# Patient Record
Sex: MALE | Race: WHITE | URBAN - METROPOLITAN AREA
[De-identification: names, ages, dates, MRNs, and addresses within clinical notes are randomized per-mention and may not be internally consistent; named-entity substitution may affect disease eponyms.]

---

## 2021-07-25 ENCOUNTER — HOSPITAL ENCOUNTER (INPATIENT)
Age: 51
LOS: 7 days | Discharge: HOME HEALTH | DRG: 720 | End: 2021-08-02
Attending: EMERGENCY MEDICINE | Admitting: INTERNAL MEDICINE
Payer: COMMERCIAL

## 2021-07-25 ENCOUNTER — APPOINTMENT (OUTPATIENT)
Dept: CT IMAGING | Age: 51
DRG: 720 | End: 2021-07-25
Payer: COMMERCIAL

## 2021-07-25 ENCOUNTER — APPOINTMENT (OUTPATIENT)
Dept: GENERAL RADIOLOGY | Age: 51
DRG: 720 | End: 2021-07-25
Payer: COMMERCIAL

## 2021-07-25 DIAGNOSIS — I87.8 CHRONIC VENOUS STASIS: ICD-10-CM

## 2021-07-25 DIAGNOSIS — B19.20 HEPATITIS C VIRUS INFECTION WITHOUT HEPATIC COMA, UNSPECIFIED CHRONICITY: ICD-10-CM

## 2021-07-25 DIAGNOSIS — J90 PLEURAL EFFUSION: Primary | ICD-10-CM

## 2021-07-25 DIAGNOSIS — K74.60 CIRRHOSIS OF LIVER WITH ASCITES, UNSPECIFIED HEPATIC CIRRHOSIS TYPE (HCC): ICD-10-CM

## 2021-07-25 DIAGNOSIS — R09.02 HYPOXIA: ICD-10-CM

## 2021-07-25 DIAGNOSIS — R18.8 CIRRHOSIS OF LIVER WITH ASCITES, UNSPECIFIED HEPATIC CIRRHOSIS TYPE (HCC): ICD-10-CM

## 2021-07-25 LAB
ALBUMIN SERPL-MCNC: 2.2 G/DL (ref 3.4–5)
ALP BLD-CCNC: 121 U/L (ref 40–129)
ALT SERPL-CCNC: 29 U/L (ref 10–40)
AMMONIA: 69 UMOL/L (ref 16–60)
ANION GAP SERPL CALCULATED.3IONS-SCNC: 12 MMOL/L (ref 3–16)
ANION GAP SERPL CALCULATED.3IONS-SCNC: 14 MMOL/L (ref 3–16)
ANISOCYTOSIS: ABNORMAL
AST SERPL-CCNC: 63 U/L (ref 15–37)
BANDED NEUTROPHILS RELATIVE PERCENT: 9 % (ref 0–7)
BASE EXCESS VENOUS: 3.1 MMOL/L (ref -2–3)
BASOPHILS ABSOLUTE: 0 K/UL (ref 0–0.2)
BASOPHILS RELATIVE PERCENT: 0 %
BILIRUB SERPL-MCNC: 5.1 MG/DL (ref 0–1)
BILIRUBIN DIRECT: 1.8 MG/DL (ref 0–0.3)
BILIRUBIN, INDIRECT: 3.3 MG/DL (ref 0–1)
BUN BLDV-MCNC: 23 MG/DL (ref 7–20)
BUN BLDV-MCNC: 24 MG/DL (ref 7–20)
CALCIUM SERPL-MCNC: 7.5 MG/DL (ref 8.3–10.6)
CALCIUM SERPL-MCNC: 7.8 MG/DL (ref 8.3–10.6)
CARBOXYHEMOGLOBIN: 2.5 % (ref 0–1.5)
CHLORIDE BLD-SCNC: 98 MMOL/L (ref 99–110)
CHLORIDE BLD-SCNC: 98 MMOL/L (ref 99–110)
CO2: 22 MMOL/L (ref 21–32)
CO2: 22 MMOL/L (ref 21–32)
CREAT SERPL-MCNC: 0.7 MG/DL (ref 0.9–1.3)
CREAT SERPL-MCNC: 0.9 MG/DL (ref 0.9–1.3)
EOSINOPHILS ABSOLUTE: 0 K/UL (ref 0–0.6)
EOSINOPHILS RELATIVE PERCENT: 0 %
GFR AFRICAN AMERICAN: >60
GFR AFRICAN AMERICAN: >60
GFR NON-AFRICAN AMERICAN: >60
GFR NON-AFRICAN AMERICAN: >60
GLUCOSE BLD-MCNC: 131 MG/DL (ref 70–99)
GLUCOSE BLD-MCNC: 50 MG/DL (ref 70–99)
HCO3 VENOUS: 27 MMOL/L (ref 24–28)
HCT VFR BLD CALC: 40.5 % (ref 40.5–52.5)
HEMOGLOBIN, VEN, REDUCED: 11.6 %
HEMOGLOBIN: 13.4 G/DL (ref 13.5–17.5)
LACTIC ACID, SEPSIS: 5.4 MMOL/L (ref 0.4–1.9)
LIPASE: 33 U/L (ref 13–60)
LYMPHOCYTES ABSOLUTE: 0 K/UL (ref 1–5.1)
LYMPHOCYTES RELATIVE PERCENT: 0 %
MCH RBC QN AUTO: 31.1 PG (ref 26–34)
MCHC RBC AUTO-ENTMCNC: 33.2 G/DL (ref 31–36)
MCV RBC AUTO: 93.6 FL (ref 80–100)
METHEMOGLOBIN VENOUS: 0 % (ref 0–1.5)
MONOCYTES ABSOLUTE: 1.4 K/UL (ref 0–1.3)
MONOCYTES RELATIVE PERCENT: 4 %
NEUTROPHILS ABSOLUTE: 34.5 K/UL (ref 1.7–7.7)
NEUTROPHILS RELATIVE PERCENT: 87 %
O2 SAT, VEN: 88 %
PCO2, VEN: 38.1 MMHG (ref 41–51)
PDW BLD-RTO: 18.5 % (ref 12.4–15.4)
PH VENOUS: 7.46 (ref 7.35–7.45)
PLATELET # BLD: 116 K/UL (ref 135–450)
PMV BLD AUTO: 8.6 FL (ref 5–10.5)
PO2, VEN: 53 MMHG (ref 25–40)
POLYCHROMASIA: ABNORMAL
POTASSIUM REFLEX MAGNESIUM: 5.3 MMOL/L (ref 3.5–5.1)
POTASSIUM REFLEX MAGNESIUM: 7 MMOL/L (ref 3.5–5.1)
PRO-BNP: 643 PG/ML (ref 0–124)
RBC # BLD: 4.33 M/UL (ref 4.2–5.9)
SODIUM BLD-SCNC: 132 MMOL/L (ref 136–145)
SODIUM BLD-SCNC: 134 MMOL/L (ref 136–145)
TCO2 CALC VENOUS: 28 MMOL/L
TOTAL PROTEIN: 7.1 G/DL (ref 6.4–8.2)
TROPONIN: 0.02 NG/ML
WBC # BLD: 35.9 K/UL (ref 4–11)

## 2021-07-25 PROCEDURE — 84484 ASSAY OF TROPONIN QUANT: CPT

## 2021-07-25 PROCEDURE — 82140 ASSAY OF AMMONIA: CPT

## 2021-07-25 PROCEDURE — 74177 CT ABD & PELVIS W/CONTRAST: CPT

## 2021-07-25 PROCEDURE — 83605 ASSAY OF LACTIC ACID: CPT

## 2021-07-25 PROCEDURE — 81001 URINALYSIS AUTO W/SCOPE: CPT

## 2021-07-25 PROCEDURE — 85025 COMPLETE CBC W/AUTO DIFF WBC: CPT

## 2021-07-25 PROCEDURE — 87186 SC STD MICRODIL/AGAR DIL: CPT

## 2021-07-25 PROCEDURE — 6360000004 HC RX CONTRAST MEDICATION: Performed by: EMERGENCY MEDICINE

## 2021-07-25 PROCEDURE — 87040 BLOOD CULTURE FOR BACTERIA: CPT

## 2021-07-25 PROCEDURE — 71260 CT THORAX DX C+: CPT

## 2021-07-25 PROCEDURE — 2580000003 HC RX 258: Performed by: EMERGENCY MEDICINE

## 2021-07-25 PROCEDURE — 80048 BASIC METABOLIC PNL TOTAL CA: CPT

## 2021-07-25 PROCEDURE — 96361 HYDRATE IV INFUSION ADD-ON: CPT

## 2021-07-25 PROCEDURE — 83690 ASSAY OF LIPASE: CPT

## 2021-07-25 PROCEDURE — 6360000002 HC RX W HCPCS: Performed by: EMERGENCY MEDICINE

## 2021-07-25 PROCEDURE — 83880 ASSAY OF NATRIURETIC PEPTIDE: CPT

## 2021-07-25 PROCEDURE — 70450 CT HEAD/BRAIN W/O DYE: CPT

## 2021-07-25 PROCEDURE — 87150 DNA/RNA AMPLIFIED PROBE: CPT

## 2021-07-25 PROCEDURE — 82803 BLOOD GASES ANY COMBINATION: CPT

## 2021-07-25 PROCEDURE — 36415 COLL VENOUS BLD VENIPUNCTURE: CPT

## 2021-07-25 PROCEDURE — 80076 HEPATIC FUNCTION PANEL: CPT

## 2021-07-25 PROCEDURE — 96375 TX/PRO/DX INJ NEW DRUG ADDON: CPT

## 2021-07-25 PROCEDURE — 71046 X-RAY EXAM CHEST 2 VIEWS: CPT

## 2021-07-25 PROCEDURE — 93005 ELECTROCARDIOGRAM TRACING: CPT | Performed by: EMERGENCY MEDICINE

## 2021-07-25 PROCEDURE — 2500000003 HC RX 250 WO HCPCS

## 2021-07-25 RX ORDER — DEXTROSE MONOHYDRATE 25 G/50ML
INJECTION, SOLUTION INTRAVENOUS
Status: COMPLETED
Start: 2021-07-25 | End: 2021-07-25

## 2021-07-25 RX ORDER — SODIUM CHLORIDE, SODIUM LACTATE, POTASSIUM CHLORIDE, AND CALCIUM CHLORIDE .6; .31; .03; .02 G/100ML; G/100ML; G/100ML; G/100ML
30 INJECTION, SOLUTION INTRAVENOUS ONCE
Status: COMPLETED | OUTPATIENT
Start: 2021-07-25 | End: 2021-07-26

## 2021-07-25 RX ORDER — ONDANSETRON 2 MG/ML
4 INJECTION INTRAMUSCULAR; INTRAVENOUS ONCE
Status: COMPLETED | OUTPATIENT
Start: 2021-07-25 | End: 2021-07-25

## 2021-07-25 RX ADMIN — SODIUM CHLORIDE, SODIUM LACTATE, POTASSIUM CHLORIDE, AND CALCIUM CHLORIDE 3060 ML: .6; .31; .03; .02 INJECTION, SOLUTION INTRAVENOUS at 22:19

## 2021-07-25 RX ADMIN — IOPAMIDOL 80 ML: 755 INJECTION, SOLUTION INTRAVENOUS at 22:31

## 2021-07-25 RX ADMIN — ONDANSETRON 4 MG: 2 INJECTION INTRAMUSCULAR; INTRAVENOUS at 23:32

## 2021-07-25 RX ADMIN — DEXTROSE MONOHYDRATE 25 G: 25 INJECTION, SOLUTION INTRAVENOUS at 22:18

## 2021-07-25 ASSESSMENT — ENCOUNTER SYMPTOMS
VOMITING: 0
COUGH: 1
SHORTNESS OF BREATH: 1
NAUSEA: 0
ABDOMINAL PAIN: 1
DIARRHEA: 0

## 2021-07-25 ASSESSMENT — PAIN DESCRIPTION - LOCATION: LOCATION: SCROTUM

## 2021-07-25 ASSESSMENT — PAIN SCALES - GENERAL: PAINLEVEL_OUTOF10: 10

## 2021-07-26 PROBLEM — K74.60 CIRRHOSIS (HCC): Status: ACTIVE | Noted: 2021-07-26

## 2021-07-26 LAB
A/G RATIO: 0.5 (ref 1.1–2.2)
ALBUMIN SERPL-MCNC: 2.1 G/DL (ref 3.4–5)
ALP BLD-CCNC: 97 U/L (ref 40–129)
ALT SERPL-CCNC: 25 U/L (ref 10–40)
AMORPHOUS: ABNORMAL /HPF
ANION GAP SERPL CALCULATED.3IONS-SCNC: 9 MMOL/L (ref 3–16)
APTT: 41.3 SEC (ref 26.2–38.6)
AST SERPL-CCNC: 51 U/L (ref 15–37)
BACTERIA: ABNORMAL /HPF
BASOPHILS ABSOLUTE: 0 K/UL (ref 0–0.2)
BASOPHILS RELATIVE PERCENT: 0 %
BILIRUB SERPL-MCNC: 4.3 MG/DL (ref 0–1)
BILIRUBIN URINE: ABNORMAL
BLOOD, URINE: ABNORMAL
BUN BLDV-MCNC: 23 MG/DL (ref 7–20)
CALCIUM SERPL-MCNC: 7.8 MG/DL (ref 8.3–10.6)
CHLORIDE BLD-SCNC: 101 MMOL/L (ref 99–110)
CLARITY: CLEAR
CO2: 26 MMOL/L (ref 21–32)
COLOR: YELLOW
CREAT SERPL-MCNC: 0.8 MG/DL (ref 0.9–1.3)
EKG ATRIAL RATE: 105 BPM
EKG DIAGNOSIS: NORMAL
EKG P AXIS: 50 DEGREES
EKG P-R INTERVAL: 138 MS
EKG Q-T INTERVAL: 328 MS
EKG QRS DURATION: 78 MS
EKG QTC CALCULATION (BAZETT): 433 MS
EKG R AXIS: 49 DEGREES
EKG T AXIS: 51 DEGREES
EKG VENTRICULAR RATE: 105 BPM
EOSINOPHILS ABSOLUTE: 0 K/UL (ref 0–0.6)
EOSINOPHILS RELATIVE PERCENT: 0.1 %
EPITHELIAL CELLS, UA: ABNORMAL /HPF (ref 0–5)
GFR AFRICAN AMERICAN: >60
GFR NON-AFRICAN AMERICAN: >60
GLOBULIN: 4 G/DL
GLUCOSE BLD-MCNC: 100 MG/DL (ref 70–99)
GLUCOSE BLD-MCNC: 88 MG/DL (ref 70–99)
GLUCOSE URINE: NEGATIVE MG/DL
HCT VFR BLD CALC: 36.1 % (ref 40.5–52.5)
HEMOGLOBIN: 12 G/DL (ref 13.5–17.5)
HIV AG/AB: NORMAL
HIV ANTIGEN: NORMAL
HIV-1 ANTIBODY: NORMAL
HIV-2 AB: NORMAL
INR BLD: 1.85 (ref 0.88–1.12)
KETONES, URINE: 15 MG/DL
LACTATE DEHYDROGENASE: 268 U/L (ref 100–190)
LACTIC ACID, SEPSIS: 6 MMOL/L (ref 0.4–1.9)
LACTIC ACID: 2.5 MMOL/L (ref 0.4–2)
LACTIC ACID: 2.8 MMOL/L (ref 0.4–2)
LEUKOCYTE ESTERASE, URINE: ABNORMAL
LYMPHOCYTES ABSOLUTE: 1.8 K/UL (ref 1–5.1)
LYMPHOCYTES RELATIVE PERCENT: 6.9 %
MCH RBC QN AUTO: 31 PG (ref 26–34)
MCHC RBC AUTO-ENTMCNC: 33.3 G/DL (ref 31–36)
MCV RBC AUTO: 93.1 FL (ref 80–100)
MICROSCOPIC EXAMINATION: YES
MONOCYTES ABSOLUTE: 0.9 K/UL (ref 0–1.3)
MONOCYTES RELATIVE PERCENT: 3.4 %
NEUTROPHILS ABSOLUTE: 23.6 K/UL (ref 1.7–7.7)
NEUTROPHILS RELATIVE PERCENT: 89.6 %
NITRITE, URINE: POSITIVE
PDW BLD-RTO: 18.3 % (ref 12.4–15.4)
PERFORMED ON: NORMAL
PH UA: 5.5 (ref 5–8)
PLATELET # BLD: 85 K/UL (ref 135–450)
PLATELET SLIDE REVIEW: ABNORMAL
PMV BLD AUTO: 8.4 FL (ref 5–10.5)
POTASSIUM REFLEX MAGNESIUM: 5.2 MMOL/L (ref 3.5–5.1)
POTASSIUM SERPL-SCNC: 5.5 MMOL/L (ref 3.5–5.1)
PROTEIN PROTEIN: 11.8 MG/DL
PROTEIN UA: 100 MG/DL
PROTHROMBIN TIME: 21.5 SEC (ref 9.9–12.7)
RBC # BLD: 3.88 M/UL (ref 4.2–5.9)
RBC UA: ABNORMAL /HPF (ref 0–4)
REPORT: NORMAL
SODIUM BLD-SCNC: 136 MMOL/L (ref 136–145)
SPECIFIC GRAVITY UA: >=1.03 (ref 1–1.03)
TOTAL PROTEIN: 6.1 G/DL (ref 6.4–8.2)
URINE REFLEX TO CULTURE: ABNORMAL
URINE TYPE: ABNORMAL
UROBILINOGEN, URINE: 2 E.U./DL
WBC # BLD: 26.4 K/UL (ref 4–11)
WBC UA: ABNORMAL /HPF (ref 0–5)

## 2021-07-26 PROCEDURE — P9047 ALBUMIN (HUMAN), 25%, 50ML: HCPCS | Performed by: INTERNAL MEDICINE

## 2021-07-26 PROCEDURE — 85025 COMPLETE CBC W/AUTO DIFF WBC: CPT

## 2021-07-26 PROCEDURE — 36415 COLL VENOUS BLD VENIPUNCTURE: CPT

## 2021-07-26 PROCEDURE — 87340 HEPATITIS B SURFACE AG IA: CPT

## 2021-07-26 PROCEDURE — 83615 LACTATE (LD) (LDH) ENZYME: CPT

## 2021-07-26 PROCEDURE — 6360000002 HC RX W HCPCS: Performed by: STUDENT IN AN ORGANIZED HEALTH CARE EDUCATION/TRAINING PROGRAM

## 2021-07-26 PROCEDURE — 6360000002 HC RX W HCPCS: Performed by: INTERNAL MEDICINE

## 2021-07-26 PROCEDURE — 86702 HIV-2 ANTIBODY: CPT

## 2021-07-26 PROCEDURE — 84156 ASSAY OF PROTEIN URINE: CPT

## 2021-07-26 PROCEDURE — 2580000003 HC RX 258: Performed by: STUDENT IN AN ORGANIZED HEALTH CARE EDUCATION/TRAINING PROGRAM

## 2021-07-26 PROCEDURE — 99284 EMERGENCY DEPT VISIT MOD MDM: CPT

## 2021-07-26 PROCEDURE — 6370000000 HC RX 637 (ALT 250 FOR IP): Performed by: STUDENT IN AN ORGANIZED HEALTH CARE EDUCATION/TRAINING PROGRAM

## 2021-07-26 PROCEDURE — 86704 HEP B CORE ANTIBODY TOTAL: CPT

## 2021-07-26 PROCEDURE — 87390 HIV-1 AG IA: CPT

## 2021-07-26 PROCEDURE — 86803 HEPATITIS C AB TEST: CPT

## 2021-07-26 PROCEDURE — 86701 HIV-1ANTIBODY: CPT

## 2021-07-26 PROCEDURE — 1200000000 HC SEMI PRIVATE

## 2021-07-26 PROCEDURE — 82570 ASSAY OF URINE CREATININE: CPT

## 2021-07-26 PROCEDURE — 6360000002 HC RX W HCPCS: Performed by: EMERGENCY MEDICINE

## 2021-07-26 PROCEDURE — 87522 HEPATITIS C REVRS TRNSCRPJ: CPT

## 2021-07-26 PROCEDURE — 94761 N-INVAS EAR/PLS OXIMETRY MLT: CPT

## 2021-07-26 PROCEDURE — 85610 PROTHROMBIN TIME: CPT

## 2021-07-26 PROCEDURE — 96365 THER/PROPH/DIAG IV INF INIT: CPT

## 2021-07-26 PROCEDURE — 85730 THROMBOPLASTIN TIME PARTIAL: CPT

## 2021-07-26 PROCEDURE — 84132 ASSAY OF SERUM POTASSIUM: CPT

## 2021-07-26 PROCEDURE — 2700000000 HC OXYGEN THERAPY PER DAY

## 2021-07-26 PROCEDURE — 87341 HEP B SURFACE AG NEUTRLZJ IA: CPT

## 2021-07-26 PROCEDURE — 86706 HEP B SURFACE ANTIBODY: CPT

## 2021-07-26 PROCEDURE — 83605 ASSAY OF LACTIC ACID: CPT

## 2021-07-26 PROCEDURE — 2580000003 HC RX 258: Performed by: EMERGENCY MEDICINE

## 2021-07-26 PROCEDURE — 80053 COMPREHEN METABOLIC PANEL: CPT

## 2021-07-26 PROCEDURE — 99223 1ST HOSP IP/OBS HIGH 75: CPT | Performed by: INTERNAL MEDICINE

## 2021-07-26 RX ORDER — ONDANSETRON 4 MG/1
4 TABLET, ORALLY DISINTEGRATING ORAL EVERY 8 HOURS PRN
Status: DISCONTINUED | OUTPATIENT
Start: 2021-07-26 | End: 2021-08-02 | Stop reason: HOSPADM

## 2021-07-26 RX ORDER — ALBUMIN (HUMAN) 12.5 G/50ML
25 SOLUTION INTRAVENOUS EVERY 8 HOURS
Status: COMPLETED | OUTPATIENT
Start: 2021-07-26 | End: 2021-07-27

## 2021-07-26 RX ORDER — TRAMADOL HYDROCHLORIDE 50 MG/1
50 TABLET ORAL PRN
Status: DISPENSED | OUTPATIENT
Start: 2021-07-26 | End: 2021-07-29

## 2021-07-26 RX ORDER — FUROSEMIDE 40 MG/1
40 TABLET ORAL DAILY
Status: DISCONTINUED | OUTPATIENT
Start: 2021-07-26 | End: 2021-07-26

## 2021-07-26 RX ORDER — FUROSEMIDE 10 MG/ML
40 INJECTION INTRAMUSCULAR; INTRAVENOUS 2 TIMES DAILY
Status: DISCONTINUED | OUTPATIENT
Start: 2021-07-26 | End: 2021-07-27

## 2021-07-26 RX ORDER — SODIUM CHLORIDE 0.9 % (FLUSH) 0.9 %
5-40 SYRINGE (ML) INJECTION EVERY 12 HOURS SCHEDULED
Status: DISCONTINUED | OUTPATIENT
Start: 2021-07-26 | End: 2021-08-02 | Stop reason: HOSPADM

## 2021-07-26 RX ORDER — SODIUM CHLORIDE 9 MG/ML
25 INJECTION, SOLUTION INTRAVENOUS PRN
Status: DISCONTINUED | OUTPATIENT
Start: 2021-07-26 | End: 2021-08-02 | Stop reason: HOSPADM

## 2021-07-26 RX ORDER — SODIUM CHLORIDE 0.9 % (FLUSH) 0.9 %
5-40 SYRINGE (ML) INJECTION PRN
Status: DISCONTINUED | OUTPATIENT
Start: 2021-07-26 | End: 2021-08-02 | Stop reason: HOSPADM

## 2021-07-26 RX ORDER — SPIRONOLACTONE 100 MG/1
100 TABLET, FILM COATED ORAL DAILY
Status: DISCONTINUED | OUTPATIENT
Start: 2021-07-26 | End: 2021-08-02 | Stop reason: HOSPADM

## 2021-07-26 RX ORDER — SODIUM CHLORIDE, SODIUM LACTATE, POTASSIUM CHLORIDE, CALCIUM CHLORIDE 600; 310; 30; 20 MG/100ML; MG/100ML; MG/100ML; MG/100ML
INJECTION, SOLUTION INTRAVENOUS CONTINUOUS
Status: DISCONTINUED | OUTPATIENT
Start: 2021-07-26 | End: 2021-07-26

## 2021-07-26 RX ORDER — ONDANSETRON 2 MG/ML
4 INJECTION INTRAMUSCULAR; INTRAVENOUS EVERY 6 HOURS PRN
Status: DISCONTINUED | OUTPATIENT
Start: 2021-07-26 | End: 2021-08-02 | Stop reason: HOSPADM

## 2021-07-26 RX ORDER — LACTULOSE 10 G/15ML
20 SOLUTION ORAL 3 TIMES DAILY
Status: DISCONTINUED | OUTPATIENT
Start: 2021-07-26 | End: 2021-07-27

## 2021-07-26 RX ORDER — CLONIDINE HYDROCHLORIDE 0.1 MG/1
0.1 TABLET ORAL PRN
Status: DISCONTINUED | OUTPATIENT
Start: 2021-07-26 | End: 2021-08-02 | Stop reason: HOSPADM

## 2021-07-26 RX ADMIN — TRAMADOL HYDROCHLORIDE 50 MG: 50 TABLET, FILM COATED ORAL at 12:23

## 2021-07-26 RX ADMIN — RIFAXIMIN 200 MG: 200 TABLET ORAL at 21:27

## 2021-07-26 RX ADMIN — ALBUMIN (HUMAN) 25 G: 0.25 INJECTION, SOLUTION INTRAVENOUS at 20:55

## 2021-07-26 RX ADMIN — ALBUMIN (HUMAN) 25 G: 0.25 INJECTION, SOLUTION INTRAVENOUS at 13:49

## 2021-07-26 RX ADMIN — LACTULOSE: 10 SOLUTION ORAL at 11:21

## 2021-07-26 RX ADMIN — CLONIDINE HYDROCHLORIDE 0.1 MG: 0.1 TABLET ORAL at 12:23

## 2021-07-26 RX ADMIN — FUROSEMIDE 40 MG: 10 INJECTION, SOLUTION INTRAMUSCULAR; INTRAVENOUS at 17:56

## 2021-07-26 RX ADMIN — PHYTONADIONE 10 MG: 10 INJECTION, EMULSION INTRAMUSCULAR; INTRAVENOUS; SUBCUTANEOUS at 11:21

## 2021-07-26 RX ADMIN — CEFTRIAXONE 1000 MG: 1 INJECTION, POWDER, FOR SOLUTION INTRAMUSCULAR; INTRAVENOUS at 02:04

## 2021-07-26 RX ADMIN — SODIUM CHLORIDE, POTASSIUM CHLORIDE, SODIUM LACTATE AND CALCIUM CHLORIDE: 600; 310; 30; 20 INJECTION, SOLUTION INTRAVENOUS at 07:04

## 2021-07-26 RX ADMIN — LACTULOSE 20 G: 20 SOLUTION ORAL at 15:06

## 2021-07-26 RX ADMIN — ONDANSETRON 4 MG: 2 INJECTION INTRAMUSCULAR; INTRAVENOUS at 07:57

## 2021-07-26 RX ADMIN — Medication 10 ML: at 21:27

## 2021-07-26 RX ADMIN — LACTULOSE 20 G: 20 SOLUTION ORAL at 20:55

## 2021-07-26 RX ADMIN — Medication 10 ML: at 20:55

## 2021-07-26 RX ADMIN — FUROSEMIDE 40 MG: 10 INJECTION, SOLUTION INTRAMUSCULAR; INTRAVENOUS at 11:24

## 2021-07-26 RX ADMIN — AZITHROMYCIN MONOHYDRATE 500 MG: 500 INJECTION, POWDER, LYOPHILIZED, FOR SOLUTION INTRAVENOUS at 07:02

## 2021-07-26 RX ADMIN — RIFAXIMIN 200 MG: 200 TABLET ORAL at 15:17

## 2021-07-26 ASSESSMENT — ENCOUNTER SYMPTOMS
BLOOD IN STOOL: 0
EYES NEGATIVE: 1
RHINORRHEA: 0
CONSTIPATION: 0
SHORTNESS OF BREATH: 1
ABDOMINAL DISTENTION: 1
COUGH: 1
SORE THROAT: 0
NAUSEA: 1
EYE REDNESS: 1
ALLERGIC/IMMUNOLOGIC NEGATIVE: 1
ABDOMINAL PAIN: 1
APNEA: 0
DIARRHEA: 0
VOMITING: 1
BACK PAIN: 1
CHEST TIGHTNESS: 1
VOMITING: 0
CHEST TIGHTNESS: 0

## 2021-07-26 ASSESSMENT — PAIN DESCRIPTION - LOCATION
LOCATION: ABDOMEN
LOCATION: FLANK

## 2021-07-26 ASSESSMENT — PAIN SCALES - GENERAL
PAINLEVEL_OUTOF10: 10
PAINLEVEL_OUTOF10: 7
PAINLEVEL_OUTOF10: 0
PAINLEVEL_OUTOF10: 7

## 2021-07-26 ASSESSMENT — PAIN DESCRIPTION - PAIN TYPE: TYPE: ACUTE PAIN

## 2021-07-26 NOTE — PROGRESS NOTES
Department of Pharmacy    Notification received from laboratory of positive blood culture results. Organism(s) detected: Streptococcus pneumoniae  Applicable Antimicrobial Resistance Genes: none detected at the moment    Adequately covered by : ceftriaxone    Recommendations reviewed with Dr. Stephanie Hernandez.

## 2021-07-26 NOTE — H&P
Internal Medicine  PGY 1  History & Physical      CC abdominal pain    History Obtained From:  patient    HISTORY OF PRESENT ILLNESS:  Debra Clark is a 46 y/o w/ a PMH of hepatitis C, IV drug use, and no previous medical care who presents with adominal pain, emesis, and shortness of breath. Patient reports that about a year ago he started to notice bloating in his abdomen. He has had a couple month history of lower right quadrant abdominal pain. He says that the pain is a 5/10 dull, achy pain that does not radiate. He says that the pain tends to come and go and nothing makes it better or worse. He reports a couple month hx of SOB. The patient has a 35 pack year smoking history. 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 He has noticed increased shortness of breath while working and walking around. Usually he is able to walk long distances without being SOB. He denies chest pain associated w/ his SOB. He reports a worsening cough over the past weak w/ green sputum production. He denies fevers, or chills associated with his abdominal pain, cough, and SOB. Today patient reports having 10 episodes of non-bloody, watery emesis which is what caused him to come in. Additionally, the patient also reports intermittent \"scrotal swelling\" for three months. He says that his right testicle will swell and increase in size. It is non-painful and nothing makes it better or worse. He reports that the swelling will go away after a couple hours or days. He has also had intermittent hematuria that has been going on for approximately three months. He reports that the hematuria is transient, dark red urine. Nothing makes it better or worse. Lastly, over the past three months patient has had a progressive, bilateral lower extremity rash. The rash is non-painful, plaque like, erythematous, raised, and completely covers his lower extremities.     Past Medical History:    No previous past medical hx    Past Surgical History:    No previous past surgical hx    Medications Priorto Admission:    · No medications prior to admission. Allergies:  Patient has no known allergies. Social History:   · TOBACCO:   reports that he has been smoking cigarettes. He has been smoking about 1.00 pack per day. He has never used smokeless tobacco.  · ETOH:   reports no history of alcohol use. · DRUGS : Patient reports 10-15 years of IV drug abuse, mainly opiates  · Patient currently lives in [place] with roommates. Works odd jobs such as carpentry. ·   Family History:   · History reviewed. No pertinent family history. Review of Systems   Constitutional: Negative for activity change, appetite change, chills, fatigue and fever. HENT: Negative for congestion, rhinorrhea and sore throat. Eyes: Positive for redness. Respiratory: Positive for cough and shortness of breath. Negative for apnea and chest tightness. Cardiovascular: Negative for chest pain, palpitations and leg swelling. Gastrointestinal: Positive for abdominal distention, nausea and vomiting. Negative for blood in stool, constipation and diarrhea. Genitourinary: Positive for hematuria and scrotal swelling. Negative for dysuria, flank pain and testicular pain. Skin: Positive for rash and wound. Neurological: Positive for tremors and weakness. Negative for syncope and headaches. Psychiatric/Behavioral: Negative for agitation and confusion. ROS: A 10 point review of systems was conducted, significant findings as noted in HPI. Physical Exam  Constitutional:       General: He is in acute distress. Appearance: He is ill-appearing. HENT:      Head: Normocephalic and atraumatic. Nose: No congestion or rhinorrhea. Mouth/Throat:      Mouth: Mucous membranes are moist.   Eyes:      General: Scleral icterus present. Cardiovascular:      Rate and Rhythm: Regular rhythm. Tachycardia present. Pulses: Normal pulses. Heart sounds: Normal heart sounds. Pulmonary:      Breath sounds: Rales present. Abdominal:      General: There is distension. Palpations: There is no mass. Tenderness: There is abdominal tenderness. There is no rebound. Comments: Focal tenderness to RLQ   Musculoskeletal:         General: No swelling or deformity. Skin:     Findings: Lesion and rash present. Comments: Track marks on arms bilaterally. Bilateral lower extremities show erythema, raised nodules, lichenification that encircle entire lower leg   Neurological:      General: No focal deficit present. Mental Status: He is alert and oriented to person, place, and time. Psychiatric:         Mood and Affect: Mood normal.         Behavior: Behavior normal.         Thought Content: Thought content normal.       Physical exam:       Vitals:    07/26/21 0332   BP: 111/75   Pulse: 91   Resp: 18   Temp: 98.8 °F (37.1 °C)   SpO2: 92%       DATA:    Labs:  CBC:   Recent Labs     07/25/21 2055   WBC 35.9*   HGB 13.4*   HCT 40.5   *       BMP:   Recent Labs     07/25/21 2055 07/25/21 2204 07/26/21  0030   * 132*  --    K 5.3* 7.0* 5.5*   CL 98* 98*  --    CO2 22 22  --    BUN 23* 24*  --    CREATININE 0.9 0.7*  --    GLUCOSE 50* 131*  --      LFT's:   Recent Labs     07/25/21 2055   AST 63*   ALT 29   BILITOT 5.1*   ALKPHOS 121     Troponin:   Recent Labs     07/25/21 2055   TROPONINI 0.02*     BNP:No results for input(s): BNP in the last 72 hours. ABGs: No results for input(s): PHART, CCR1GAR, PO2ART in the last 72 hours. INR: No results for input(s): INR in the last 72 hours. U/A:  Recent Labs     07/25/21 2055   COLORU Yellow   PHUR 5.5   WBCUA 3-5   RBCUA 21-50*   BACTERIA 1+*   CLARITYU Clear   SPECGRAV >=1.030   LEUKOCYTESUR TRACE*   UROBILINOGEN 2.0*   BILIRUBINUR MODERATE*   BLOODU LARGE*   GLUCOSEU Negative   AMORPHOUS 3+       CT ABDOMEN PELVIS W IV CONTRAST Additional Contrast? None   Final Result      CHEST      1.   Large multiloculated right pleural effusion. Recommend diagnostic and therapeutic thoracentesis. 2.  Complete atelectasis of the right upper lobe. Right upper lobe airway secretions. 3.  Compressive atelectasis in the right lower lobe and middle lobe. 4.  Ill-defined nodular opacities in left upper lobe likely infectious or inflammatory but nonspecific.   5.  Mild mediastinal and bilateral supraclavicular lymphadenopathy. ABDOMEN AND PELVIS      1. Cirrhotic liver with stigmata of portal hypertension including splenomegaly, mild ascites and esophageal and gastric varices. 2.  Jessica hepatis, celiac axis and retroperitoneal lymphadenopathy. 3.  Diffusely thickened bowel and stomach may represent sequela of portal hypertension versus infectious/inflammatory. 4.  Anasarca. 5.  Right inguinal hernia containing small bowel without evidence of complication. CT CHEST W CONTRAST   Final Result      CHEST      1. Large multiloculated right pleural effusion. Recommend diagnostic and therapeutic thoracentesis. 2.  Complete atelectasis of the right upper lobe. Right upper lobe airway secretions. 3.  Compressive atelectasis in the right lower lobe and middle lobe. 4.  Ill-defined nodular opacities in left upper lobe likely infectious or inflammatory but nonspecific.   5.  Mild mediastinal and bilateral supraclavicular lymphadenopathy. ABDOMEN AND PELVIS      1. Cirrhotic liver with stigmata of portal hypertension including splenomegaly, mild ascites and esophageal and gastric varices. 2.  Jessica hepatis, celiac axis and retroperitoneal lymphadenopathy. 3.  Diffusely thickened bowel and stomach may represent sequela of portal hypertension versus infectious/inflammatory. 4.  Anasarca. 5.  Right inguinal hernia containing small bowel without evidence of complication. CT HEAD WO CONTRAST   Final Result      1. No acute intracranial hemorrhage or mass effect. 2.  Sinus disease.       XR CHEST (2 VW)   Final Result      Extensive right pleural-parenchymal opacities, which can be seen with pneumonia or malignancy. CT chest with contrast.              ASSESSMENT AND PLAN:    #Decompensate cirrhosis w/ ascites 2/2 suspected chronic hepatitis C  -On physical exam patient exhibits scleral icterus, ascites, asterixis, and visible jaundiced  -CT abdomen and pelvis shows: \"Cirrhotic liver w/ stigmata of portal hypertension including splenomegaly, mild ascites, and esophageal and gastric varices. \"  -patient reports 10-15 year hx of IV drug use. Patient reports getting diagnosed w/ Hep C, but does not remember when or by who. I could find no record of positive tests in the EMR. -Consult IR for diagnostic paracentesis and thoracentesis   -Consider GI consult for new onset cirrhosis, esophageal varices on imaging, no acute signs of hemorrhage currently  -INR/PT, Hepatitis panel, HIV antibodies if negative can consider testing for non-infectious causes (hemachronmatosis, Iam's disease, alpha-antitrypsin deficiency, PBC, PSC)  -Tx: diuresis; 100 mg spironolactone and 40 mg IV lasix. Currently holding spironolactone due to hyperkalemia. Restart once potassium normalizes.   -Tx w/ empiric ceftriaxone for SBP ppx   -Tx w/ lactulose/rifaxim for ammonia of 67, asterixis on exam. 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     #Substance use disorder  -Patient on COWS protocol for reported daily fentanyl use  -Consulted social work for assistance w/cessation  -Results pending for bloodborne pathogens; HIV, HepB, HepC      Will discuss with attending physician Dr. Spangler Finely Status:Full code  FEN:   PPX: Lovenox  DISPO: Kael Jameson MD  7/26/2021,  3:50 AM

## 2021-07-26 NOTE — PROGRESS NOTES
Progress Note    Admit Date: 7/25/2021  Day: 0  Diet: Diet NPO    CC: abdominal pain, SOB and cough    Interval history: NAONE. Patient was seen and examined this am. He appeared tired. He was reporting dull, constant, abdominal pain, cough productive of green sputum and LE \"rash\". He denies any SOB, N/V, CP, palpitations or c/d. Medications:     Scheduled Meds:   sodium chloride flush  5-40 mL Intravenous 2 times per day    [START ON 7/27/2021] cefTRIAXone (ROCEPHIN) IV  1,000 mg Intravenous Q24H    [Held by provider] spironolactone  100 mg Oral Daily    furosemide  40 mg Oral Daily    rifaximin  200 mg Oral TID    lactulose  20 g Oral TID    [START ON 7/27/2021] azithromycin  250 mg Intravenous Q24H     Continuous Infusions:   sodium chloride      lactated ringers 50 mL/hr at 07/26/21 0704     PRN Meds:sodium chloride flush, sodium chloride, ondansetron **OR** ondansetron    Objective:   Vitals:   T-max:  Patient Vitals for the past 8 hrs:   BP Temp Temp src Pulse Resp SpO2 Weight   07/26/21 0739 122/81 98.2 °F (36.8 °C) Oral 96 16 92 %    07/26/21 0444       222 lb 7.1 oz (100.9 kg)   07/26/21 0332 111/75 98.8 °F (37.1 °C) Oral 91 18 92 %    07/26/21 0230 101/76   90 16 96 %    07/26/21 0200 100/76   92 9 96 %    07/26/21 0100 121/78   90 13 93 %        Intake/Output Summary (Last 24 hours) at 7/26/2021 0831  Last data filed at 7/26/2021 7900  Gross per 24 hour   Intake 3110 ml   Output 200 ml   Net 2910 ml       Review of Systems - as above    Physical Exam  Constitutional:       Appearance: He is ill-appearing. HENT:      Head: Normocephalic and atraumatic. Eyes:      General: Scleral icterus present. Cardiovascular:      Rate and Rhythm: Normal rate and regular rhythm. Pulses: Normal pulses. Heart sounds: No murmur heard. No gallop. Pulmonary:      Breath sounds: No wheezing or rales.       Comments: Adventitious sounds present on inspiration  Abdominal: General: Bowel sounds are normal. There is distension. Tenderness: There is abdominal tenderness (RUQ and lower quadrants). There is no guarding. Comments: Mild fluid wave present   Musculoskeletal:         General: Deformity (leg skin changes) present. Cervical back: Neck supple. No tenderness. Right lower leg: No edema. Left lower leg: No edema. Comments: asterixis   Skin:     Capillary Refill: Capillary refill takes less than 2 seconds. Coloration: Skin is jaundiced. Comments: Hypertrophic, non-tender, hyperpigmented lesions present throughout both LE up to knees   Neurological:      General: No focal deficit present. Mental Status: He is oriented to person, place, and time. Cranial Nerves: No cranial nerve deficit. Sensory: No sensory deficit. Motor: No weakness. LABS:    CBC:   Recent Labs     07/25/21 2055   WBC 35.9*   HGB 13.4*   HCT 40.5   *   MCV 93.6     Renal:    Recent Labs     07/25/21 2055 07/25/21 2055 07/25/21  2204 07/26/21  0030 07/26/21  0539   *  --  132*  --  136   K 5.3*   < > 7.0* 5.5* 5.2*   CL 98*  --  98*  --  101   CO2 22  --  22  --  26   BUN 23*  --  24*  --  23*   CREATININE 0.9  --  0.7*  --  0.8*   GLUCOSE 50*  --  131*  --  100*   CALCIUM 7.8*  --  7.5*  --  7.8*   ANIONGAP 14  --  12  --  9    < > = values in this interval not displayed. Hepatic:   Recent Labs     07/25/21 2055 07/26/21  0539   AST 63* 51*   ALT 29 25   BILITOT 5.1* 4.3*   BILIDIR 1.8*  --    PROT 7.1 6.1*   LABALBU 2.2* 2.1*   ALKPHOS 121 97     Troponin:   Recent Labs     07/25/21 2055   TROPONINI 0.02*     BNP: No results for input(s): BNP in the last 72 hours. Lipids: No results for input(s): CHOL, HDL in the last 72 hours. Invalid input(s): LDLCALCU, TRIGLYCERIDE  ABGs:  No results for input(s): PHART, RQH5HWP, PO2ART, XJT8ANX, BEART, THGBART, S3OOJQEX, QZF9LBK in the last 72 hours.     INR:   Recent Labs 07/26/21  0539   INR 1.85*     Lactate: No results for input(s): LACTATE in the last 72 hours. Cultures:  -----------------------------------------------------------------  RAD:   CT ABDOMEN PELVIS W IV CONTRAST Additional Contrast? None   Final Result      CHEST      1. Large multiloculated right pleural effusion. Recommend diagnostic and therapeutic thoracentesis. 2.  Complete atelectasis of the right upper lobe. Right upper lobe airway secretions. 3.  Compressive atelectasis in the right lower lobe and middle lobe. 4.  Ill-defined nodular opacities in left upper lobe likely infectious or inflammatory but nonspecific.   5.  Mild mediastinal and bilateral supraclavicular lymphadenopathy. ABDOMEN AND PELVIS      1. Cirrhotic liver with stigmata of portal hypertension including splenomegaly, mild ascites and esophageal and gastric varices. 2.  Jessica hepatis, celiac axis and retroperitoneal lymphadenopathy. 3.  Diffusely thickened bowel and stomach may represent sequela of portal hypertension versus infectious/inflammatory. 4.  Anasarca. 5.  Right inguinal hernia containing small bowel without evidence of complication. CT CHEST W CONTRAST   Final Result      CHEST      1. Large multiloculated right pleural effusion. Recommend diagnostic and therapeutic thoracentesis. 2.  Complete atelectasis of the right upper lobe. Right upper lobe airway secretions. 3.  Compressive atelectasis in the right lower lobe and middle lobe. 4.  Ill-defined nodular opacities in left upper lobe likely infectious or inflammatory but nonspecific.   5.  Mild mediastinal and bilateral supraclavicular lymphadenopathy. ABDOMEN AND PELVIS      1. Cirrhotic liver with stigmata of portal hypertension including splenomegaly, mild ascites and esophageal and gastric varices. 2.  Jessica hepatis, celiac axis and retroperitoneal lymphadenopathy.    3.  Diffusely thickened bowel and stomach may represent sequela of portal hypertension versus infectious/inflammatory. 4.  Anasarca. 5.  Right inguinal hernia containing small bowel without evidence of complication. CT HEAD WO CONTRAST   Final Result      1. No acute intracranial hemorrhage or mass effect. 2.  Sinus disease. XR CHEST (2 VW)   Final Result      Extensive right pleural-parenchymal opacities, which can be seen with pneumonia or malignancy. CT chest with contrast.                Assessment/Plan:     John Argueta is a 44 y/o w/ a PMH of hepatitis C, IVDU, and no previous medical care who p/w adominal pain, emesis, and shortness of breath. He was diagnosed as having decompensated liver cirrhosis and sepsis. Hepatic encephalopathy 2/2 decompensated cirrhosis   Patient with 10-15 yr hx of IVDU as well as remote, self reported hx of Hep C who presents with stigmata of liver disease: scleral icterus, jaundice, ascites and asterixis. Elevated INR and bili, low albumin and platelets. CT abdomen and pelvis showed cirrhotic liver w/ stigmata of portal hypertension including splenomegaly, mild ascites, and esophageal and gastric varices.   -Consult IR for diagnostic paracentesis and thoracentesis   - GI consulted for new onset cirrhosis  - Hepatitis panel, HIV antibodies pending  - Holding spironolactone d/t hyperkalemia  - Continue lasix  -Continue ceftriaxone for SBP ppx   -Continue lactulose enema  - Continue rifaximin  - paracentesis     Sepsis secondary to suspected pulmonary source  Patient reporting increased SOB over a couple months. Over the last week patient has had new cough w/ green sputum production. CT chest showed  large multiloculated right pleural effusion. Complete atelectasis of the right upper lobe. Compressive atelectasis in the right lower lobe and middle lobe. Ill-defined nodular opacities in left upper lobe likely infectious or inflammatory but nonspecific. Mild mediastinal and bilateral supraclavicular lymphadenopathy.  WBC elevated at 35.9.  - Continue azithromycin  - thoracentesis     Hematuria   Patient with 35 pack year smoking hx reporting intermittently bloody urine over the past few months. UA shows 21-50 RBC per hpf. - Possibly consult urology      Bilateral lower extremity rash unclear etiology  Patient reporting three months of bilateral lower extremity rash.  Suspicious for sequelae of hepatitis C.  - Hepatitis panel pending  - HIV pending     IV drug use disorder  -Patient on COWS protocol for reported daily fentanyl use  -Consulted social work for assistance w/cessation  -Results pending for bloodborne pathogens; HIV, HepB, HepC    Code Status: Full Code  FEN: Diet NPO  PPX: SCDs  DISPO: IP    Vandana Smith MD, PGY-1  07/26/21  8:31 AM    This patient has been staffed and discussed with Jasbir Hartmann MD.

## 2021-07-26 NOTE — PROGRESS NOTES
Pt admitted to room 6324. Drowsy, but arousable. 5 liters NC, resp easy. Lungs diminished, scattered wheezes, rales. Vital signs stable, afebrile. Call light in reach, bed alarm in place.

## 2021-07-26 NOTE — PROGRESS NOTES
Progress Note    Admit Date: 7/25/2021  Day: 1  Diet: Diet NPO    Interval history: No acute overnight events. Patient seen and examined in AM. AOx3, in pain, coughing up yellow/green sputum, laying in bed. Patient complains the he \"feels awful\", and says his abdominal pain is 7/10. He says he had intense nausea this AM and was given zofran. He has paracentesis and thoracentesis scheduled for this afternoon with IR. Afebrile and HDS. BL IPC for DVT ppx. Medications:     Scheduled Meds:   sodium chloride flush  5-40 mL Intravenous 2 times per day    [START ON 7/27/2021] cefTRIAXone (ROCEPHIN) IV  1,000 mg Intravenous Q24H    [Held by provider] spironolactone  100 mg Oral Daily    furosemide  40 mg Oral Daily    rifaximin  200 mg Oral TID    lactulose  20 g Oral TID    [START ON 7/27/2021] azithromycin  250 mg Intravenous Q24H     Continuous Infusions:   sodium chloride      lactated ringers 50 mL/hr at 07/26/21 0704     PRN Meds:sodium chloride flush, sodium chloride, ondansetron **OR** ondansetron    Objective:   Vitals:   T-max:  Patient Vitals for the past 8 hrs:   BP Temp Temp src Pulse Resp SpO2 Weight   07/26/21 0739 122/81 98.2 °F (36.8 °C) Oral 96 16 92 %    07/26/21 0444       222 lb 7.1 oz (100.9 kg)   07/26/21 0332 111/75 98.8 °F (37.1 °C) Oral 91 18 92 %    07/26/21 0230 101/76   90 16 96 %    07/26/21 0200 100/76   92 9 96 %    07/26/21 0100 121/78   90 13 93 %        Intake/Output Summary (Last 24 hours) at 7/26/2021 0846  Last data filed at 7/26/2021 0612  Gross per 24 hour   Intake 3110 ml   Output 200 ml   Net 2910 ml     Review of Systems   Constitutional: Negative. HENT: Negative. Eyes: Negative. Respiratory: Positive for cough, chest tightness and shortness of breath. Cardiovascular: Negative. Gastrointestinal: Positive for abdominal distention, abdominal pain and nausea. Negative for constipation, diarrhea and vomiting.    Endocrine: Negative for polyuria. Genitourinary: Positive for difficulty urinating. Musculoskeletal: Positive for back pain. Skin: Negative. Allergic/Immunologic: Negative. Neurological: Positive for tremors. Negative for dizziness, syncope, light-headedness, numbness and headaches. Hematological: Negative. Psychiatric/Behavioral: Negative. Physical Exam  Constitutional:       General: He is in acute distress. Appearance: He is ill-appearing. Comments: Abdominal pain 7/10   HENT:      Head: Normocephalic and atraumatic. Mouth/Throat:      Mouth: Mucous membranes are dry. Eyes:      General: Scleral icterus present. Extraocular Movements: Extraocular movements intact. Cardiovascular:      Rate and Rhythm: Normal rate. Rhythm irregular. Heart sounds: Murmur (Holosystolic in 2nd left and right intercostal space) heard. No friction rub. No gallop. Comments: JVD seen up to the mandible  Lower extremities cool to the touch, could be 2/2 BL IPC  Holosystolic heart murmur heard  Pulmonary:      Effort: Respiratory distress present. Breath sounds: Wheezing present. Comments: Wheezes and crackles heard with inhalation  Wheezes heard on exhalation  Increased work of breathing  SpO2 94% on 5L NC  Chest:      Chest wall: No tenderness. Abdominal:      General: There is distension. Palpations: Abdomen is soft. There is no mass. Tenderness: There is abdominal tenderness. Hernia: No hernia is present. Comments: Patient expressed severe diffuse pain on light palpation  Fluid shift noted when patient moved from his back to his side. Fluid wave/fluid shift examination hindered by pain   Musculoskeletal:         General: Deformity (Right shoulder hump noted) present. Cervical back: Normal range of motion. Skin:     General: Skin is warm and dry. Coloration: Skin is jaundiced (mild). Findings: Rash (BL lower extremities to the knee.  Pink, raised, nodular, scaly rash.) present. Neurological:      Mental Status: He is oriented to person, place, and time. Comments: Patient AOx3, in pain, reluctant to answer questions   Psychiatric:         Mood and Affect: Mood normal.           LABS:    CBC:   Recent Labs     07/25/21 2055   WBC 35.9*   HGB 13.4*   HCT 40.5   *   MCV 93.6     Renal:    Recent Labs     07/25/21 2055 07/25/21 2055 07/25/21 2204 07/26/21 0030 07/26/21  0539   *  --  132*  --  136   K 5.3*   < > 7.0* 5.5* 5.2*   CL 98*  --  98*  --  101   CO2 22  --  22  --  26   BUN 23*  --  24*  --  23*   CREATININE 0.9  --  0.7*  --  0.8*   GLUCOSE 50*  --  131*  --  100*   CALCIUM 7.8*  --  7.5*  --  7.8*   ANIONGAP 14  --  12  --  9    < > = values in this interval not displayed. Hepatic:   Recent Labs     07/25/21 2055 07/26/21 0539   AST 63* 51*   ALT 29 25   BILITOT 5.1* 4.3*   BILIDIR 1.8*  --    PROT 7.1 6.1*   LABALBU 2.2* 2.1*   ALKPHOS 121 97     Troponin:   Recent Labs     07/25/21 2055   TROPONINI 0.02*     BNP: No results for input(s): BNP in the last 72 hours. Lipids: No results for input(s): CHOL, HDL in the last 72 hours. Invalid input(s): LDLCALCU, TRIGLYCERIDE  ABGs:  No results for input(s): PHART, BKF0COV, PO2ART, CDG1ADI, BEART, THGBART, Z5YABZVA, RGG3WRI in the last 72 hours. INR:   Recent Labs     07/26/21 0539   INR 1.85*     Lactate: No results for input(s): LACTATE in the last 72 hours. Cultures:  -----------------------------------------------------------------  RAD:   CT ABDOMEN PELVIS W IV CONTRAST Additional Contrast? None   Final Result      CHEST      1. Large multiloculated right pleural effusion. Recommend diagnostic and therapeutic thoracentesis. 2.  Complete atelectasis of the right upper lobe. Right upper lobe airway secretions. 3.  Compressive atelectasis in the right lower lobe and middle lobe.    4.  Ill-defined nodular opacities in left upper lobe likely infectious or inflammatory but nonspecific.   5.  Mild mediastinal and bilateral supraclavicular lymphadenopathy. ABDOMEN AND PELVIS      1. Cirrhotic liver with stigmata of portal hypertension including splenomegaly, mild ascites and esophageal and gastric varices. 2.  Jessica hepatis, celiac axis and retroperitoneal lymphadenopathy. 3.  Diffusely thickened bowel and stomach may represent sequela of portal hypertension versus infectious/inflammatory. 4.  Anasarca. 5.  Right inguinal hernia containing small bowel without evidence of complication. CT CHEST W CONTRAST   Final Result      CHEST      1. Large multiloculated right pleural effusion. Recommend diagnostic and therapeutic thoracentesis. 2.  Complete atelectasis of the right upper lobe. Right upper lobe airway secretions. 3.  Compressive atelectasis in the right lower lobe and middle lobe. 4.  Ill-defined nodular opacities in left upper lobe likely infectious or inflammatory but nonspecific.   5.  Mild mediastinal and bilateral supraclavicular lymphadenopathy. ABDOMEN AND PELVIS      1. Cirrhotic liver with stigmata of portal hypertension including splenomegaly, mild ascites and esophageal and gastric varices. 2.  Jessica hepatis, celiac axis and retroperitoneal lymphadenopathy. 3.  Diffusely thickened bowel and stomach may represent sequela of portal hypertension versus infectious/inflammatory. 4.  Anasarca. 5.  Right inguinal hernia containing small bowel without evidence of complication. CT HEAD WO CONTRAST   Final Result      1. No acute intracranial hemorrhage or mass effect. 2.  Sinus disease. XR CHEST (2 VW)   Final Result      Extensive right pleural-parenchymal opacities, which can be seen with pneumonia or malignancy.  CT chest with contrast.                  Assessment/Plan:   Tomas Nieto is a 54M with a PMHx of unconfirmed HCV, IVDU x10 years, and 35 pack-year smoking history who p/w abdominal pain x3 months, SOB x3 months, and new-onset vomiting (x10 episodes/day). He was admitted for decompensated cirrhosis with ascites, sepsis, hematuria, and substance use disorder. Decompensated cirrhosis with ascites likely 2/2 hepatitis vs. Malignancy  Hxo IVDU x10 years, increasing abdominal girth, says he has diagnosis of HCV, no record. PE shows ascites, asterixis, scleral icterus  CT abd/pelvis w/con 7/25/21 shows cirrhotic liver with portal HTN, splenomegaly, mild ascites, esophageal/gastric varices, portal hepatitis. Retroperitoneal LAD. CT chest w/cont 7/25/21 shows mediastinal and bilateral supraclavicular LAD. LFTs OA and now are consistent with cirrhosis  - Paracentesis and thoracentesis scheduled for this afternoon with IR.  - Elevated INR (1.85) and PT (21.5)  - Albumin 25g IV q8h  - Pending Hepatitis panels  - HIV Ag/Ab non-reactive 7/26/2021  - Lasix 40mg IV,   - Hold spironolactone 100mg IV d/t current hyperkalemia  - Continue rifaxamin and lactulose  - Empiric ceftriaxone for SBP ppx  - Consult GI for cirrhosis, potential for esophageal variceal bleed. Sepsis 2/2 suspected pulmonary etiology  Hxo 1 week of cough productive of green sputum, chronic worsening shortness of breath (x3 months), 35 pack-year smoking history. OA WBC 35.9, tachycardia (106), hypertensive (176/86). CT Chest 7/25/21 shows large multiloculated right pleural effusion, atelectasis of RUL (complete), RML and RLL (compresison), GEORGINA nodular opacities, mediastinal and BL supraclavicular LAD. - Empiric azithromycin for suspected community acquired pneumonia  - HIV ag/ab non-reactive  - Hepatitis panels pending   - O2 if necessary to maintain O2 sat of >90%. - Consult infectious disease    Hematuria   Hxo hematuria x 4-5 months, transient, painless, dark red throughout. 35 py smoking history.   UA 7/25/21 shows 21-50 RBC hpf  CT abd/pelvis w/contrast shows no abnormalities in the bladder  - Bladder scan with possible cystoscopy  - Repeat U/A  - Consult urology    Bilateral lower extremity rash 2/2 infection vs. HCV vs. Immunodeficiency   Hxo rash x3 months.  Undocumented hxo HCV (untreated), 10 year IVDU  OA WBC 35.9  PE ankle to knee, erythematous, non-tender, scaly.  - HIV ag/ab non-reactive  - Hepatitis panel pending  - Monitor for any growth/progression of rash    Substance use disorder, IVDU  10 year hxo IVDU fentanyl tid  PE shows tract marks on BL upper extremities  - UDS pending  - HIV, hepatitis panels pending  - COWS protocol  - Social work consulted    Code Status: FULL  FEN:   PPX:   DISPO:     Kevanlilly Simeon, MS-4  07/26/21  8:46 AM    This patient has been staffed and discussed with Francis Merchant MD.

## 2021-07-26 NOTE — PROGRESS NOTES
4 Eyes Admission Assessment     I agree as the admission nurse that 2 RN's have performed a thorough Head to Toe Skin Assessment on the patient. ALL assessment sites listed below have been assessed on admission. Areas assessed by both nurses:   [x]   Head, Face, and Ears   [x]   Shoulders, Back, and Chest  [x]   Arms, Elbows, and Hands   [x]   Coccyx, Sacrum, and Ischium  [x]   Legs, Feet, and Heels        Does the Patient have Skin Breakdown?   Yes a wound was noted on the Admission Assessment and an LDA was Initiated documentation include the Wendy-wound, Wound Assessment, Measurements, Dressing Treatment, Drainage, and Color\",         Noah Prevention initiated:  Yes   Wound Care Orders initiated:  No      WOC nurse consulted for Pressure Injury (Stage 3,4, Unstageable, DTI, NWPT, and Complex wounds) or Noah score 18 or lower:  Yes      Nurse 1 eSignature: Electronically signed by Navi Bowden RN on 7/26/21 at 3:51 AM EDT    **SHARE this note so that the co-signing nurse is able to place an eSignature**    Nurse 2 eSignature: Electronically signed by Angelina Schmidt RN on 7/26/21 at 5:37 AM EDT

## 2021-07-26 NOTE — ED PROVIDER NOTES
[address] ENCOUNTER          ATTENDING PHYSICIAN NOTE       Date of evaluation: 7/25/2021    Chief Complaint     Shortness of Breath and Abdominal Pain      History of Present Illness     Allen Martinez is a 45 y.o. male with h/o hep C who presents with SOB and abdominal pain for months. It is not clear why he came specifically today. The patient overall is a very poor historian and has not been to a doctor in quite some time. He complains of a cough and right-sided abdominal pain but cannot be much more specific than that. Review of Systems     Review of Systems   Constitutional: Negative for chills and fever. Respiratory: Positive for cough and shortness of breath. Cardiovascular: Negative for chest pain. Gastrointestinal: Positive for abdominal pain. Negative for diarrhea, nausea and vomiting. All other systems reviewed and are negative. Past Medical, Surgical, Family, and Social History     He has no past medical history on file. He has no past surgical history on file. His family history is not on file. He reports that he has been smoking cigarettes. He has been smoking about 1.00 pack per day. He has never used smokeless tobacco. He reports current drug use. Drug: IV. He reports that he does not drink alcohol. Medications     Previous Medications    No medications on file       Allergies     He has No Known Allergies. Physical Exam     INITIAL VITALS: BP: (!) 172/86, Temp: 99.5 °F (37.5 °C), Pulse: 106, Resp: 13, SpO2: 93 %   Physical Exam  Vitals and nursing note reviewed. Constitutional:       General: He is sleeping. He is not in acute distress. Appearance: He is well-developed. He is not diaphoretic. Eyes:      Extraocular Movements: Extraocular movements intact. Pupils: Pupils are equal, round, and reactive to light. Cardiovascular:      Rate and Rhythm: Regular rhythm. Tachycardia present.    Pulmonary:      Effort: Accessory muscle usage present. Breath sounds: Rales present. Abdominal:      General: Bowel sounds are decreased. There is no distension. Palpations: Abdomen is soft. Tenderness: There is abdominal tenderness in the right upper quadrant, right lower quadrant, periumbilical area and suprapubic area. Hernia: A hernia is present. Hernia is present in the right inguinal area (reducible). Musculoskeletal:         General: Normal range of motion. Skin:     General: Skin is warm and dry. Neurological:      Mental Status: He is easily aroused. Diagnostic Results     EKG   Interpreted by Mavis Malhotra MD     Rhythm: sinus tach  Rate: normal  Axis: normal  Ectopy: none  Conduction: normal  ST Segments: no acute change  T Waves: no acute change  Q Waves: v1-3    Clinical Impression: sinus tach    RADIOLOGY:  CT ABDOMEN PELVIS W IV CONTRAST Additional Contrast? None   Final Result      CHEST      1. Large multiloculated right pleural effusion. Recommend diagnostic and therapeutic thoracentesis. 2.  Complete atelectasis of the right upper lobe. Right upper lobe airway secretions. 3.  Compressive atelectasis in the right lower lobe and middle lobe. 4.  Ill-defined nodular opacities in left upper lobe likely infectious or inflammatory but nonspecific.   5.  Mild mediastinal and bilateral supraclavicular lymphadenopathy. ABDOMEN AND PELVIS      1. Cirrhotic liver with stigmata of portal hypertension including splenomegaly, mild ascites and esophageal and gastric varices. 2.  Jessica hepatis, celiac axis and retroperitoneal lymphadenopathy. 3.  Diffusely thickened bowel and stomach may represent sequela of portal hypertension versus infectious/inflammatory. 4.  Anasarca. 5.  Right inguinal hernia containing small bowel without evidence of complication. CT CHEST W CONTRAST   Final Result      CHEST      1. Large multiloculated right pleural effusion.  Recommend diagnostic and therapeutic thoracentesis. 2.  Complete atelectasis of the right upper lobe. Right upper lobe airway secretions. 3.  Compressive atelectasis in the right lower lobe and middle lobe. 4.  Ill-defined nodular opacities in left upper lobe likely infectious or inflammatory but nonspecific.   5.  Mild mediastinal and bilateral supraclavicular lymphadenopathy. ABDOMEN AND PELVIS      1. Cirrhotic liver with stigmata of portal hypertension including splenomegaly, mild ascites and esophageal and gastric varices. 2.  Jessica hepatis, celiac axis and retroperitoneal lymphadenopathy. 3.  Diffusely thickened bowel and stomach may represent sequela of portal hypertension versus infectious/inflammatory. 4.  Anasarca. 5.  Right inguinal hernia containing small bowel without evidence of complication. CT HEAD WO CONTRAST   Final Result      1. No acute intracranial hemorrhage or mass effect. 2.  Sinus disease. XR CHEST (2 VW)   Final Result      Extensive right pleural-parenchymal opacities, which can be seen with pneumonia or malignancy.  CT chest with contrast.                LABS:   Results for orders placed or performed during the hospital encounter of 07/25/21   Hepatic Function Panel   Result Value Ref Range    Total Protein 7.1 6.4 - 8.2 g/dL    Albumin 2.2 (L) 3.4 - 5.0 g/dL    Alkaline Phosphatase 121 40 - 129 U/L    ALT 29 10 - 40 U/L    AST 63 (H) 15 - 37 U/L    Total Bilirubin 5.1 (H) 0.0 - 1.0 mg/dL    Bilirubin, Direct 1.8 (H) 0.0 - 0.3 mg/dL    Bilirubin, Indirect 3.3 (H) 0.0 - 1.0 mg/dL   Lipase   Result Value Ref Range    Lipase 33.0 13.0 - 60.0 U/L   Basic Metabolic Panel w/ Reflex to MG   Result Value Ref Range    Sodium 134 (L) 136 - 145 mmol/L    Potassium reflex Magnesium 5.3 (H) 3.5 - 5.1 mmol/L    Chloride 98 (L) 99 - 110 mmol/L    CO2 22 21 - 32 mmol/L    Anion Gap 14 3 - 16    Glucose 50 (L) 70 - 99 mg/dL    BUN 23 (H) 7 - 20 mg/dL    CREATININE 0.9 0.9 - 1.3 mg/dL    GFR Blood, Urine LARGE (A) Negative    pH, UA 5.5 5.0 - 8.0    Protein,  (A) Negative mg/dL    Urobilinogen, Urine 2.0 (A) <2.0 E.U./dL    Nitrite, Urine POSITIVE (A) Negative    Leukocyte Esterase, Urine TRACE (A) Negative    Microscopic Examination YES     Urine Type Voided     Urine Reflex to Culture Not Indicated    Ammonia   Result Value Ref Range    Ammonia 69 (H) 16 - 60 umol/L   Basic Metabolic Panel w/ Reflex to MG   Result Value Ref Range    Sodium 132 (L) 136 - 145 mmol/L    Potassium reflex Magnesium 7.0 (HH) 3.5 - 5.1 mmol/L    Chloride 98 (L) 99 - 110 mmol/L    CO2 22 21 - 32 mmol/L    Anion Gap 12 3 - 16    Glucose 131 (H) 70 - 99 mg/dL    BUN 24 (H) 7 - 20 mg/dL    CREATININE 0.7 (L) 0.9 - 1.3 mg/dL    GFR Non-African American >60 >60    GFR African American >60 >60    Calcium 7.5 (L) 8.3 - 10.6 mg/dL   Potassium (Lab)   Result Value Ref Range    Potassium 5.5 (H) 3.5 - 5.1 mmol/L   Microscopic Urinalysis   Result Value Ref Range    WBC, UA 3-5 0 - 5 /HPF    RBC, UA 21-50 (A) 0 - 4 /HPF    Epithelial Cells, UA 2-5 0 - 5 /HPF    Bacteria, UA 1+ (A) None Seen /HPF    Amorphous, UA 3+ /HPF       RECENT VITALS:  BP: 121/78,Temp: 99.5 °F (37.5 °C), Pulse: 90, Resp: 13, SpO2: 93 %       ED Course     Nursing Notes, Past Medical Hx, Past Surgical Hx, Social Hx,Allergies, and Family Hx were reviewed.     patient was given the following medications:  Orders Placed This Encounter   Medications    lactated ringers bolus    dextrose 50 % solution     Annamaria Cai: cabinet override    ondansetron (ZOFRAN) injection 4 mg    iopamidol (ISOVUE-370) 76 % injection 80 mL    cefTRIAXone (ROCEPHIN) 1000 mg IVPB in 50 mL D5W minibag     Order Specific Question:   Antimicrobial Indications     Answer:   Bloodstream Infection       CONSULTS:  IP CONSULT TO HOSPITALIST    MEDICAL DECISIONMAKING / ASSESSMENT / Romaine Sunil is a 45 y.o. male with a history of hepatitis C and no medical care presenting with abdominal pain as well as shortness of breath. The patient's evaluation revealed significant leukocytosis, lactic acidosis, hypocalcemia, and bumps in BNP and troponin of unclear etiology at this time. EKG was not demonstrating any acute ischemia. Ammonia level was 69. Chest x-ray revealed extensive right pleural parenchymal opacities and therefore CT scan was performed of the chest abdomen and pelvis. CT of the chest revealed large right loculated pleural effusion with atelectasis but no discrete mass identified. There was opacification of the right upper lobe likely secondary to secretions. A pericardial effusion was seen. On abdominal CT he was noted to have gastric varices and esophageal varices with mild ascites and a cirrhotic liver. He was also noted to have a right inguinal hernia without complication. Head CT was performed and is unremarkable. The patient was started on empiric antibiotics although no definitive source of infection was found on his initial work-up. Blood cultures were obtained on this patient prior to antibiotics and IV fluids were given to address the lactic acidosis. The patient will be admitted for further management as he will need a diagnostic and therapeutic thoracentesis and management of Hep C cirrhosis. Clinical Impression     1. Pleural effusion    2. Hypoxia    3.  Hepatitis C virus infection without hepatic coma, unspecified chronicity        Disposition     DISPOSITION Decision To Admit 07/26/2021 12:57:47 AM       Viridiana Villatoro MD  07/26/21 5784

## 2021-07-26 NOTE — CONSULTS
Nephrology Consult Note                                                                                                                                                                                                                                                                                                                                                               Office : [phone]     Fax :[phone]              Patient's Name: Laura Ramírez  12:23 PM  7/26/2021    Reason for Consult:  Adrian   Requesting Physician:  Dr Stan Espinoza     Chief Complaint: abd pain     History of Present Ilness:    Laura Ramírez is a 46 y/o w/ a PMH of hepatitis C, IV drug use, and no previous medical care who presents with adominal pain, emesis, and shortness of breath. Patient reports that about a year ago he started to notice bloating in his abdomen. He has had a couple month history of lower right quadrant abdominal pain. He says that the pain is a 5/10 dull, achy pain that does not radiate. He says that the pain tends to come and go and nothing makes it better or worse. He reports a couple month hx of SOB. The patient has a 35 pack year smoking history. 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 He has noticed increased shortness of breath while working and walking around. Diarrhea +  K elevated - on aldactone at home   History reviewed. No pertinent surgical history. History reviewed. No pertinent family history. reports that he has been smoking cigarettes. He has been smoking about 1.00 pack per day. He has never used smokeless tobacco. He reports current drug use. Drug: IV. He reports that he does not drink alcohol. Allergies:  Patient has no known allergies.     Current Medications:    sodium chloride flush 0.9 % injection 5-40 mL, 2 times per day  sodium chloride flush 0.9 % injection 5-40 mL, PRN  0.9 % sodium chloride infusion, PRN  ondansetron (ZOFRAN-ODT) disintegrating tablet 4 mg, Q8H PRN Or  ondansetron (ZOFRAN) injection 4 mg, Q6H PRN  [START ON 7/27/2021] cefTRIAXone (ROCEPHIN) 1000 mg IVPB in 50 mL D5W minibag, Q24H  [Held by provider] spironolactone (ALDACTONE) tablet 100 mg, Daily  rifaximin (XIFAXAN) tablet 200 mg, TID  lactulose (CHRONULAC) 10 GM/15ML solution 20 g, TID  [START ON 7/27/2021] azithromycin (ZITHROMAX) 250 mg in dextrose 5 % 250 mL IVPB, Q24H  sodium chloride flush 0.9 % injection 5-40 mL, 2 times per day  sodium chloride flush 0.9 % injection 5-40 mL, PRN  0.9 % sodium chloride infusion, PRN  furosemide (LASIX) injection 40 mg, BID  traMADol (ULTRAM) tablet 50 mg, PRN   And  cloNIDine (CATAPRES) tablet 0.1 mg, PRN  albumin human 25 % IV solution 25 g, Q8H        Review of Systems:   14 point ROS obtained but were negative except mentioned in HPI      Physical exam:     Vitals:  /78   Pulse 94   Temp 98.6 °F (37 °C) (Oral)   Resp 18   Ht 5' 10\" (1.778 m)   Wt 222 lb 7.1 oz (100.9 kg)   SpO2 95%   BMI 31.92 kg/m²   Constitutional:  AA   Skin: no rash, turgor wnl  Heent:  eomi, mmm  Neck: no bruits or jvd noted  Cardiovascular:  S1, S2 without m/r/g  Respiratory: CTA B without w/r/r  Abdomen:  +bs, soft, nt, nd  Ext: + lower extremity edema  Psychiatric: mood and affect appropriate  Musculoskeletal:  Rom, muscular strength intact    Data:   Labs:  CBC:   Recent Labs     07/25/21 2055 07/26/21  0539   WBC 35.9* 26.4*   HGB 13.4* 12.0*   * 85*     BMP:    Recent Labs     07/25/21 2055 07/25/21 2055 07/25/21 2204 07/26/21  0030 07/26/21  0539   *  --  132*  --  136   K 5.3*   < > 7.0* 5.5* 5.2*   CL 98*  --  98*  --  101   CO2 22  --  22  --  26   BUN 23*  --  24*  --  23*   CREATININE 0.9  --  0.7*  --  0.8*   GLUCOSE 50*  --  131*  --  100*    < > = values in this interval not displayed.      Ca/Mg/Phos:   Recent Labs     07/25/21 2055 07/25/21 2204 07/26/21  0539   CALCIUM 7.8* 7.5* 7.8*     Hepatic:   Recent Labs     07/25/21 2055 07/26/21  0539 AST 63* 51*   ALT 29 25   BILITOT 5.1* 4.3*   ALKPHOS 121 97     Troponin:   Recent Labs     07/25/21 2055   TROPONINI 0.02*     BNP: No results for input(s): BNP in the last 72 hours. Lipids: No results for input(s): CHOL, TRIG, HDL, LDLCALC, LABVLDL in the last 72 hours. ABGs: No results for input(s): PHART, PO2ART, IAL3LXO in the last 72 hours. INR:   Recent Labs     07/26/21  0539   INR 1.85*     UA:  Recent Labs     07/25/21 2055   COLORU Yellow   CLARITYU Clear   GLUCOSEU Negative   BILIRUBINUR MODERATE*   KETUA 15*   SPECGRAV >=1.030   BLOODU LARGE*   PHUR 5.5   PROTEINU 100*   UROBILINOGEN 2.0*   NITRU POSITIVE*   LEUKOCYTESUR TRACE*   LABMICR YES   URINETYPE Voided      Urine Microscopic:   Recent Labs     07/25/21 2055   BACTERIA 1+*   WBCUA 3-5   RBCUA 21-50*   EPIU 2-5     Urine Culture: No results for input(s): LABURIN in the last 72 hours. Urine Chemistry: No results for input(s): Lucillie Jumper, PROTEINUR, NAUR in the last 72 hours. IMAGING:  CT ABDOMEN PELVIS W IV CONTRAST Additional Contrast? None   Final Result      CHEST      1. Large multiloculated right pleural effusion. Recommend diagnostic and therapeutic thoracentesis. 2.  Complete atelectasis of the right upper lobe. Right upper lobe airway secretions. 3.  Compressive atelectasis in the right lower lobe and middle lobe. 4.  Ill-defined nodular opacities in left upper lobe likely infectious or inflammatory but nonspecific.   5.  Mild mediastinal and bilateral supraclavicular lymphadenopathy. ABDOMEN AND PELVIS      1. Cirrhotic liver with stigmata of portal hypertension including splenomegaly, mild ascites and esophageal and gastric varices. 2.  Jessica hepatis, celiac axis and retroperitoneal lymphadenopathy. 3.  Diffusely thickened bowel and stomach may represent sequela of portal hypertension versus infectious/inflammatory. 4.  Anasarca.    5.  Right inguinal hernia containing small bowel without evidence of complication. CT CHEST W CONTRAST   Final Result      CHEST      1. Large multiloculated right pleural effusion. Recommend diagnostic and therapeutic thoracentesis. 2.  Complete atelectasis of the right upper lobe. Right upper lobe airway secretions. 3.  Compressive atelectasis in the right lower lobe and middle lobe. 4.  Ill-defined nodular opacities in left upper lobe likely infectious or inflammatory but nonspecific.   5.  Mild mediastinal and bilateral supraclavicular lymphadenopathy. ABDOMEN AND PELVIS      1. Cirrhotic liver with stigmata of portal hypertension including splenomegaly, mild ascites and esophageal and gastric varices. 2.  Jessica hepatis, celiac axis and retroperitoneal lymphadenopathy. 3.  Diffusely thickened bowel and stomach may represent sequela of portal hypertension versus infectious/inflammatory. 4.  Anasarca. 5.  Right inguinal hernia containing small bowel without evidence of complication. CT HEAD WO CONTRAST   Final Result      1. No acute intracranial hemorrhage or mass effect. 2.  Sinus disease. XR CHEST (2 VW)   Final Result      Extensive right pleural-parenchymal opacities, which can be seen with pneumonia or malignancy. CT chest with contrast.            US THORACENTESIS Which side should the procedure be performed? Right    (Results Pending)   US GUIDED PARACENTESIS    (Results Pending)       Assessment/Plan   1. Cirrhosis     2. Anasarca     3. Anemia    4. Acid- base/ Electrolyte imbalance     5.  Hep C     Plan   - Low K diet whenever started   - Albumin + lasix  - Ur studies   - monitor UO and renal function   - labs in am   - Daily wts   - No NSAIDS                 Thank you for allowing us to participate in care of Desean Dos Santos MD  Feel free to contact me   [facility] [address]  Office : [phone]  Fax :[phone]

## 2021-07-27 ENCOUNTER — APPOINTMENT (OUTPATIENT)
Dept: GENERAL RADIOLOGY | Age: 51
DRG: 720 | End: 2021-07-27
Payer: COMMERCIAL

## 2021-07-27 PROBLEM — B95.5 STREPTOCOCCAL BACTEREMIA: Status: ACTIVE | Noted: 2021-07-27

## 2021-07-27 PROBLEM — R78.81 STREPTOCOCCAL BACTEREMIA: Status: ACTIVE | Noted: 2021-07-27

## 2021-07-27 PROBLEM — B95.3 PNEUMOCOCCAL BACTEREMIA: Status: ACTIVE | Noted: 2021-07-27

## 2021-07-27 LAB
A/G RATIO: 0.9 (ref 1.1–2.2)
ABO/RH: NORMAL
ALBUMIN SERPL-MCNC: 2.9 G/DL (ref 3.4–5)
ALP BLD-CCNC: 90 U/L (ref 40–129)
ALT SERPL-CCNC: 21 U/L (ref 10–40)
ANION GAP SERPL CALCULATED.3IONS-SCNC: 8 MMOL/L (ref 3–16)
ANISOCYTOSIS: ABNORMAL
ANTIBODY SCREEN: NORMAL
AST SERPL-CCNC: 44 U/L (ref 15–37)
BASOPHILS ABSOLUTE: 0.1 K/UL (ref 0–0.2)
BASOPHILS RELATIVE PERCENT: 0.5 %
BILIRUB SERPL-MCNC: 3.3 MG/DL (ref 0–1)
BUN BLDV-MCNC: 25 MG/DL (ref 7–20)
CALCIUM SERPL-MCNC: 8.2 MG/DL (ref 8.3–10.6)
CHLORIDE BLD-SCNC: 101 MMOL/L (ref 99–110)
CO2: 29 MMOL/L (ref 21–32)
CREAT SERPL-MCNC: 0.5 MG/DL (ref 0.9–1.3)
CREATININE URINE: 39.4 MG/DL (ref 39–259)
EOSINOPHILS ABSOLUTE: 0.1 K/UL (ref 0–0.6)
EOSINOPHILS RELATIVE PERCENT: 0.8 %
GFR AFRICAN AMERICAN: >60
GFR NON-AFRICAN AMERICAN: >60
GLOBULIN: 3.3 G/DL
GLUCOSE BLD-MCNC: 88 MG/DL (ref 70–99)
HBV SURFACE AB TITR SER: 8.95 MIU/ML
HCT VFR BLD CALC: 36.2 % (ref 40.5–52.5)
HEMOGLOBIN: 12.2 G/DL (ref 13.5–17.5)
HEPATITIS B CORE TOTAL ANTIBODY: POSITIVE
HEPATITIS B SURFACE ANTIGEN INTERPRETATION: REACTIVE
HEPATITIS C ANTIBODY INTERPRETATION: REACTIVE
INR BLD: 1.87 (ref 0.88–1.12)
INR BLD: 2.12 (ref 0.88–1.12)
LV EF: 63 %
LVEF MODALITY: NORMAL
LYMPHOCYTES ABSOLUTE: 2.2 K/UL (ref 1–5.1)
LYMPHOCYTES RELATIVE PERCENT: 14.4 %
MCH RBC QN AUTO: 31.2 PG (ref 26–34)
MCHC RBC AUTO-ENTMCNC: 33.7 G/DL (ref 31–36)
MCV RBC AUTO: 92.7 FL (ref 80–100)
MONOCYTES ABSOLUTE: 0.9 K/UL (ref 0–1.3)
MONOCYTES RELATIVE PERCENT: 5.8 %
NEUTROPHILS ABSOLUTE: 12.1 K/UL (ref 1.7–7.7)
NEUTROPHILS RELATIVE PERCENT: 78.5 %
PDW BLD-RTO: 18.4 % (ref 12.4–15.4)
PLATELET # BLD: 58 K/UL (ref 135–450)
PLATELET SLIDE REVIEW: ABNORMAL
PMV BLD AUTO: 8.7 FL (ref 5–10.5)
POTASSIUM REFLEX MAGNESIUM: 4.7 MMOL/L (ref 3.5–5.1)
PROTHROMBIN TIME: 21.7 SEC (ref 9.9–12.7)
PROTHROMBIN TIME: 24.7 SEC (ref 9.9–12.7)
RBC # BLD: 3.91 M/UL (ref 4.2–5.9)
SODIUM BLD-SCNC: 138 MMOL/L (ref 136–145)
TOTAL PROTEIN: 6.2 G/DL (ref 6.4–8.2)
WBC # BLD: 15.4 K/UL (ref 4–11)

## 2021-07-27 PROCEDURE — 6360000002 HC RX W HCPCS: Performed by: STUDENT IN AN ORGANIZED HEALTH CARE EDUCATION/TRAINING PROGRAM

## 2021-07-27 PROCEDURE — 86850 RBC ANTIBODY SCREEN: CPT

## 2021-07-27 PROCEDURE — 80053 COMPREHEN METABOLIC PANEL: CPT

## 2021-07-27 PROCEDURE — 94664 DEMO&/EVAL PT USE INHALER: CPT

## 2021-07-27 PROCEDURE — 2580000003 HC RX 258: Performed by: STUDENT IN AN ORGANIZED HEALTH CARE EDUCATION/TRAINING PROGRAM

## 2021-07-27 PROCEDURE — 2060000000 HC ICU INTERMEDIATE R&B

## 2021-07-27 PROCEDURE — 6360000002 HC RX W HCPCS: Performed by: INTERNAL MEDICINE

## 2021-07-27 PROCEDURE — 94640 AIRWAY INHALATION TREATMENT: CPT

## 2021-07-27 PROCEDURE — 0W993ZZ DRAINAGE OF RIGHT PLEURAL CAVITY, PERCUTANEOUS APPROACH: ICD-10-PCS | Performed by: RADIOLOGY

## 2021-07-27 PROCEDURE — 6370000000 HC RX 637 (ALT 250 FOR IP): Performed by: STUDENT IN AN ORGANIZED HEALTH CARE EDUCATION/TRAINING PROGRAM

## 2021-07-27 PROCEDURE — P9047 ALBUMIN (HUMAN), 25%, 50ML: HCPCS | Performed by: INTERNAL MEDICINE

## 2021-07-27 PROCEDURE — 99233 SBSQ HOSP IP/OBS HIGH 50: CPT | Performed by: INTERNAL MEDICINE

## 2021-07-27 PROCEDURE — 71045 X-RAY EXAM CHEST 1 VIEW: CPT

## 2021-07-27 PROCEDURE — P9017 PLASMA 1 DONOR FRZ W/IN 8 HR: HCPCS

## 2021-07-27 PROCEDURE — 93306 TTE W/DOPPLER COMPLETE: CPT

## 2021-07-27 PROCEDURE — 85610 PROTHROMBIN TIME: CPT

## 2021-07-27 PROCEDURE — 02HV33Z INSERTION OF INFUSION DEVICE INTO SUPERIOR VENA CAVA, PERCUTANEOUS APPROACH: ICD-10-PCS | Performed by: INTERNAL MEDICINE

## 2021-07-27 PROCEDURE — 94761 N-INVAS EAR/PLS OXIMETRY MLT: CPT

## 2021-07-27 PROCEDURE — 36430 TRANSFUSION BLD/BLD COMPNT: CPT

## 2021-07-27 PROCEDURE — 36415 COLL VENOUS BLD VENIPUNCTURE: CPT

## 2021-07-27 PROCEDURE — 85025 COMPLETE CBC W/AUTO DIFF WBC: CPT

## 2021-07-27 PROCEDURE — 86901 BLOOD TYPING SEROLOGIC RH(D): CPT

## 2021-07-27 PROCEDURE — 2700000000 HC OXYGEN THERAPY PER DAY

## 2021-07-27 PROCEDURE — 86900 BLOOD TYPING SEROLOGIC ABO: CPT

## 2021-07-27 PROCEDURE — 99255 IP/OBS CONSLTJ NEW/EST HI 80: CPT | Performed by: INTERNAL MEDICINE

## 2021-07-27 RX ORDER — SODIUM CHLORIDE FOR INHALATION 3 %
4 VIAL, NEBULIZER (ML) INHALATION PRN
Status: DISCONTINUED | OUTPATIENT
Start: 2021-07-27 | End: 2021-08-02 | Stop reason: HOSPADM

## 2021-07-27 RX ORDER — FUROSEMIDE 10 MG/ML
20 INJECTION INTRAMUSCULAR; INTRAVENOUS ONCE
Status: COMPLETED | OUTPATIENT
Start: 2021-07-27 | End: 2021-07-27

## 2021-07-27 RX ORDER — ALBUMIN (HUMAN) 12.5 G/50ML
25 SOLUTION INTRAVENOUS EVERY 8 HOURS
Status: COMPLETED | OUTPATIENT
Start: 2021-07-27 | End: 2021-07-28

## 2021-07-27 RX ORDER — FUROSEMIDE 10 MG/ML
40 INJECTION INTRAMUSCULAR; INTRAVENOUS 2 TIMES DAILY
Status: DISCONTINUED | OUTPATIENT
Start: 2021-07-27 | End: 2021-07-28

## 2021-07-27 RX ORDER — ALBUTEROL SULFATE 2.5 MG/3ML
2.5 SOLUTION RESPIRATORY (INHALATION) EVERY 4 HOURS PRN
Status: DISCONTINUED | OUTPATIENT
Start: 2021-07-27 | End: 2021-08-02 | Stop reason: HOSPADM

## 2021-07-27 RX ORDER — SODIUM CHLORIDE 9 MG/ML
INJECTION, SOLUTION INTRAVENOUS PRN
Status: DISCONTINUED | OUTPATIENT
Start: 2021-07-27 | End: 2021-07-29

## 2021-07-27 RX ORDER — ALBUTEROL SULFATE 2.5 MG/3ML
2.5 SOLUTION RESPIRATORY (INHALATION) 4 TIMES DAILY
Status: DISCONTINUED | OUTPATIENT
Start: 2021-07-27 | End: 2021-07-29

## 2021-07-27 RX ORDER — PHYTONADIONE 5 MG/1
5 TABLET ORAL ONCE
Status: COMPLETED | OUTPATIENT
Start: 2021-07-27 | End: 2021-07-27

## 2021-07-27 RX ORDER — FUROSEMIDE 10 MG/ML
40 INJECTION INTRAMUSCULAR; INTRAVENOUS 3 TIMES DAILY
Status: DISCONTINUED | OUTPATIENT
Start: 2021-07-27 | End: 2021-07-27

## 2021-07-27 RX ADMIN — Medication 30 ML: at 21:11

## 2021-07-27 RX ADMIN — FUROSEMIDE 40 MG: 10 INJECTION, SOLUTION INTRAVENOUS at 17:24

## 2021-07-27 RX ADMIN — ALBUMIN (HUMAN) 25 G: 0.25 INJECTION, SOLUTION INTRAVENOUS at 09:51

## 2021-07-27 RX ADMIN — SODIUM CHLORIDE 30 MG/ML INHALATION SOLUTION 4 ML: 30 SOLUTION INHALANT at 19:55

## 2021-07-27 RX ADMIN — ALBUTEROL SULFATE 2.5 MG: 2.5 SOLUTION RESPIRATORY (INHALATION) at 19:55

## 2021-07-27 RX ADMIN — TRAMADOL HYDROCHLORIDE 50 MG: 50 TABLET, FILM COATED ORAL at 09:54

## 2021-07-27 RX ADMIN — RIFAXIMIN 200 MG: 200 TABLET ORAL at 08:34

## 2021-07-27 RX ADMIN — CLONIDINE HYDROCHLORIDE 0.1 MG: 0.1 TABLET ORAL at 09:54

## 2021-07-27 RX ADMIN — LACTULOSE 20 G: 20 SOLUTION ORAL at 08:34

## 2021-07-27 RX ADMIN — FUROSEMIDE 20 MG: 10 INJECTION, SOLUTION INTRAMUSCULAR; INTRAVENOUS at 19:04

## 2021-07-27 RX ADMIN — CEFTRIAXONE SODIUM 2000 MG: 2 INJECTION, POWDER, FOR SOLUTION INTRAMUSCULAR; INTRAVENOUS at 12:38

## 2021-07-27 RX ADMIN — Medication 10 ML: at 08:35

## 2021-07-27 RX ADMIN — ALBUMIN (HUMAN) 25 G: 0.25 INJECTION, SOLUTION INTRAVENOUS at 17:24

## 2021-07-27 RX ADMIN — FUROSEMIDE 40 MG: 10 INJECTION, SOLUTION INTRAMUSCULAR; INTRAVENOUS at 08:34

## 2021-07-27 RX ADMIN — ALBUMIN (HUMAN) 25 G: 0.25 INJECTION, SOLUTION INTRAVENOUS at 04:27

## 2021-07-27 RX ADMIN — CEFTRIAXONE 1000 MG: 1 INJECTION, POWDER, FOR SOLUTION INTRAMUSCULAR; INTRAVENOUS at 01:19

## 2021-07-27 RX ADMIN — FUROSEMIDE 40 MG: 10 INJECTION, SOLUTION INTRAVENOUS at 12:43

## 2021-07-27 RX ADMIN — PHYTONADIONE 5 MG: 5 TABLET ORAL at 12:39

## 2021-07-27 RX ADMIN — AZITHROMYCIN MONOHYDRATE 250 MG: 500 INJECTION, POWDER, LYOPHILIZED, FOR SOLUTION INTRAVENOUS at 06:50

## 2021-07-27 ASSESSMENT — ENCOUNTER SYMPTOMS
DIARRHEA: 0
CHOKING: 0
TROUBLE SWALLOWING: 0
BLOOD IN STOOL: 0
BACK PAIN: 1
COUGH: 1
CHEST TIGHTNESS: 0
PHOTOPHOBIA: 0
VOMITING: 0
SHORTNESS OF BREATH: 0
APNEA: 0
SORE THROAT: 0
ABDOMINAL DISTENTION: 1
COLOR CHANGE: 0
ABDOMINAL PAIN: 1
CONSTIPATION: 0
NAUSEA: 1

## 2021-07-27 ASSESSMENT — PAIN SCALES - GENERAL
PAINLEVEL_OUTOF10: 0
PAINLEVEL_OUTOF10: 8

## 2021-07-27 NOTE — CONSULTS
Hepatology Consultation        Patient: Ahmet Hatch  : 1970       Date:  2021    Subjective:       History of Present Illness:  Patient is a 45 y.o.  male admitted with Cirrhosis ([facility] 75.) [K74.60] who is seen in consult for cirrhosis. PMH of hepatitis C, IV drug use, and no previous medical care who presents with adominal pain, emesis, and shortness of breath. Patient reports that about a year ago he started to notice bloating in his abdomen. He has had a couple month history of lower right quadrant abdominal pain. He says that the pain is a 5/10 dull, achy pain that does not radiate. He says that the pain tends to come and go and nothing makes it better or worse. He reports a couple month hx of SOB. The patient has a 35 pack year smoking history. 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 He has noticed increased shortness of breath while working and walking around. Usually he is able to walk long distances without being SOB. He denies chest pain associated w/ his SOB. He reports a worsening cough over the past weak w/ green sputum production. He denies fevers, or chills associated with his abdominal pain, cough, and SOB. Today patient reports having 10 episodes of non-bloody, watery emesis which is what caused him to come in.      Additionally, the patient also reports intermittent \"scrotal swelling\" for three months. He says that his right testicle will swell and increase in size. It is non-painful and nothing makes it better or worse. He reports that the swelling will go away after a couple hours or days. He has also had intermittent hematuria that has been going on for approximately three months. He reports that the hematuria is transient, dark red urine. Nothing makes it better or worse. Lastly, over the past three months patient has had a progressive, bilateral lower extremity rash.  The rash is non-painful, plaque like, erythematous, raised, and completely covers his lower extremities. Told to have Hepatitis C  And liver disease a couple of years ago, while in penitent[place]. Never been treated for HCV. Last IVDA was 2 days ago, Fentanyl. Quit ETOH a while ago. Decompensation History:  Hepatic Encephalopathy: never  Variceal bleeding: never  Ascites: recent diagnosis, bloating for a year  Spontaneous bacterial peritonitis: never  Renal Failure: never    Past Medical History:  History reviewed. No pertinent past medical history. Past Surgical History:  History reviewed. No pertinent surgical history. Past Endoscopic History:    Admission Medications:  No current facility-administered medications on file prior to encounter. No current outpatient medications on file prior to encounter. ASA, NSAID use none. Allergies:  No Known Allergies     Social History:  Social History     Tobacco Use    Smoking status: Current Every Day Smoker     Packs/day: 1.00     Types: Cigarettes    Smokeless tobacco: Never Used   Substance Use Topics    Alcohol use: Never        Family History:  History reviewed. No pertinent family history. No family history of liver disease, liver cancer or metabolic syndrome. Review of Systems:  Pertinent items are noted in HPI. Physical Exam:    BP (!) 144/67   Pulse 77   Temp 97.4 °F (36.3 °C) (Oral)   Resp 18   Ht 5' 10\" (1.778 m)   Wt 222 lb 7.1 oz (100.9 kg)   SpO2 95%   BMI 31.92 kg/m²   General appearance: alert, cooperative, no distress  Muscle mass poor  Anicteric, No Jaundice  Head: Normocephalic, without obvious abnormality  Lungs: clear to auscultation bilaterally, Normal Effort. No evidence of hepatic hydrothorax  Heart: regular rate and rhythm, normal S1 and S2, no murmurs or rubs  Abdomen: abnormal findings:  distended  Extremities: atraumatic, no cyanosis.  3+ edema  Skin: warm and dry  Neuro:  AAOX3, no focal deficit, no asterixis        Data Review:    Recent Labs Streptococcal bacteremia  Resolved Problems:    * No resolved hospital problems. *      · Decompensated Cirrhosis. Etiology HCV +/- ETOH. · Decompensation in the form of anasarca and hepatic hydrothorax. Although loculations of pleural effusion without prior instrumentation argues against HH. Chronic liver disease management:     HE: none. Stop lactulose  Ascites: mild, anasarca more prominent. 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 Fluid analysis pending. Will adjust diuretics. Renal Failure: none  GI Bleeding: none  Infections: none active  HCC: no masses on CT      Will need HCV RX as outpatient. Needs to get compensated first.    Thank you for the opportunity to participate in [facility] care.       Jadyn Melendez MD  [address] [facility] [address] [facility] [place] [facility]  [phone]  7/27/2021

## 2021-07-27 NOTE — PLAN OF CARE
Problem: Falls - Risk of:  Goal: Will remain free from falls  Description: Will remain free from falls  Outcome: Ongoing     Problem: Falls - Risk of:  Goal: Absence of physical injury  Description: Absence of physical injury  Outcome: Ongoing     Problem: Skin Integrity:  Goal: Will show no infection signs and symptoms  Description: Will show no infection signs and symptoms  Outcome: Ongoing     Problem: Skin Integrity:  Goal: Absence of new skin breakdown  Description: Absence of new skin breakdown  Outcome: Ongoing     Problem: Safety:  Goal: Free from intentional harm  Description: Free from intentional harm  Outcome: Ongoing

## 2021-07-27 NOTE — PROCEDURES
Bhumika Jean Baptiste is a 45 y.o. male patient. 1. Pleural effusion    2. Hypoxia    3. Hepatitis C virus infection without hepatic coma, unspecified chronicity      History reviewed. No pertinent past medical history. Blood pressure (!) 146/68, pulse 77, temperature 97.5 °F (36.4 °C), temperature source Oral, resp. rate 18, height 5' 10\" (1.778 m), weight 222 lb 7.1 oz (100.9 kg), SpO2 95 %. Central Line    Date/Time: 7/27/2021 4:46 PM  Performed by: Rylee Juarez MD  Authorized by: Rylee Juarez MD   Consent: Verbal consent obtained. Risks and benefits: risks, benefits and alternatives were discussed  Consent given by: patient  Patient understanding: patient states understanding of the procedure being performed  Patient consent: the patient's understanding of the procedure matches consent given  Procedure consent: procedure consent matches procedure scheduled  Relevant documents: relevant documents present and verified  Test results: test results available and properly labeled  Site marked: the operative site was marked  Imaging studies: imaging studies available  Patient identity confirmed: verbally with patient and arm band  Time out: Immediately prior to procedure a \"time out\" was called to verify the correct patient, procedure, equipment, support staff and site/side marked as required.   Indications: vascular access  Anesthesia: local infiltration    Anesthesia:  Local Anesthetic: lidocaine 1% without epinephrine  Anesthetic total: 4 mL    Sedation:  Patient sedated: no    Preparation: skin prepped with 2% chlorhexidine  Skin prep agent dried: skin prep agent completely dried prior to procedure  Sterile barriers: all five maximum sterile barriers used - cap, mask, sterile gown, sterile gloves, and large sterile sheet  Hand hygiene: hand hygiene performed prior to central venous catheter insertion  Location details: right internal jugular  Patient position: flat  Catheter type: triple lumen  Catheter size: 7 Fr  Pre-procedure: landmarks identified  Ultrasound guidance: yes  Sterile ultrasound techniques: sterile gel and sterile probe covers were used  Number of attempts: 1  Successful placement: yes  Post-procedure: line sutured and dressing applied  Assessment: blood return through all ports,  free fluid flow,  placement verified by x-ray and no pneumothorax on x-ray  Comments: EBL <5cc          Vishal Hammonds MD  7/27/2021

## 2021-07-27 NOTE — PROGRESS NOTES
Lab has attempted several times to get type and screen and INR labs before thoracentesis can be done. All attempts have failed. Dr. Anastasia Clark notified in case a PICC line is necessary. Spoke with Dr. Anastasia Clark. PICC line contraindicated due to bacteremia. Residents have been contact via Dr. Anastasia Clark to come and place central line for pt.

## 2021-07-27 NOTE — PROGRESS NOTES
Patient admitted to room 4324 from [address] [place] [address]. Patient oriented to room, call light, bed rails, phone, lights and bathroom. Patient instructed about the schedule of the day including: vital sign frequency, lab draws, possible tests, frequency of MD and staff rounds, including RN/MD rounding together at bedside, daily weights, and I &O's. Patient instructed about prescribed diet, and television. Bed alarm in place, patient aware of placement and reason. Telemetry box  in place, patient aware of placement and reason. Bed locked, in lowest position, side rails up 3/4, call light within reach. Will continue to monitor.

## 2021-07-27 NOTE — PROGRESS NOTES
Central line placed by residents. CXR obtained. Order ok to use central line obtained. Type and screen and INR sent to lab.

## 2021-07-27 NOTE — PROGRESS NOTES
Progress Note    Admit Date: 7/25/2021  Day: 2  Diet: Diet NPO    Interval history: No acute overnight events. Patient seen and examined in AM. AOx4, in acute distress 2/2 pain, laying in bed. Patient states he no longer has hematuria. AM RN says his urine was pink-tinged this morning but has since become yellow. He denies any pain or burning on urination. Thoracentesis pending this afternoon if INR <1.6  Patient is afebrile and hypertensive, SpO2 95% on 5L NC. Medications:     Scheduled Meds:   cefTRIAXone (ROCEPHIN) IV  2,000 mg Intravenous Q12H    albumin human  25 g Intravenous Q8H    furosemide  40 mg Intravenous BID    sodium chloride flush  5-40 mL Intravenous 2 times per day    [Held by provider] spironolactone  100 mg Oral Daily    azithromycin  250 mg Intravenous Q24H    sodium chloride flush  5-40 mL Intravenous 2 times per day     Continuous Infusions:   sodium chloride      sodium chloride      sodium chloride       PRN Meds:sodium chloride, sodium chloride flush, sodium chloride, ondansetron **OR** ondansetron, sodium chloride flush, sodium chloride, traMADol **AND** cloNIDine    Objective:   Vitals:   T-max:  Patient Vitals for the past 8 hrs:   BP Temp Temp src Pulse Resp SpO2   07/27/21 1146 (!) 144/67 97.4 °F (36.3 °C) Oral 77 18 95 %   07/27/21 0751 (!) 149/66 97.7 °F (36.5 °C) Oral 79 18 95 %   07/27/21 0700 (!) 148/72   80         Intake/Output Summary (Last 24 hours) at 7/27/2021 1345  Last data filed at 7/27/2021 1221  Gross per 24 hour   Intake 0 ml   Output 2725 ml   Net -2725 ml     Review of Systems   Constitutional: Positive for fatigue. Negative for chills and fever. HENT: Negative for sneezing, sore throat and trouble swallowing. Eyes: Negative for photophobia and visual disturbance. Respiratory: Positive for cough (productive of green sputum). Negative for apnea, choking, chest tightness and shortness of breath.     Cardiovascular: Negative for chest pain, Recent Labs     07/25/21 2055 07/26/21 0539 07/27/21  0610   WBC 35.9* 26.4* 15.4*   HGB 13.4* 12.0* 12.2*   HCT 40.5 36.1* 36.2*   * 85* 58*   MCV 93.6 93.1 92.7     Renal:    Recent Labs     07/25/21 2204 07/26/21  0030 07/26/21 0539 07/27/21  0610   *  --  136 138   K 7.0* 5.5* 5.2* 4.7   CL 98*  --  101 101   CO2 22  --  26 29   BUN 24*  --  23* 25*   CREATININE 0.7*  --  0.8* 0.5*   GLUCOSE 131*  --  100* 88   CALCIUM 7.5*  --  7.8* 8.2*   ANIONGAP 12  --  9 8     Hepatic:   Recent Labs     07/25/21 2055 07/26/21 0539 07/27/21  0610   AST 63* 51* 44*   ALT 29 25 21   BILITOT 5.1* 4.3* 3.3*   BILIDIR 1.8*  --   --    PROT 7.1 6.1* 6.2*   LABALBU 2.2* 2.1* 2.9*   ALKPHOS 121 97 90     Troponin:   Recent Labs     07/25/21 2055   TROPONINI 0.02*     BNP: No results for input(s): BNP in the last 72 hours. Lipids: No results for input(s): CHOL, HDL in the last 72 hours. Invalid input(s): LDLCALCU, TRIGLYCERIDE  ABGs:  No results for input(s): PHART, MUJ3WIH, PO2ART, PAA4DDC, BEART, THGBART, J4UAZKMI, USX8EOH in the last 72 hours. INR:   Recent Labs     07/26/21 0539 07/27/21  0827   INR 1.85* 1.87*     Lactate: No results for input(s): LACTATE in the last 72 hours. Cultures:  -----------------------------------------------------------------  RAD:   CT ABDOMEN PELVIS W IV CONTRAST Additional Contrast? None   Final Result      CHEST      1. Large multiloculated right pleural effusion. Recommend diagnostic and therapeutic thoracentesis. 2.  Complete atelectasis of the right upper lobe. Right upper lobe airway secretions. 3.  Compressive atelectasis in the right lower lobe and middle lobe. 4.  Ill-defined nodular opacities in left upper lobe likely infectious or inflammatory but nonspecific.   5.  Mild mediastinal and bilateral supraclavicular lymphadenopathy. ABDOMEN AND PELVIS      1.   Cirrhotic liver with stigmata of portal hypertension including splenomegaly, mild ascites (untreated), 10 year IVDU  PE ankle to knee, erythematous, non-tender, scaly.  - HBV and HCV positive  - Monitor for any growth/progression of symptoms     Substance use disorder, IVDU  10 year hxo IVDU fentanyl tid  PE shows tract marks on BL upper extremities  - HIV negative  - HBV and HCV positive 7/27/2021  - COWS protocol  - Social work consulted    Hematuria (resolving)  Hxo hematuria x 4-5 months, transient, painless, dark red throughout. 35 py smoking history. UA 7/25/21 shows 21-50 RBC hpf  CT abd/pelvis w/contrast shows no abnormalities in the bladder  - Pinked tinged urine in AM, now clear.   - If hematuria recurs, bladder scan with possible cystoscopy, repeat U/A, consult urology  - Monitor urinal for signs of blood  - Follow up with urology for outpatient cystoscopy     Code Status: FULL  FEN: NPO   PPX: IPC  DISPO: IP    Kira Tripp, MS-4  07/27/21  1:45 PM    This patient has been staffed and discussed with Apollo Vick MD.

## 2021-07-27 NOTE — PROGRESS NOTES
On call residents called to bedside for rapid change in pt status. Pt SpO2 86-88% on 12L high flow nasal cannula. Pt was then placed on non re-breather 15L. SpO2 increased to 95%. All other VSS. Pt's sputum production increasing with green, bloody sputum. Loud crackles heard throughout lungs. Pt NPO throughout entire shift and remains NPO now. Transfer order placed for pt to go to PCU. Report called. PCU RN notified of FFP being processed in lab and to be sent up for pt.

## 2021-07-27 NOTE — CONSULTS
Infectious Diseases Inpatient Consult Note    RESIDENT NOTE - reviewed / edited, attending note at bottom    Reason for Consult:   Strep pneumoniae bacteremia  Requesting Physician:   Zoila Driver MD  Primary Care Physician:  No primary care provider on file. History Obtained From:   Pt, EPIC    Admit Date: 7/25/2021  Hospital Day: 3    CHIEF COMPLAINT:       Chief Complaint   Patient presents with    Shortness of Breath    Abdominal Pain       HISTORY OF PRESENT ILLNESS:    Kasia Garcia is a 46 yo male with PMH of hep C, IV drug use and no previous medical care who presents on 7/25/2021 with abdominal pain, emesis and SOB of for a couple months. Pt states had multiple episodes of watery non-bloody emesis which prompted him to come in. He has been having SOB past couple of months that has been worsening. He has a cough productive of green sputum. He always having the feeling of being \"hot or cold\". He also had complaints of intermittent scrotal swelling and right testicle has inc in size and hematuria that has been on and off for 3 months. Progressive BL LE rash that is non-painful. Pt was told he had hep c 10 years ago but has never gotten treatment, he denies any alcohol use but uses IV fentanyl. In ED found to have significant WBC 35.9, lactic acid. 5.4, hypocalcemia 7.8, elevated   and troponin 0.02. Ammonia 69. He was tachycardic and hypertensive. CT chest and abdomen pelvis showed large multiloculated right pleural effusion complete atelectasis of RUL and ML, ill defined nodular opacities in GE[place]GINA. Cirrhotic liver with stigmata of portal HTN, including splenomegaly, mild ascites and esophageal and gastric varices, anasarca, and right inguinal hernia containing small bowel. Blood cultures x2 from 7/25 grew strep pneumoniae. HIV Ab non-reactive, Hep C Ab reactive, and Hep B core ab positive, and S Ag reactive.      Patient is being treated for hepatic encephalopathy 2/2 decompensated cirrhosis right   CARDIAC: RRR, no murmur appreciated  ABD:  + BS, distended, TTP  EXT:  Bilateral LE chronic appearing hypertrophic, nodular lesions, bilateral UE tract marks  NEURO: No focal neurologic findings  PSYCH: Orientation, sensorium, mood normal, lethargic  LINES:  Peripheral iv    DATA:    Lab Results   Component Value Date    WBC 15.4 (H) 07/27/2021    HGB 12.2 (L) 07/27/2021    HCT 36.2 (L) 07/27/2021    MCV 92.7 07/27/2021    PLT 58 (L) 07/27/2021     Lab Results   Component Value Date    CREATININE 0.5 (L) 07/27/2021    BUN 25 (H) 07/27/2021     07/27/2021    K 4.7 07/27/2021     07/27/2021    CO2 29 07/27/2021       Hepatic Function Panel:   Lab Results   Component Value Date    ALKPHOS 90 07/27/2021    ALT 21 07/27/2021    AST 44 07/27/2021    PROT 6.2 07/27/2021    BILITOT 3.3 07/27/2021    BILIDIR 1.8 07/25/2021    IBILI 3.3 07/25/2021    LABALBU 2.9 07/27/2021       Micro:  Blood Cx x2 7/25/2021: strep pneumoniae    Imaging:   CXR 7/25/2021  Extensive right pleural-parenchymal opacities, which can be seen with pneumonia or malignancy. CT chest with contrast.    CT CHEST ABDOMEN AND PELVIS W CONTRAST 7/25/2021  CHEST  1.  Large multiloculated right pleural effusion. Recommend diagnostic and therapeutic thoracentesis. 2.  Complete atelectasis of the right upper lobe. Right upper lobe airway secretions. 3.  Compressive atelectasis in the right lower lobe and middle lobe. 4.  Ill-defined nodular opacities in left upper lobe likely infectious or inflammatory but nonspecific. 5.  Mild mediastinal and bilateral supraclavicular lymphadenopathy    CT ABDOMEN AND PELVIS  1.  Cirrhotic liver with stigmata of portal hypertension including splenomegaly, mild ascites and esophageal and gastric varices. 2.  Jessica hepatis, celiac axis and retroperitoneal lymphadenopathy. 3.  Diffusely thickened bowel and stomach may represent sequela of portal hypertension versus infectious/inflammatory.   4.  Anasarca. 5.  Right inguinal hernia containing small bowel without evidence of complication. CT HEAD WO CONTRAST   1.  No acute intracranial hemorrhage or mass effect. 2.  Sinus disease. ECHO: PENDING     IMPRESSION:    Sheryle Lacks is a 46 yo male with PMH of hep C, IV drug use and no previous medical care who presents on 7/25/2021 with abdominal pain, emesis and SOB of for a couple months. Blood cx grew strep pneumoniae. Being treated for hepatic encephalopathy due to cirrhosis. HIV negative Patient meets sepsis criteria. Patient Active Problem List   Diagnosis    Cirrhosis ([facility] [address]    Pleural effusion    Hepatitis C virus infection without hepatic coma    Hypoxia    Streptococcal bacteremia         RECOMMENDATIONS:  -continue broad rocephin and azithromycin  -HEATHER pending to rule out endocarditis  -thoracentesis pending     Discussed with Dr. Nancy Magdaleno MD     Addendum to Resident Consult note:  Pt seen, examined and evaluated. I have reviewed the current history, physical findings, labs and assessment and plan and agree with note as documented by resident (Dr. Morataya Mt). 46 yo man with no established med hx, hx opiate use (fentanyl)    Presents with SOB, cough with 'green' sputum over weeks, worse  Pt with abd pain, RUQ. Developed nausea / vomiting, reason for coming to ED    In ED 7/25, afeb, WBC 35.9, elevated pro-BNP, alb 2.2, bili 5.1  Imaging with R effusion, lung collapse, cirrhosis with varices  Admit, started on ceftriaxone + azithromycin    IMP/  Substance abuse  Cirrhosis - HCV, HBV positive  R pneumonia  R effusion  S pneumoniae bacteremia, pul source    REC/  Cont ceftriaxone + azithromycin    Thoracentesis / pul catheter drainage - may need multiple, may need intra-pleural thrombolysis  Pul consultation  Will review imaging with Radiologist   Sent S pneumo urine ag    Medical Decision Making:   The following items were considered in medical decision making:  Discussion of patient care with other providers  Reviewed clinical lab tests  Reviewed radiology tests  Reviewed other diagnostic tests/interventions  Independent review of radiologic images - reviewed CXR, Chest CT   Microbiology cultures and other micro tests reviewed      Risk of Complications/Morbidity: High   Illness(es)/ Infection present that pose threat to bodily function. There is potential for severe exacerbation of infection/side effects of treatment.   Therapy requires intensive monitoring for antimicrobial agent toxicity    Discussed with pt, RN, Hailey Joseph / Susu Nunez MD

## 2021-07-27 NOTE — PROGRESS NOTES
Nephrology Note                                                                                                                                                                                                                                                                                                                                                               Office : [phone]     Fax :[phone]              Patient's Name: Altagracia Gauthier  9:24 AM  7/27/2021    Reason for Consult:  Anasayennifer           Uo much better  Edema better   On lasix        reports that he has been smoking cigarettes. He has been smoking about 1.00 pack per day. He has never used smokeless tobacco. He reports current drug use. Drug: IV. He reports that he does not drink alcohol. Allergies:  Patient has no known allergies.     Current Medications:    cefTRIAXone (ROCEPHIN) 2000 mg IVPB in D5W 50ml minibag, Q12H  sodium chloride flush 0.9 % injection 5-40 mL, 2 times per day  sodium chloride flush 0.9 % injection 5-40 mL, PRN  0.9 % sodium chloride infusion, PRN  ondansetron (ZOFRAN-ODT) disintegrating tablet 4 mg, Q8H PRN   Or  ondansetron (ZOFRAN) injection 4 mg, Q6H PRN  [Held by provider] spironolactone (ALDACTONE) tablet 100 mg, Daily  rifaximin (XIFAXAN) tablet 200 mg, TID  lactulose (CHRONULAC) 10 GM/15ML solution 20 g, TID  azithromycin (ZITHROMAX) 250 mg in dextrose 5 % 250 mL IVPB, Q24H  sodium chloride flush 0.9 % injection 5-40 mL, 2 times per day  sodium chloride flush 0.9 % injection 5-40 mL, PRN  0.9 % sodium chloride infusion, PRN  furosemide (LASIX) injection 40 mg, BID  traMADol (ULTRAM) tablet 50 mg, PRN   And  cloNIDine (CATAPRES) tablet 0.1 mg, PRN        Review of Systems:   14 point ROS obtained but were negative except mentioned in HPI      Physical exam:     Vitals:  BP (!) 149/66   Pulse 79   Temp 97.7 °F (36.5 °C) (Oral)   Resp 18   Ht 5' 10\" (1.778 m)   Wt 222 lb 7.1 oz (100.9 kg)   SpO2 95%   BMI 31.92 kg/m²   Constitutional:  AA   Skin: no rash, turgor wnl  Heent:  eomi, mmm  Neck: no bruits or jvd noted  Cardiovascular:  S1, S2 without m/r/g  Respiratory: CTA B without w/r/r  Abdomen:  +bs, soft, nt, nd  Ext: + lower extremity edema  Psychiatric: mood and affect appropriate  Musculoskeletal:  Rom, muscular strength intact    Data:   Labs:  CBC:   Recent Labs     07/25/21 2055 07/26/21 0539 07/27/21  0610   WBC 35.9* 26.4* 15.4*   HGB 13.4* 12.0* 12.2*   * 85* 58*     BMP:    Recent Labs     07/25/21 2204 07/26/21 0030 07/26/21 0539 07/27/21  0610   *  --  136 138   K 7.0* 5.5* 5.2* 4.7   CL 98*  --  101 101   CO2 22  --  26 29   BUN 24*  --  23* 25*   CREATININE 0.7*  --  0.8* 0.5*   GLUCOSE 131*  --  100* 88     Ca/Mg/Phos:   Recent Labs     07/25/21 2204 07/26/21  0539 07/27/21  0610   CALCIUM 7.5* 7.8* 8.2*     Hepatic:   Recent Labs     07/25/21 2055 07/26/21 0539 07/27/21  0610   AST 63* 51* 44*   ALT 29 25 21   BILITOT 5.1* 4.3* 3.3*   ALKPHOS 121 97 90     Troponin:   Recent Labs     07/25/21 2055   TROPONINI 0.02*     BNP: No results for input(s): BNP in the last 72 hours. Lipids: No results for input(s): CHOL, TRIG, HDL, LDLCALC, LABVLDL in the last 72 hours. ABGs: No results for input(s): PHART, PO2ART, CAD9KIT in the last 72 hours. INR:   Recent Labs     07/26/21 0539 07/27/21 0827   INR 1.85* 1.87*     UA:  Recent Labs     07/25/21 2055   COLORU Yellow   CLARITYU Clear   GLUCOSEU Negative   BILIRUBINUR MODERATE*   KETUA 15*   SPECGRAV >=1.030   BLOODU LARGE*   PHUR 5.5   PROTEINU 100*   UROBILINOGEN 2.0*   NITRU POSITIVE*   LEUKOCYTESUR TRACE*   LABMICR YES   URINETYPE Voided      Urine Microscopic:   Recent Labs     07/25/21  2055   BACTERIA 1+*   WBCUA 3-5   RBCUA 21-50*   EPIU 2-5     Urine Culture: No results for input(s): LABURIN in the last 72 hours.   Urine Chemistry:   Recent Labs     07/26/21  1254   LABCREA 39.4   PROTEINUR 11.80             IMAGING:  CT ABDOMEN PELVIS W IV CONTRAST Additional Contrast? None   Final Result      CHEST      1. Large multiloculated right pleural effusion. Recommend diagnostic and therapeutic thoracentesis. 2.  Complete atelectasis of the right upper lobe. Right upper lobe airway secretions. 3.  Compressive atelectasis in the right lower lobe and middle lobe. 4.  Ill-defined nodular opacities in left upper lobe likely infectious or inflammatory but nonspecific.   5.  Mild mediastinal and bilateral supraclavicular lymphadenopathy. ABDOMEN AND PELVIS      1. Cirrhotic liver with stigmata of portal hypertension including splenomegaly, mild ascites and esophageal and gastric varices. 2.  Jessica hepatis, celiac axis and retroperitoneal lymphadenopathy. 3.  Diffusely thickened bowel and stomach may represent sequela of portal hypertension versus infectious/inflammatory. 4.  Anasarca. 5.  Right inguinal hernia containing small bowel without evidence of complication. CT CHEST W CONTRAST   Final Result      CHEST      1. Large multiloculated right pleural effusion. Recommend diagnostic and therapeutic thoracentesis. 2.  Complete atelectasis of the right upper lobe. Right upper lobe airway secretions. 3.  Compressive atelectasis in the right lower lobe and middle lobe. 4.  Ill-defined nodular opacities in left upper lobe likely infectious or inflammatory but nonspecific.   5.  Mild mediastinal and bilateral supraclavicular lymphadenopathy. ABDOMEN AND PELVIS      1. Cirrhotic liver with stigmata of portal hypertension including splenomegaly, mild ascites and esophageal and gastric varices. 2.  Jessica hepatis, celiac axis and retroperitoneal lymphadenopathy. 3.  Diffusely thickened bowel and stomach may represent sequela of portal hypertension versus infectious/inflammatory. 4.  Anasarca. 5.  Right inguinal hernia containing small bowel without evidence of complication.          CT HEAD WO CONTRAST

## 2021-07-27 NOTE — PROGRESS NOTES
Significant event note    Received perfect serve from nurse at 17:47 to come assess patient ASAP as patient's O2 requirement increased from 5L NC to 15L satting in low 90s. Cough with bloody sputum production. On evaluation of patient he was sitting up in bed in mild respiratory distress. Satting 90% on 15L O2. /70, pulse 83, T 97.6. He complained of SOB but no chest pain. Loud, diffuse crackles ausculated throughout b/l lung fields. Patient was put on nonrebreather mask with subsequent improvement in oxygen saturation. Pulmonology (Dr. Prince Bridges) aware of patient. Antibiotics reviewed with Dr. Yoli Jain and coverage deemed appropriate. Decision was made to transfer patient to PCU with telemetry. Patient remains NPO for possible intervention.

## 2021-07-27 NOTE — PROGRESS NOTES
4 Eyes Admission Assessment     I agree as the admission nurse that 2 RN's have performed a thorough Head to Toe Skin Assessment on the patient. ALL assessment sites listed below have been assessed on admission. Areas assessed by both nurses:   [x]   Head, Face, and Ears   [x]   Shoulders, Back, and Chest  [x]   Arms, Elbows, and Hands   [x]   Coccyx, Sacrum, and Ischium  [x]   Legs, Feet, and Heels        Does the Patient have Skin Breakdown?   No         Noah Prevention initiated:  NA   Wound Care Orders initiated:  NA      WOC nurse consulted for Pressure Injury (Stage 3,4, Unstageable, DTI, NWPT, and Complex wounds) or Noah score 18 or lower:  NA      Nurse 1 eSignature: Electronically signed by Deandra Combs RN on 7/27/21 at 7:06 PM EDT    **SHARE this note so that the co-signing nurse is able to place an eSignature**    Nurse 2 eSignature: Electronically signed by Renae Coates RN on 7/96/68 at 9:09 PM EDT

## 2021-07-28 ENCOUNTER — APPOINTMENT (OUTPATIENT)
Dept: CT IMAGING | Age: 51
DRG: 720 | End: 2021-07-28
Payer: COMMERCIAL

## 2021-07-28 ENCOUNTER — APPOINTMENT (OUTPATIENT)
Dept: GENERAL RADIOLOGY | Age: 51
DRG: 720 | End: 2021-07-28
Payer: COMMERCIAL

## 2021-07-28 LAB
A/G RATIO: 1 (ref 1.1–2.2)
ALBUMIN SERPL-MCNC: 3 G/DL (ref 3.4–5)
ALP BLD-CCNC: 79 U/L (ref 40–129)
ALT SERPL-CCNC: 17 U/L (ref 10–40)
ANION GAP SERPL CALCULATED.3IONS-SCNC: 7 MMOL/L (ref 3–16)
APPEARANCE FLUID: NORMAL
AST SERPL-CCNC: 36 U/L (ref 15–37)
BASOPHILS ABSOLUTE: 0 K/UL (ref 0–0.2)
BASOPHILS RELATIVE PERCENT: 0.2 %
BILIRUB SERPL-MCNC: 3.1 MG/DL (ref 0–1)
BLOOD BANK DISPENSE STATUS: NORMAL
BLOOD BANK PRODUCT CODE: NORMAL
BLOOD CULTURE, ROUTINE: ABNORMAL
BLOOD CULTURE, ROUTINE: ABNORMAL
BPU ID: NORMAL
BUN BLDV-MCNC: 25 MG/DL (ref 7–20)
CALCIUM SERPL-MCNC: 8.2 MG/DL (ref 8.3–10.6)
CELL COUNT FLUID TYPE: NORMAL
CHLORIDE BLD-SCNC: 100 MMOL/L (ref 99–110)
CLOT EVALUATION: NORMAL
CO2: 36 MMOL/L (ref 21–32)
COLOR FLUID: YELLOW
CREAT SERPL-MCNC: 0.6 MG/DL (ref 0.9–1.3)
CULTURE, BLOOD 2: ABNORMAL
DESCRIPTION BLOOD BANK: NORMAL
EOSINOPHILS ABSOLUTE: 0.1 K/UL (ref 0–0.6)
EOSINOPHILS RELATIVE PERCENT: 0.9 %
FLUID TYPE: NORMAL
GFR AFRICAN AMERICAN: >60
GFR NON-AFRICAN AMERICAN: >60
GLOBULIN: 3.1 G/DL
GLUCOSE BLD-MCNC: 92 MG/DL (ref 70–99)
GLUCOSE, FLUID: 92 MG/DL
HCT VFR BLD CALC: 30.9 % (ref 40.5–52.5)
HEMOGLOBIN: 10.4 G/DL (ref 13.5–17.5)
INR BLD: 1.86 (ref 0.88–1.12)
INR BLD: 1.96 (ref 0.88–1.12)
INR BLD: 2 (ref 0.88–1.12)
INR BLD: 2.05 (ref 0.88–1.12)
LYMPHOCYTES ABSOLUTE: 1.9 K/UL (ref 1–5.1)
LYMPHOCYTES RELATIVE PERCENT: 18 %
LYMPHOCYTES, BODY FLUID: 13 %
MACROPHAGE FLUID: 9 %
MAGNESIUM: 1.9 MG/DL (ref 1.8–2.4)
MCH RBC QN AUTO: 31.5 PG (ref 26–34)
MCHC RBC AUTO-ENTMCNC: 33.5 G/DL (ref 31–36)
MCV RBC AUTO: 94.2 FL (ref 80–100)
MESOTHELIAL FLUID: 4 %
MONOCYTE, FLUID: 7 %
MONOCYTES ABSOLUTE: 0.6 K/UL (ref 0–1.3)
MONOCYTES RELATIVE PERCENT: 5.3 %
NEUTROPHIL, FLUID: 67 %
NEUTROPHILS ABSOLUTE: 8.1 K/UL (ref 1.7–7.7)
NEUTROPHILS RELATIVE PERCENT: 75.6 %
NUCLEATED CELLS FLUID: 2104 /CUMM
NUMBER OF CELLS COUNTED FLUID: 100
ORGANISM: ABNORMAL
PDW BLD-RTO: 18.4 % (ref 12.4–15.4)
PLATELET # BLD: 64 K/UL (ref 135–450)
PMV BLD AUTO: 8.4 FL (ref 5–10.5)
POTASSIUM REFLEX MAGNESIUM: 3.4 MMOL/L (ref 3.5–5.1)
PROTEIN FLUID: 1 G/DL
PROTHROMBIN TIME: 21.6 SEC (ref 9.9–12.7)
PROTHROMBIN TIME: 22.8 SEC (ref 9.9–12.7)
PROTHROMBIN TIME: 23.3 SEC (ref 9.9–12.7)
PROTHROMBIN TIME: 23.9 SEC (ref 9.9–12.7)
RBC # BLD: 3.28 M/UL (ref 4.2–5.9)
RBC FLUID: 6000 /CUMM
REASON FOR REJECTION: NORMAL
REJECTED TEST: NORMAL
SODIUM BLD-SCNC: 143 MMOL/L (ref 136–145)
TOTAL PROTEIN: 6.1 G/DL (ref 6.4–8.2)
WBC # BLD: 10.8 K/UL (ref 4–11)

## 2021-07-28 PROCEDURE — 2060000000 HC ICU INTERMEDIATE R&B

## 2021-07-28 PROCEDURE — 6370000000 HC RX 637 (ALT 250 FOR IP): Performed by: STUDENT IN AN ORGANIZED HEALTH CARE EDUCATION/TRAINING PROGRAM

## 2021-07-28 PROCEDURE — 82945 GLUCOSE OTHER FLUID: CPT

## 2021-07-28 PROCEDURE — 80053 COMPREHEN METABOLIC PANEL: CPT

## 2021-07-28 PROCEDURE — 89051 BODY FLUID CELL COUNT: CPT

## 2021-07-28 PROCEDURE — 2580000003 HC RX 258: Performed by: STUDENT IN AN ORGANIZED HEALTH CARE EDUCATION/TRAINING PROGRAM

## 2021-07-28 PROCEDURE — 6360000002 HC RX W HCPCS: Performed by: INTERNAL MEDICINE

## 2021-07-28 PROCEDURE — 87070 CULTURE OTHR SPECIMN AEROBIC: CPT

## 2021-07-28 PROCEDURE — 2700000000 HC OXYGEN THERAPY PER DAY

## 2021-07-28 PROCEDURE — C1729 CATH, DRAINAGE: HCPCS

## 2021-07-28 PROCEDURE — 36430 TRANSFUSION BLD/BLD COMPNT: CPT

## 2021-07-28 PROCEDURE — 99223 1ST HOSP IP/OBS HIGH 75: CPT | Performed by: INTERNAL MEDICINE

## 2021-07-28 PROCEDURE — 99233 SBSQ HOSP IP/OBS HIGH 50: CPT | Performed by: INTERNAL MEDICINE

## 2021-07-28 PROCEDURE — 94761 N-INVAS EAR/PLS OXIMETRY MLT: CPT

## 2021-07-28 PROCEDURE — P9047 ALBUMIN (HUMAN), 25%, 50ML: HCPCS | Performed by: INTERNAL MEDICINE

## 2021-07-28 PROCEDURE — 83735 ASSAY OF MAGNESIUM: CPT

## 2021-07-28 PROCEDURE — 83986 ASSAY PH BODY FLUID NOS: CPT

## 2021-07-28 PROCEDURE — 6360000002 HC RX W HCPCS: Performed by: STUDENT IN AN ORGANIZED HEALTH CARE EDUCATION/TRAINING PROGRAM

## 2021-07-28 PROCEDURE — 84157 ASSAY OF PROTEIN OTHER: CPT

## 2021-07-28 PROCEDURE — 94640 AIRWAY INHALATION TREATMENT: CPT

## 2021-07-28 PROCEDURE — 2580000003 HC RX 258: Performed by: INTERNAL MEDICINE

## 2021-07-28 PROCEDURE — 71045 X-RAY EXAM CHEST 1 VIEW: CPT

## 2021-07-28 PROCEDURE — 85610 PROTHROMBIN TIME: CPT

## 2021-07-28 PROCEDURE — 87205 SMEAR GRAM STAIN: CPT

## 2021-07-28 PROCEDURE — 85025 COMPLETE CBC W/AUTO DIFF WBC: CPT

## 2021-07-28 RX ORDER — FUROSEMIDE 10 MG/ML
40 INJECTION INTRAMUSCULAR; INTRAVENOUS 3 TIMES DAILY
Status: COMPLETED | OUTPATIENT
Start: 2021-07-28 | End: 2021-07-29

## 2021-07-28 RX ORDER — ALBUMIN (HUMAN) 12.5 G/50ML
25 SOLUTION INTRAVENOUS EVERY 8 HOURS
Status: COMPLETED | OUTPATIENT
Start: 2021-07-28 | End: 2021-07-29

## 2021-07-28 RX ORDER — SODIUM CHLORIDE 9 MG/ML
INJECTION, SOLUTION INTRAVENOUS PRN
Status: DISCONTINUED | OUTPATIENT
Start: 2021-07-28 | End: 2021-07-29

## 2021-07-28 RX ORDER — FUROSEMIDE 10 MG/ML
40 INJECTION INTRAMUSCULAR; INTRAVENOUS 3 TIMES DAILY
Status: DISCONTINUED | OUTPATIENT
Start: 2021-07-28 | End: 2021-07-28

## 2021-07-28 RX ADMIN — CLONIDINE HYDROCHLORIDE 0.1 MG: 0.1 TABLET ORAL at 06:09

## 2021-07-28 RX ADMIN — ALBUMIN (HUMAN) 25 G: 0.25 INJECTION, SOLUTION INTRAVENOUS at 02:07

## 2021-07-28 RX ADMIN — Medication 10 ML: at 21:00

## 2021-07-28 RX ADMIN — Medication 10 ML: at 10:29

## 2021-07-28 RX ADMIN — AZITHROMYCIN MONOHYDRATE 250 MG: 500 INJECTION, POWDER, LYOPHILIZED, FOR SOLUTION INTRAVENOUS at 06:03

## 2021-07-28 RX ADMIN — FUROSEMIDE 40 MG: 10 INJECTION, SOLUTION INTRAMUSCULAR; INTRAVENOUS at 13:47

## 2021-07-28 RX ADMIN — ALBUMIN (HUMAN) 25 G: 0.25 INJECTION, SOLUTION INTRAVENOUS at 18:09

## 2021-07-28 RX ADMIN — ALBUTEROL SULFATE 2.5 MG: 2.5 SOLUTION RESPIRATORY (INHALATION) at 09:06

## 2021-07-28 RX ADMIN — TRAMADOL HYDROCHLORIDE 50 MG: 50 TABLET, FILM COATED ORAL at 06:09

## 2021-07-28 RX ADMIN — CLONIDINE HYDROCHLORIDE 0.1 MG: 0.1 TABLET ORAL at 13:46

## 2021-07-28 RX ADMIN — TRAMADOL HYDROCHLORIDE 50 MG: 50 TABLET, FILM COATED ORAL at 13:47

## 2021-07-28 RX ADMIN — FUROSEMIDE 40 MG: 10 INJECTION, SOLUTION INTRAVENOUS at 07:50

## 2021-07-28 RX ADMIN — ALBUTEROL SULFATE 2.5 MG: 2.5 SOLUTION RESPIRATORY (INHALATION) at 22:20

## 2021-07-28 RX ADMIN — FUROSEMIDE 40 MG: 10 INJECTION, SOLUTION INTRAMUSCULAR; INTRAVENOUS at 18:09

## 2021-07-28 RX ADMIN — SPIRONOLACTONE 100 MG: 50 TABLET ORAL at 11:40

## 2021-07-28 RX ADMIN — ALBUMIN (HUMAN) 25 G: 0.25 INJECTION, SOLUTION INTRAVENOUS at 10:36

## 2021-07-28 RX ADMIN — ALBUTEROL SULFATE 2.5 MG: 2.5 SOLUTION RESPIRATORY (INHALATION) at 13:07

## 2021-07-28 RX ADMIN — Medication 10 ML: at 07:50

## 2021-07-28 RX ADMIN — ALBUTEROL SULFATE 2.5 MG: 2.5 SOLUTION RESPIRATORY (INHALATION) at 18:36

## 2021-07-28 RX ADMIN — CEFTRIAXONE SODIUM 2000 MG: 2 INJECTION, POWDER, FOR SOLUTION INTRAMUSCULAR; INTRAVENOUS at 13:48

## 2021-07-28 RX ADMIN — POTASSIUM BICARBONATE 20 MEQ: 782 TABLET, EFFERVESCENT ORAL at 18:03

## 2021-07-28 ASSESSMENT — PAIN SCALES - GENERAL
PAINLEVEL_OUTOF10: 0
PAINLEVEL_OUTOF10: 8

## 2021-07-28 ASSESSMENT — ENCOUNTER SYMPTOMS
ABDOMINAL PAIN: 1
CHEST TIGHTNESS: 0
SHORTNESS OF BREATH: 1
VOMITING: 0
WHEEZING: 0
COLOR CHANGE: 0
COUGH: 1
ABDOMINAL DISTENTION: 1
DIARRHEA: 0
PHOTOPHOBIA: 0
NAUSEA: 1
SORE THROAT: 0
TROUBLE SWALLOWING: 0
CONSTIPATION: 0
BACK PAIN: 1

## 2021-07-28 NOTE — SIGNIFICANT EVENT
ICU team responded to rapid response. Patient reported feeling short of breath. Per nursing, patient has had copious thick and bloody secretions and was having difficulty keeping saturations up on 15L of O2 on non-rebreather when we arrived. Of note, patient had a thoracentesis this morning for his loculated right pleural effusion. 850ml of clear yellowish fluid withdrawn. Patient is alert and oriented and able to answer questions. We ordered a stat CXR. As we remained in the room and the patient was suctioned by RT and calmed down, he began to sat better in the 90s, and continued to stay in the 90s even on HFNC.  ICU will continue to monitor and follow up on CXR

## 2021-07-28 NOTE — CARE COORDINATION
Case Management Assessment           Initial Evaluation                Date / Time of Evaluation: 7/28/2021 2:42 PM                 Assessment Completed by: Luisana Kelley RN    Patient Name: Barbara Aguayo     YOB: 1970  Diagnosis: Cirrhosis ([facility] [address] [K74.60]     Date / Time: 7/25/2021  8:32 PM    Patient Admission Status: Inpatient    If patient is discharged prior to next notation, then this note serves as note for discharge by case management. Current PCP: No primary care provider on file. Clinic Patient: No    Chart Reviewed: Yes  Patient/ Family Interviewed: Yes    Initial assessment completed at bedside with: patient    Hospitalization in the last 30 days: No    Emergency Contacts:  No emergency contact information on file. Advance Directives:   Code Status: Full Code      Financial  Payor: Radha Sanchez / Plan: Alicia Buys / Product Type: *No Product type* /     Pre-cert required for SNF: Yes    Pharmacy    [facility] [address] [place] [address] Street  Phone: [phone] Fax: [phone]      Potential assistance Purchasing Medications: Potential Assistance Purchasing Medications: No  Does Patient want to participate in local refill/ meds to beds program?: No    Meds To Beds General Rules:  1. Can ONLY be done Monday- Friday between 8:30am-5pm  2. Prescription(s) must be in pharmacy by 3pm to be filled same day  3. Copy of patient's insurance/ prescription drug card and patient face sheet must be sent along with the prescription(s)  4. Cost of Rx cannot be added to hospital bill. If financial assistance is needed, please contact unit  or ;  or  CANNOT provide pharmacy voucher for patients co-pays  5.  Patients can then  the prescription on their way out of the hospital at discharge, or pharmacy can deliver to the bedside if staff is available. (payment due at time of pick-up or delivery - cash, check, or card accepted)     Able to afford home medications/ co-pay costs: Yes    ADLS  Support Systems: Family Members    PT AM-PAC:   /24  OT AM-PAC:   /24    [place]: apartment  Steps: 20    Plans to RETURN to current housing: Yes  Barriers to RETURNING to current housing: medical complications      Durable Medical Equipment  DME Provider: n/a  Equipment: n/a    Home Oxygen and [address] [place] prior to admission: No  [address] [address]: Not Applicable    DISCHARGE PLAN:  Disposition: Home- No Services Needed    Transportation PLAN for discharge: friend     Factors facilitating achievement of predicted outcomes: Family support    Barriers to discharge: Long standing deficits    Additional Case Management Notes:   Patient is from home with roommates, normally independent, no DME needs, does not drive take the bus. Patient denies any CM needs at this time. Plans to take the bus or have a friend transport home at discharge. The Plan for Transition of Care is related to the following treatment goals of [facility] ([facility] [K74.60]    The Patient and/or patient representative Dinh Rosas and his family were provided with a choice of provider and agrees with the discharge plan Yes    Freedom of choice list was provided with basic dialogue that supports the patient's individualized plan of care/goals and shares the quality data associated with the providers.  Yes    Care Transition patient: No    Jared Melgar RN  [facility] [employer]  Case Management Department  Ph: [phone]   Fax: [phone]

## 2021-07-28 NOTE — PROGRESS NOTES
Progress Note    Admit Date: 7/25/2021  Day: 3  Diet: Diet NPO    Interval history: Yesterday afternoon a central line was placed. Overnight, SpO2 dropped to 86-88% on 12L high flow nasal cannula, so he was placed on non re-breather at 15L where SpO2 increased to 95%. He had increased cough with green, bloody sputum production. He was moved to the PCU, given bronchodilators and Lasix 40mg IV. His O2 requirements have decreased, and this morning he is on 8L high flow NC with SpO2 of 98%. FFP was administered for high INR. Patient seen and examined in AM. AOx4, in mild acute distress due to pain, laying in bed. Patient states his abdomen is not as painful as it was yesterday, but it feels harder to breathe. Afebrile and HDS, on 8L high flow NC with SpO2 98%.       Medications:     Scheduled Meds:   potassium bicarb-citric acid  20 mEq Oral Once    albumin human  25 g Intravenous Q8H    furosemide  40 mg Intravenous TID    cefTRIAXone (ROCEPHIN) IV  2,000 mg Intravenous Q24H    albuterol  2.5 mg Nebulization 4x daily    sodium chloride flush  5-40 mL Intravenous 2 times per day    spironolactone  100 mg Oral Daily    azithromycin  250 mg Intravenous Q24H    sodium chloride flush  5-40 mL Intravenous 2 times per day     Continuous Infusions:   sodium chloride      sodium chloride      sodium chloride      sodium chloride       PRN Meds:sodium chloride, sodium chloride, albuterol, sodium chloride (Inhalant), sodium chloride flush, sodium chloride, ondansetron **OR** ondansetron, sodium chloride flush, sodium chloride, traMADol **AND** cloNIDine    Objective:   Vitals:   T-max:  Patient Vitals for the past 8 hrs:   BP Temp Temp src Pulse Resp SpO2   07/28/21 1308     18 92 %   07/28/21 1210 125/73 98.5 °F (36.9 °C) Oral 88 14 94 %   07/28/21 1125 118/74 98.1 °F (36.7 °C) Oral 89 15    07/28/21 1108 114/73 98 °F (36.7 °C) Oral 86 16 92 %   07/28/21 1105 112/72 98 °F (36.7 °C) Oral 87 14    07/28/21 1018 115/70 97.7 °F (36.5 °C) Axillary 80 14 92 %   07/28/21 1003 115/72 98 °F (36.7 °C) Axillary 85 15 92 %   07/28/21 0949 115/73 98.1 °F (36.7 °C) Oral 82 14 95 %   07/28/21 0936 133/73 98.2 °F (36.8 °C) Oral 86 18 94 %   07/28/21 0907     20 96 %   07/28/21 0746 135/82 98 °F (36.7 °C) Oral 79 14 98 %   07/28/21 0623 125/72            Intake/Output Summary (Last 24 hours) at 7/28/2021 1357  Last data filed at 7/28/2021 0936  Gross per 24 hour   Intake 251 ml   Output 2050 ml   Net -1799 ml     Review of Systems   Constitutional: Negative for chills, diaphoresis, fatigue and fever. HENT: Negative for congestion, sore throat, tinnitus and trouble swallowing. Eyes: Negative for photophobia and visual disturbance. Respiratory: Positive for cough (Yesterday coughed up blood-tinged, green sputum) and shortness of breath. Negative for chest tightness and wheezing. Cardiovascular: Negative for chest pain, palpitations and leg swelling. Gastrointestinal: Positive for abdominal distention, abdominal pain and nausea. Negative for constipation, diarrhea and vomiting. Endocrine: Negative for polyuria. Genitourinary: Negative for difficulty urinating. Musculoskeletal: Positive for back pain (Chronic). Negative for neck pain and neck stiffness. Skin: Negative for color change, pallor, rash and wound. Neurological: Negative for dizziness, tremors, syncope, weakness, light-headedness, numbness and headaches. Hematological: Negative. Psychiatric/Behavioral: Negative. Physical Exam  Constitutional:       Appearance: He is ill-appearing and toxic-appearing. He is not diaphoretic. HENT:      Head: Normocephalic and atraumatic. Mouth/Throat:      Mouth: Mucous membranes are dry. Eyes:      General: Scleral icterus present. Extraocular Movements: Extraocular movements intact. Pupils: Pupils are equal, round, and reactive to light.    Cardiovascular:      Rate and Rhythm: Normal rate and regular rhythm. Heart sounds: No murmur heard. No friction rub. No gallop. Comments: Unable to appreciate dorsalis pedis or anterior tibial pulses BL due to IPCs and severe rash. Pulmonary:      Breath sounds: Wheezing and rhonchi present. Chest:      Chest wall: No tenderness. Abdominal:      General: There is distension. Palpations: Abdomen is soft. There is no mass. Tenderness: There is abdominal tenderness. There is guarding. Hernia: No hernia is present. Musculoskeletal:      Cervical back: No rigidity or tenderness. Skin:     General: Skin is warm and dry. Findings: Rash (Wart-like rash lower extremities BL from ankle to knee) present. Neurological:      Mental Status: He is alert and oriented to person, place, and time. Cranial Nerves: No cranial nerve deficit. Sensory: No sensory deficit. Psychiatric:         Mood and Affect: Mood normal.         Behavior: Behavior normal.           LABS:    CBC:   Recent Labs     07/26/21 0539 07/27/21 0610 07/28/21  0540   WBC 26.4* 15.4* 10.8   HGB 12.0* 12.2* 10.4*   HCT 36.1* 36.2* 30.9*   PLT 85* 58* 64*   MCV 93.1 92.7 94.2     Renal:    Recent Labs     07/26/21 0539 07/27/21  0610 07/28/21  0540    138 143   K 5.2* 4.7 3.4*    101 100   CO2 26 29 36*   BUN 23* 25* 25*   CREATININE 0.8* 0.5* 0.6*   GLUCOSE 100* 88 92   CALCIUM 7.8* 8.2* 8.2*   MG  --   --  1.90   ANIONGAP 9 8 7     Hepatic:   Recent Labs     07/25/21 2055 07/25/21 2055 07/26/21 0539 07/27/21  0610 07/28/21  0540   AST 63*   < > 51* 44* 36   ALT 29   < > 25 21 17   BILITOT 5.1*   < > 4.3* 3.3* 3.1*   BILIDIR 1.8*  --   --   --   --    PROT 7.1   < > 6.1* 6.2* 6.1*   LABALBU 2.2*   < > 2.1* 2.9* 3.0*   ALKPHOS 121   < > 97 90 79    < > = values in this interval not displayed. Troponin:   Recent Labs     07/25/21 2055   TROPONINI 0.02*     BNP: No results for input(s): BNP in the last 72 hours.   Lipids: No results for input(s): CHOL, HDL in the last 72 hours. Invalid input(s): LDLCALCU, TRIGLYCERIDE  ABGs:  No results for input(s): PHART, AKD9JKO, PO2ART, IBU6TCQ, BEART, THGBART, K0CWZSCS, XII5TDP in the last 72 hours. INR:   Recent Labs     07/28/21  0034 07/28/21  0540 07/28/21  1310   INR 2.00* 2.05* 1.86*     Lactate: No results for input(s): LACTATE in the last 72 hours. Cultures:  -----------------------------------------------------------------  RAD:   XR CHEST PORTABLE   Final Result      Worsening infiltrates. CT ABDOMEN PELVIS W IV CONTRAST Additional Contrast? None   Final Result      CHEST      1. Large multiloculated right pleural effusion. Recommend diagnostic and therapeutic thoracentesis. 2.  Complete atelectasis of the right upper lobe. Right upper lobe airway secretions. 3.  Compressive atelectasis in the right lower lobe and middle lobe. 4.  Ill-defined nodular opacities in left upper lobe likely infectious or inflammatory but nonspecific.   5.  Mild mediastinal and bilateral supraclavicular lymphadenopathy. ABDOMEN AND PELVIS      1. Cirrhotic liver with stigmata of portal hypertension including splenomegaly, mild ascites and esophageal and gastric varices. 2.  Jessica hepatis, celiac axis and retroperitoneal lymphadenopathy. 3.  Diffusely thickened bowel and stomach may represent sequela of portal hypertension versus infectious/inflammatory. 4.  Anasarca. 5.  Right inguinal hernia containing small bowel without evidence of complication. CT CHEST W CONTRAST   Final Result      CHEST      1. Large multiloculated right pleural effusion. Recommend diagnostic and therapeutic thoracentesis. 2.  Complete atelectasis of the right upper lobe. Right upper lobe airway secretions. 3.  Compressive atelectasis in the right lower lobe and middle lobe.    4.  Ill-defined nodular opacities in left upper lobe likely infectious or inflammatory but nonspecific.   5.  Mild mediastinal and bilateral supraclavicular lymphadenopathy. ABDOMEN AND PELVIS      1. Cirrhotic liver with stigmata of portal hypertension including splenomegaly, mild ascites and esophageal and gastric varices. 2.  Jessica hepatis, celiac axis and retroperitoneal lymphadenopathy. 3.  Diffusely thickened bowel and stomach may represent sequela of portal hypertension versus infectious/inflammatory. 4.  Anasarca. 5.  Right inguinal hernia containing small bowel without evidence of complication. CT HEAD WO CONTRAST   Final Result      1. No acute intracranial hemorrhage or mass effect. 2.  Sinus disease. XR CHEST (2 VW)   Final Result      Extensive right pleural-parenchymal opacities, which can be seen with pneumonia or malignancy. CT chest with contrast.            CT GUIDED THORACENTESIS    (Results Pending)         Assessment/Plan:   Patient is a 50M with a PMHx of unconfirmed HCV, IVDU x10 years, and 35 pack-year smoking history who p/w abdominal pain x3 months, SOB x3 months, and new-onset vomiting (x10 episodes/day). He was admitted for decompensated cirrhosis with ascites, sepsis, hematuria, and substance use disorder. Afebrile and HDS.       Loculated pleural effusion  Strep pneumonia bacteremia  Overnight O2 desaturation, put on 12L non-rebreather to ensure O2 sat >90%    Hxo 1 week of cough productive of green sputum, chronic worsening shortness of breath (x3 months), 35 pack-year smoking history. OA WBC 35.9, tachycardia (106), hypertensive (176/86). CT Chest 7/25/21 shows large multiloculated right pleural effusion, atelectasis of RUL (complete), RML and RLL (compresison), GEORGINA nodular opacities, mediastinal and BL supraclavicular LAD. TTE 7/27/21 showed Mitral leaflet thickening which could represent a vegetation.  LVEF 60-65%, mild mitral regurgitation, mild tricuspid regurgitation.  - Moderate suspicion for infective endocarditis per modified [facility]'s Criteria, HEATHER pending  - Thoracentesis planned by IR to tap the multiloculated effusion seen on CT chest when INR <1.6, per IR. - Blood cultures positive for strep pneumoniae, pending sensitivities  - Continue empiric azithromycin and ceftriaxone for bacteremia and suspected community acquired pneumonia. - Escalate O2 if necessary to maintain O2 sat of >90%. - Infectious disease, nephrology, and pulmonology consulted, rec's appreciated. - Follow-up repeat blood cultures collected on 7/27    Hepatic encephalopathy d/t decompensated cirrhosis with ascites likely 2/2 hepatitis vs. Malignancy  Hxo IVDU x10 years, increasing abdominal girth, says he has diagnosis of HCV, no record. PE shows ascites, asterixis, scleral icterus  CT abd/pelvis w/con 7/25/21 shows cirrhotic liver with portal HTN, splenomegaly, mild ascites, esophageal/gastric varices, portal hepatitis. Retroperitoneal LAD. CT chest w/cont 7/25/21 shows mediastinal and bilateral supraclavicular LAD. LFTs OA and now are consistent with cirrhosis  - Thoracentesis scheduled for this afternoon with IR if INR <1.6. Not enough fluid for paracentesis per IR.  - Given Vit K 5mg PO and 2 units FFP with goal <1.6  - Repeat INR (1.86) and PT (21.6)  - Albumin 25g IV q8h  - Lasix 40mg IV tid  - Hold spironolactone 100mg IV d/t  resolving hyperkalemia  - Continue rifaxamin and lactulose  - Track strict I/O to evaluate medication efficiency  - Empiric ceftriaxone for SBP ppx  - Consult GI for cirrhosis with out-patient follow-up      Bilateral lower extremity rash 2/2 HCV vs HPV vs. Chronic venous stasis  Hxo rash x3 months. Undocumented hxo HCV (untreated), 10 year IVDU  PE ankle to knee, erythematous, non-tender, wart-like.   - HBV and HCV positive  - Monitor for any growth/progression of symptoms     Substance use disorder, IVDU  10 year hxo IVDU fentanyl tid  PE shows tract marks on BL upper extremities  - HIV negative  - HBV and HCV positive 7/27/2021  - COWS protocol  - Social work consulted     Hematuria (Resolved)  Hxo hematuria x 4-5 months, transient, painless, dark red throughout. 35 py smoking history.   UA 7/25/21 shows 21-50 RBC hpf  CT abd/pelvis w/contrast shows no abnormalities in the bladder  - Urine now clear, without evidence of blood  - If hematuria recurs, bladder scan with possible cystoscopy, repeat U/A, consult urology  - Monitor urinal for signs of blood  - Follow up with urology for outpatient cystoscopy     Code Status: FULL  FEN: NPO  PPX: IPC  DISPO: PCU    Joe Robbins, MS-4  07/28/21  1:57 PM     Shivani Azevedo MD  PGY-1     This patient has been staffed and discussed with Lashanda Almeida MD.

## 2021-07-28 NOTE — CONSULTS
Pulmonary Consult Note      Reason for Consult: loculated pleural effusions, strep bacteremia   Requesting Physician: Hang Rosenthal    Subjective:     CHIEF COMPLAINT / HPI:                The patient is a 45 y.o. male with significant past medical history of IV drug abuse presented with complaints of shortness of breath and pain. Patient says he's been feeling ill for about a week before coming in. He was non-specific about his complaints, but does endorse chronic pain and chronic use of IV fentanyl. He mostly injects in his arms so it was difficult to get IV access and a central line was placed. He has some abdominal pain and productive cough which he says started since arriving here. His sputum is bloody. He is a former drinker and has tested positive for hep C and hep B. He is on no prescription medications at baseline. Past Medical History:  History reviewed. No pertinent past medical history. Past Surgical History:    History reviewed. No pertinent surgical history. Current Medications:     potassium bicarb-citric acid  20 mEq Oral Once    albumin human  25 g Intravenous Q8H    furosemide  40 mg Intravenous TID    cefTRIAXone (ROCEPHIN) IV  2,000 mg Intravenous Q24H    albuterol  2.5 mg Nebulization 4x daily    sodium chloride flush  5-40 mL Intravenous 2 times per day    spironolactone  100 mg Oral Daily    azithromycin  250 mg Intravenous Q24H    sodium chloride flush  5-40 mL Intravenous 2 times per day     Allergies:  No Known Allergies  Social History:    TOBACCO:   reports that he has been smoking cigarettes. He has been smoking about 1.00 pack per day. He has never used smokeless tobacco.  ETOH:   reports no history of alcohol use. Family History:   History reviewed. No pertinent family history. REVIEW OF SYSTEMS:    CONSTITUTIONAL:  negative for fevers, chills, diaphoresis, activity change, appetite change, fatigue, night sweats and unexpected weight change.    EYES:  negative for blurred vision, eye discharge, visual disturbance and icterus  HEENT:  negative for hearing loss, tinnitus, ear drainage, sinus pressure, nasal congestion, epistaxis and snoring  RESPIRATORY:  See HPI  CARDIOVASCULAR:  negative for chest pain, palpitations, exertional chest pressure/discomfort, syncope. Lower extremity edema. GASTROINTESTINAL:  negative for nausea, vomiting, diarrhea, constipation, blood in stool and abdominal pain  GENITOURINARY:  negative for frequency, dysuria, urinary incontinence, decreased urine volume, and hematuria  HEMATOLOGIC/LYMPHATIC:  negative for easy bruising, bleeding and lymphadenopathy  ALLERGIC/IMMUNOLOGIC:  negative for recurrent infections, angioedema, anaphylaxis and drug reactions  ENDOCRINE:  negative for weight changes and diabetic symptoms including polyuria, polydipsia and polyphagia  MUSCULOSKELETAL:  Chronic back pain, otherwise negative for pain, joint swelling, decreased range of motion and muscle weakness  NEUROLOGICAL:  negative for headaches, slurred speech, unilateral weakness  PSYCHIATRIC/BEHAVIORAL: negative for hallucinations, behavioral problems, confusion and agitation. Objective:   PHYSICAL EXAM:      VITALS:  /74   Pulse 89   Temp 98.1 °F (36.7 °C) (Oral)   Resp 15   Ht 5' 10\" (1.778 m)   Wt 208 lb 15.9 oz (94.8 kg)   SpO2 (!) 87%   BMI 29.99 kg/m²   24HR INTAKE/OUTPUT:      Intake/Output Summary (Last 24 hours) at 2021 1241  Last data filed at 2021 0936  Gross per 24 hour   Intake 251 ml   Output 2050 ml   Net -1799 ml     CURRENT PULSE OXIMETRY:  SpO2: (!) 87 %  24HR PULSE OXIMETRY RANGE:  SpO2  Av.2 %  Min: 87 %  Max: 100 % on 6L    CONSTITUTIONAL:  awake, alert, cooperative, mild distress, and appears stated age.  disheveled  NECK:  Supple, symmetrical, trachea midline, no adenopathy, thyroid symmetric, not enlarged and no tenderness, skin normal  LUNGS:  mil increased work of breathing and clear to auscultation with decreased right sided breath sounds. mid accessory muscle use  CARDIOVASCULAR:  normal S1 and S2, bilateral elehpantiasis edema and no JVD  ABDOMEN:  normal bowel sounds, non-distended and no masses palpated, and no tenderness to palpation. No hepatospleenomegaly  LYMPHADENOPATHY:  no axillary or supraclavicular adenopathy. No cervical adnenopathy  PSYCHIATRIC: Oriented to person place and time. No obvious depression or anxiety. MUSCULOSKELETAL: No obvious misalignment or effusion of the joints. No clubbing, cyanosis of the digits. SKIN:  normal skin color, texture, turgor and no redness, warmth, or swelling.  No palpable nodules    DATA:    Old records have been reviewed  CBC with Differential:    Lab Results   Component Value Date    WBC 10.8 07/28/2021    RBC 3.28 07/28/2021    HGB 10.4 07/28/2021    HCT 30.9 07/28/2021    PLT 64 07/28/2021    MCV 94.2 07/28/2021    MCH 31.5 07/28/2021    MCHC 33.5 07/28/2021    RDW 18.4 07/28/2021    BANDSPCT 9 07/25/2021    LYMPHOPCT 18.0 07/28/2021    MONOPCT 5.3 07/28/2021    BASOPCT 0.2 07/28/2021    MONOSABS 0.6 07/28/2021    LYMPHSABS 1.9 07/28/2021    EOSABS 0.1 07/28/2021    BASOSABS 0.0 07/28/2021     BMP:    Lab Results   Component Value Date     07/28/2021    K 3.4 07/28/2021     07/28/2021    CO2 36 07/28/2021    BUN 25 07/28/2021    CREATININE 0.6 07/28/2021    CALCIUM 8.2 07/28/2021    GFRAA >60 07/28/2021    LABGLOM >60 07/28/2021    GLUCOSE 92 07/28/2021     Hepatic Function Panel:    Lab Results   Component Value Date    ALKPHOS 79 07/28/2021    ALT 17 07/28/2021    AST 36 07/28/2021    PROT 6.1 07/28/2021    BILITOT 3.1 07/28/2021    BILIDIR 1.8 07/25/2021    IBILI 3.3 07/25/2021     ABG:  No results found for: [id number], BEART, H0PEIVRW, PHART, THGBART, BCD7HSM, PO2ART, GIE8HXZ    Cultures:   Blood Culture:    Sputum Culture:        Radiology Review:  All pertinent images / reports were reviewed as a part of this visit. imaging reveals the following:  XR CHEST PORTABLE   Final Result      Worsening infiltrates. CT ABDOMEN PELVIS W IV CONTRAST Additional Contrast? None   Final Result      CHEST      1. Large multiloculated right pleural effusion. Recommend diagnostic and therapeutic thoracentesis. 2.  Complete atelectasis of the right upper lobe. Right upper lobe airway secretions. 3.  Compressive atelectasis in the right lower lobe and middle lobe. 4.  Ill-defined nodular opacities in left upper lobe likely infectious or inflammatory but nonspecific.   5.  Mild mediastinal and bilateral supraclavicular lymphadenopathy. ABDOMEN AND PELVIS      1. Cirrhotic liver with stigmata of portal hypertension including splenomegaly, mild ascites and esophageal and gastric varices. 2.  Jessica hepatis, celiac axis and retroperitoneal lymphadenopathy. 3.  Diffusely thickened bowel and stomach may represent sequela of portal hypertension versus infectious/inflammatory. 4.  Anasarca. 5.  Right inguinal hernia containing small bowel without evidence of complication. CT CHEST W CONTRAST   Final Result      CHEST      1. Large multiloculated right pleural effusion. Recommend diagnostic and therapeutic thoracentesis. 2.  Complete atelectasis of the right upper lobe. Right upper lobe airway secretions. 3.  Compressive atelectasis in the right lower lobe and middle lobe. 4.  Ill-defined nodular opacities in left upper lobe likely infectious or inflammatory but nonspecific.   5.  Mild mediastinal and bilateral supraclavicular lymphadenopathy. ABDOMEN AND PELVIS      1. Cirrhotic liver with stigmata of portal hypertension including splenomegaly, mild ascites and esophageal and gastric varices. 2.  Jessica hepatis, celiac axis and retroperitoneal lymphadenopathy. 3.  Diffusely thickened bowel and stomach may represent sequela of portal hypertension versus infectious/inflammatory. 4.  Anasarca.    5.  Right inguinal hernia containing small bowel without evidence of complication. CT HEAD WO CONTRAST   Final Result      1. No acute intracranial hemorrhage or mass effect. 2.  Sinus disease. XR CHEST (2 VW)   Final Result      Extensive right pleural-parenchymal opacities, which can be seen with pneumonia or malignancy. CT chest with contrast.            CT GUIDED THORACENTESIS    (Results Pending)      Other diagnostic test:      Echo:Summary   Left ventricular cavity size is normal. There is mild left ventricular   hypertrophy. Overall left ventricular systolic function appears normal with   an ejection fraction of 60-65%. No regional wall motion abnormalities are   noted. Normal diastolic function. Very mild mitral regurgitation is present. The mitral valve leaflets are   slightly thickened with normal leaflet mobility. Mild tricuspid regurgitation. The pulmonic valve is not well visualized. Estimated pulmonary artery systolic pressure is at 44 mmHg assuming a right   atrial pressure of 3 mmHg. No definite masses or vegetations were noted, but the mitral leaflet   thickening could represent a vegetation. If there is clinical suspicion of   endocarditis, HEATHER should be considered. Assessment/Plan   45 y.o. male with Strep bacteremia, IVDU, acute hypoxemic respiratory failure, hepatitis and large loculated right pleural effusion. Patient with an unusual distribution of fluid in the right pleural space. Unclear if it is connects, but it appears highly loculated and he's going to need at least one chest tube to drain it. Because of the unusual distribution it would be safest if done with CT guidance. Patient needed several units of FFP to make his INR low enough to get the tube. Will evaluate the fluid after drain is placed and check xray in the morning. Looking at the ct when the tube was placed, it appears the fluid has filled the right hemithorax.        IF, as expected it is inflammatory, he may need infusions of tpa/dornase to evacuate the fluid. Alternatively, given his liver disease and generally poor health, this could be a hepatic hydrothorax and it followed the path of previously loculations until it filled the hemithorax.        Berlin Lopez MD

## 2021-07-28 NOTE — RT PROTOCOL NOTE
RT Nebulizer Bronchodilator Protocol Note    There is a bronchodilator order in the chart from a provider indicating to follow the RT Bronchodilator Protocol and there is an Initiate RT Bronchodilator Protocol order as well (see protocol at bottom of note). The findings from the last RT Protocol Assessment were as follows:  Smoking: <15 Pack years  Surgical Status: No surgery  Xray: One lobe infiltrates/atelectasis/pleural effusion/edema  Respiratory Pattern: RR <12 or 21-30  Mental Status: Alert and Oriented  Breath Sounds: Wheezing and/or rhonchi  Cough: Strong, productive  Activity Level: Walking with assistance  Oxygen Requirement: 61% - 100%  Indication for Bronchodilator Therapy: On home bronchodilators  Bronchodilator Assessment Score: 8    Aerosolized bronchodilator medication orders have been revised according to the RT Bronchodilator Protocol. Pt will benefit from QID and prn HHN. RT Bronchodilator Protocol:    Respiratory Therapist to perform RT Therapy Protocol Assessment then follow the protocol. No Indications  adjust the frequency to every 6 hours PRN wheezing or bronchospasm, if no treatments needed after 48 hours then discontinue using Per Protocol order mode. If indication present, adjust the RT bronchodilator orders based on the Bronchodilator Assessment Score as follows:    0-6  enter or revise RT Bronchodilator order to Albuterol Nebulizer order with frequency of every 2 hours PRN for wheezing or increased work of breathing using Per Protocol order mode. Repeat RT Therapy Protocol Assessment as needed. 7-10 - discontinue any other Inpatient aerosolized bronchodilator medication orders and enter or revise two Albuterol Nebulizer orders, one with BID frequency and one with frequency of every 2 hours PRN wheezing or increased work of breathing using Per Protocol order mode. Repeat RT Therapy Protocol Assessment with second treatment then BID and as needed.       11-13 - discontinue any other Inpatient aerosolized bronchodilator medication orders and enter DuoNeb Nebulizer order with QID frequency and an Albuterol Nebulizer order with frequency of every 2 hours PRN wheezing or increased work of breathing using Per Protocol order mode. Repeat RT Therapy Protocol Assessment with second treatment then QID and as needed. Greater than 13 - discontinue any other Inpatient aerosolized bronchodilator medication orders and enter a DuoNeb Nebulizer order with every 4 hours frequency and an Albuterol Nebulizer order with frequency of every 2 hours PRN wheezing or increased work of breathing using Per Protocol order mode. Repeat RT Therapy Protocol Assessment with second treatment then every 4 hours and as needed. RT to enter RT Home Evaluation for COPD & MDI Assessment order using Per Protocol order mode.     Electronically signed by Marcus South RCP on 7/27/2021 at 8:02 PM

## 2021-07-28 NOTE — PROGRESS NOTES
Returned from thoracentesis. Right mid back with bloody bandaid in place from thoracentesis. Site is oozing bllod, bandaid removed and site covered with vaseline guaze and mepilex border. Direct pressure held for 5 minutes to stop bleeding.

## 2021-07-28 NOTE — PROGRESS NOTES
Nephrology Note                                                                                                                                                                                                                                                                                                                                                               Office : [phone]     Fax :[phone]              Patient's Name: Ines He  9:58 AM  7/28/2021    Reason for Consult:  Anasarca           Uo much better  Edema better   On lasix        reports that he has been smoking cigarettes. He has been smoking about 1.00 pack per day. He has never used smokeless tobacco. He reports current drug use. Drug: IV. He reports that he does not drink alcohol. Allergies:  Patient has no known allergies.     Current Medications:    potassium bicarb-citric acid (EFFER-K) effervescent tablet 20 mEq, Once  0.9 % sodium chloride infusion, PRN  0.9 % sodium chloride infusion, PRN  furosemide (LASIX) injection 40 mg, BID  cefTRIAXone (ROCEPHIN) 2000 mg IVPB in D5W 50ml minibag, Q24H  albuterol (PROVENTIL) nebulizer solution 2.5 mg, Q4H PRN  sodium chloride (Inhalant) 3 % nebulizer solution 4 mL, PRN  albuterol (PROVENTIL) nebulizer solution 2.5 mg, 4x daily  sodium chloride flush 0.9 % injection 5-40 mL, 2 times per day  sodium chloride flush 0.9 % injection 5-40 mL, PRN  0.9 % sodium chloride infusion, PRN  ondansetron (ZOFRAN-ODT) disintegrating tablet 4 mg, Q8H PRN   Or  ondansetron (ZOFRAN) injection 4 mg, Q6H PRN  spironolactone (ALDACTONE) tablet 100 mg, Daily  azithromycin (ZITHROMAX) 250 mg in dextrose 5 % 250 mL IVPB, Q24H  sodium chloride flush 0.9 % injection 5-40 mL, 2 times per day  sodium chloride flush 0.9 % injection 5-40 mL, PRN  0.9 % sodium chloride infusion, PRN  traMADol (ULTRAM) tablet 50 mg, PRN   And  cloNIDine (CATAPRES) tablet 0.1 mg, PRN        Review of Systems:   14 point ROS obtained but were negative except mentioned in HPI      Physical exam:     Vitals:  /73   Pulse 86   Temp 98.2 °F (36.8 °C) (Oral)   Resp 18   Ht 5' 10\" (1.778 m)   Wt 208 lb 15.9 oz (94.8 kg)   SpO2 94%   BMI 29.99 kg/m²   Constitutional:  AA   Skin: no rash, turgor wnl  Heent:  eomi, mmm  Neck: no bruits or jvd noted  Cardiovascular:  S1, S2 without m/r/g  Respiratory: CTA B without w/r/r  Abdomen:  +bs, soft, nt, nd  Ext: + lower extremity edema  Psychiatric: mood and affect appropriate  Musculoskeletal:  Rom, muscular strength intact    Data:   Labs:  CBC:   Recent Labs     07/26/21  0539 07/27/21  0610 07/28/21  0540   WBC 26.4* 15.4* 10.8   HGB 12.0* 12.2* 10.4*   PLT 85* 58* 64*     BMP:    Recent Labs     07/26/21  0539 07/27/21  0610 07/28/21  0540    138 143   K 5.2* 4.7 3.4*    101 100   CO2 26 29 36*   BUN 23* 25* 25*   CREATININE 0.8* 0.5* 0.6*   GLUCOSE 100* 88 92     Ca/Mg/Phos:   Recent Labs     07/26/21  0539 07/27/21  0610 07/28/21  0540   CALCIUM 7.8* 8.2* 8.2*   MG  --   --  1.90     Hepatic:   Recent Labs     07/26/21  0539 07/27/21  0610 07/28/21  0540   AST 51* 44* 36   ALT 25 21 17   BILITOT 4.3* 3.3* 3.1*   ALKPHOS 97 90 79     Troponin:   Recent Labs     07/25/21 2055   TROPONINI 0.02*     BNP: No results for input(s): BNP in the last 72 hours. Lipids: No results for input(s): CHOL, TRIG, HDL, LDLCALC, LABVLDL in the last 72 hours. ABGs: No results for input(s): PHART, PO2ART, TLD1OOG in the last 72 hours.   INR:   Recent Labs     07/27/21  1700 07/28/21  0034 07/28/21  0540   INR 2.12* 2.00* 2.05*     UA:  Recent Labs     07/25/21 2055   COLORU Yellow   CLARITYU Clear   GLUCOSEU Negative   BILIRUBINUR MODERATE*   KETUA 15*   SPECGRAV >=1.030   BLOODU LARGE*   PHUR 5.5   PROTEINU 100*   UROBILINOGEN 2.0*   NITRU POSITIVE*   LEUKOCYTESUR TRACE*   LABMICR YES   URINETYPE Voided      Urine Microscopic:   Recent Labs     07/25/21 2055   BACTERIA 1+*   WBCUA 3-5   RBCUA 21-50* EPIU 2-5     Urine Culture: No results for input(s): LABURIN in the last 72 hours. Urine Chemistry:   Recent Labs     07/26/21  1254   LABCREA 39.4   PROTEINUR 11.80             IMAGING:  XR CHEST PORTABLE   Final Result      Worsening infiltrates. CT ABDOMEN PELVIS W IV CONTRAST Additional Contrast? None   Final Result      CHEST      1. Large multiloculated right pleural effusion. Recommend diagnostic and therapeutic thoracentesis. 2.  Complete atelectasis of the right upper lobe. Right upper lobe airway secretions. 3.  Compressive atelectasis in the right lower lobe and middle lobe. 4.  Ill-defined nodular opacities in left upper lobe likely infectious or inflammatory but nonspecific.   5.  Mild mediastinal and bilateral supraclavicular lymphadenopathy. ABDOMEN AND PELVIS      1. Cirrhotic liver with stigmata of portal hypertension including splenomegaly, mild ascites and esophageal and gastric varices. 2.  Jessica hepatis, celiac axis and retroperitoneal lymphadenopathy. 3.  Diffusely thickened bowel and stomach may represent sequela of portal hypertension versus infectious/inflammatory. 4.  Anasarca. 5.  Right inguinal hernia containing small bowel without evidence of complication. CT CHEST W CONTRAST   Final Result      CHEST      1. Large multiloculated right pleural effusion. Recommend diagnostic and therapeutic thoracentesis. 2.  Complete atelectasis of the right upper lobe. Right upper lobe airway secretions. 3.  Compressive atelectasis in the right lower lobe and middle lobe. 4.  Ill-defined nodular opacities in left upper lobe likely infectious or inflammatory but nonspecific.   5.  Mild mediastinal and bilateral supraclavicular lymphadenopathy. ABDOMEN AND PELVIS      1. Cirrhotic liver with stigmata of portal hypertension including splenomegaly, mild ascites and esophageal and gastric varices.    2.  Jessica hepatis, celiac axis and retroperitoneal lymphadenopathy. 3.  Diffusely thickened bowel and stomach may represent sequela of portal hypertension versus infectious/inflammatory. 4.  Anasarca. 5.  Right inguinal hernia containing small bowel without evidence of complication. CT HEAD WO CONTRAST   Final Result      1. No acute intracranial hemorrhage or mass effect. 2.  Sinus disease. XR CHEST (2 VW)   Final Result      Extensive right pleural-parenchymal opacities, which can be seen with pneumonia or malignancy. CT chest with contrast.            CT GUIDED THORACENTESIS    (Results Pending)       Assessment/Plan   1. Cirrhosis     2. Anasarca     3. Anemia    4. Acid- base/ Electrolyte imbalance     5.  Hep C     Plan   - Low K diet  - aldactone   - Albumin + lasix  - Ur studies - reviewed   - monitor UO and renal function   - labs in am   - Daily wts   - No NSAIDS   - Paracentesis                 Thank you for allowing us to participate in care of Azar Lewis MD  Feel free to contact me   [facility] [address]  Office : [phone]  Fax :[phone]

## 2021-07-28 NOTE — PROGRESS NOTES
I have personally seen and evaluated this patient. I discussed findings and management with the resident,, on 07/27/21  and agree as documented in this note     Unable to attest resident/student note as not assigned as co-signer    Keke Andrade 45 y.o. male  - with current IV fentanyl use, Hep C, has not seen a doctor for many years, p.w nausea and vomiting  - Noted to have leukocytosis WBC 35.9, LA 5.5, tachycardiac, imaging with large right sided multiloculated effusion with complete collapse of the rt middle and lower lung and nodular opacities of left upper lung, cirrhotic appearance of liver with splenomegaly, ascites and portal hypertension, esophageal and gastric varices. Infectious francis showed 2 out 2 blood cx +ve for strep pnuemonia. HIV negative, Hep B Ag +ve. s ab indeterminated and Hep C ab +ve. He was started on Rocephin + Azithromycin for community acquired pneumonia coevrage and SBP. Abdominal diffuse tenderness on exam, lung with decreased breath sounds at the bases and crackles present    Problem List  Acute respiratory failure due to pneumonia and pleural effiusion  Acute metabolic encephalopathy POA possible due to sepsis due to pmneumnia. possibly hepatic encepahloapthy  IVDU, uses IV fentanyl daily  Cirhosis, new diagnosis likely due to HCV, HBV, Etoh use, poor liver synthetic function, INR elevated, low albumin  Right sided loculated effusion  Right sided pneumonai  Strep bacteremia likely due to pneumonia/IVDU/SSI entry  Hematuria  Nicotine dependence  Bilateral chronic venous stasis    Plan:  Continue supplemental O2 to maintain SpO2 > 92%  Abx rocehpin and azithromycin  CVC placement for poor IV access and medications, no tunneled line/PICC until Blood Cx clear  Repeat set of blood cx  ID consult for bacteremia  Echo for evaluation of endocarditis, may need HEATHER if negative for vegetation  GI consulted on admission for evaluation and will need outpatient followup  IR for thoracentesis and paracentesis  Nephrology following, appreciate help with diuresis  Need francis for hematuria as outpatient    Continue inpatient care  CM consult, will likely needs placement for IV abx on dc with bacteremia    Total time spent in care of this patient > 120 min    Veto Camarena MD  Hospitalist  Attending Physician

## 2021-07-28 NOTE — PROGRESS NOTES
FFP administered as ordered. VSS throughout administration. See flowsheets. Pt denies any s/s of reaction. Will continue to closely monitor.     Electronically signed by Sera Pedraza RN on 4/62/4196 at 10:10 PM

## 2021-07-29 ENCOUNTER — APPOINTMENT (OUTPATIENT)
Dept: CT IMAGING | Age: 51
DRG: 720 | End: 2021-07-29
Payer: COMMERCIAL

## 2021-07-29 LAB
A/G RATIO: 1.2 (ref 1.1–2.2)
ALBUMIN SERPL-MCNC: 3.3 G/DL (ref 3.4–5)
ALP BLD-CCNC: 69 U/L (ref 40–129)
ALT SERPL-CCNC: 14 U/L (ref 10–40)
ANION GAP SERPL CALCULATED.3IONS-SCNC: 9 MMOL/L (ref 3–16)
AST SERPL-CCNC: 35 U/L (ref 15–37)
BASOPHILS ABSOLUTE: 0.1 K/UL (ref 0–0.2)
BASOPHILS RELATIVE PERCENT: 0.7 %
BILIRUB SERPL-MCNC: 4 MG/DL (ref 0–1)
BUN BLDV-MCNC: 21 MG/DL (ref 7–20)
CALCIUM SERPL-MCNC: 8.1 MG/DL (ref 8.3–10.6)
CHLORIDE BLD-SCNC: 96 MMOL/L (ref 99–110)
CO2: 35 MMOL/L (ref 21–32)
CREAT SERPL-MCNC: <0.5 MG/DL (ref 0.9–1.3)
EOSINOPHILS ABSOLUTE: 0.1 K/UL (ref 0–0.6)
EOSINOPHILS RELATIVE PERCENT: 1.3 %
GFR AFRICAN AMERICAN: >60
GFR NON-AFRICAN AMERICAN: >60
GLOBULIN: 2.7 G/DL
GLUCOSE BLD-MCNC: 76 MG/DL (ref 70–99)
HCT VFR BLD CALC: 28.4 % (ref 40.5–52.5)
HCV QNT BY NAAT IU/ML: ABNORMAL IU/ML
HCV QNT BY NAAT LOG IU/ML: <1 LOG IU/ML
HEMOGLOBIN: 9.6 G/DL (ref 13.5–17.5)
HEPATITIS B SURFACE ANTIGEN CONFIRMATION: POSITIVE
INR BLD: 1.97 (ref 0.88–1.12)
INTERPRETATION: DETECTED
LACTATE DEHYDROGENASE: 223 U/L (ref 100–190)
LYMPHOCYTES ABSOLUTE: 1.5 K/UL (ref 1–5.1)
LYMPHOCYTES RELATIVE PERCENT: 21 %
MAGNESIUM: 1.6 MG/DL (ref 1.8–2.4)
MCH RBC QN AUTO: 31.9 PG (ref 26–34)
MCHC RBC AUTO-ENTMCNC: 33.8 G/DL (ref 31–36)
MCV RBC AUTO: 94.2 FL (ref 80–100)
MONOCYTES ABSOLUTE: 0.5 K/UL (ref 0–1.3)
MONOCYTES RELATIVE PERCENT: 6.7 %
NEUTROPHILS ABSOLUTE: 5.2 K/UL (ref 1.7–7.7)
NEUTROPHILS RELATIVE PERCENT: 70.3 %
PDW BLD-RTO: 17.6 % (ref 12.4–15.4)
PH, BODY FLUID: 7
PLATELET # BLD: 52 K/UL (ref 135–450)
PMV BLD AUTO: 8.3 FL (ref 5–10.5)
POTASSIUM REFLEX MAGNESIUM: 3.1 MMOL/L (ref 3.5–5.1)
PROTHROMBIN TIME: 22.9 SEC (ref 9.9–12.7)
RBC # BLD: 3.02 M/UL (ref 4.2–5.9)
SODIUM BLD-SCNC: 140 MMOL/L (ref 136–145)
TOTAL PROTEIN: 6 G/DL (ref 6.4–8.2)
WBC # BLD: 7.4 K/UL (ref 4–11)

## 2021-07-29 PROCEDURE — 87449 NOS EACH ORGANISM AG IA: CPT

## 2021-07-29 PROCEDURE — 6370000000 HC RX 637 (ALT 250 FOR IP): Performed by: STUDENT IN AN ORGANIZED HEALTH CARE EDUCATION/TRAINING PROGRAM

## 2021-07-29 PROCEDURE — 94761 N-INVAS EAR/PLS OXIMETRY MLT: CPT

## 2021-07-29 PROCEDURE — 2700000000 HC OXYGEN THERAPY PER DAY

## 2021-07-29 PROCEDURE — 2580000003 HC RX 258: Performed by: STUDENT IN AN ORGANIZED HEALTH CARE EDUCATION/TRAINING PROGRAM

## 2021-07-29 PROCEDURE — 99233 SBSQ HOSP IP/OBS HIGH 50: CPT | Performed by: INTERNAL MEDICINE

## 2021-07-29 PROCEDURE — 6360000002 HC RX W HCPCS: Performed by: STUDENT IN AN ORGANIZED HEALTH CARE EDUCATION/TRAINING PROGRAM

## 2021-07-29 PROCEDURE — 80053 COMPREHEN METABOLIC PANEL: CPT

## 2021-07-29 PROCEDURE — 87040 BLOOD CULTURE FOR BACTERIA: CPT

## 2021-07-29 PROCEDURE — U0003 INFECTIOUS AGENT DETECTION BY NUCLEIC ACID (DNA OR RNA); SEVERE ACUTE RESPIRATORY SYNDROME CORONAVIRUS 2 (SARS-COV-2) (CORONAVIRUS DISEASE [COVID-19]), AMPLIFIED PROBE TECHNIQUE, MAKING USE OF HIGH THROUGHPUT TECHNOLOGIES AS DESCRIBED BY CMS-2020-01-R: HCPCS

## 2021-07-29 PROCEDURE — 36430 TRANSFUSION BLD/BLD COMPNT: CPT

## 2021-07-29 PROCEDURE — U0005 INFEC AGEN DETEC AMPLI PROBE: HCPCS

## 2021-07-29 PROCEDURE — 83615 LACTATE (LD) (LDH) ENZYME: CPT

## 2021-07-29 PROCEDURE — 83735 ASSAY OF MAGNESIUM: CPT

## 2021-07-29 PROCEDURE — 36592 COLLECT BLOOD FROM PICC: CPT

## 2021-07-29 PROCEDURE — 36415 COLL VENOUS BLD VENIPUNCTURE: CPT

## 2021-07-29 PROCEDURE — 6360000002 HC RX W HCPCS: Performed by: INTERNAL MEDICINE

## 2021-07-29 PROCEDURE — 87517 HEPATITIS B DNA QUANT: CPT

## 2021-07-29 PROCEDURE — 85025 COMPLETE CBC W/AUTO DIFF WBC: CPT

## 2021-07-29 PROCEDURE — 6370000000 HC RX 637 (ALT 250 FOR IP): Performed by: INTERNAL MEDICINE

## 2021-07-29 PROCEDURE — 2580000003 HC RX 258: Performed by: INTERNAL MEDICINE

## 2021-07-29 PROCEDURE — 2060000000 HC ICU INTERMEDIATE R&B

## 2021-07-29 PROCEDURE — 94664 DEMO&/EVAL PT USE INHALER: CPT

## 2021-07-29 PROCEDURE — 94640 AIRWAY INHALATION TREATMENT: CPT

## 2021-07-29 PROCEDURE — 85610 PROTHROMBIN TIME: CPT

## 2021-07-29 PROCEDURE — 71250 CT THORAX DX C-: CPT

## 2021-07-29 PROCEDURE — P9047 ALBUMIN (HUMAN), 25%, 50ML: HCPCS | Performed by: INTERNAL MEDICINE

## 2021-07-29 RX ORDER — FUROSEMIDE 10 MG/ML
40 INJECTION INTRAMUSCULAR; INTRAVENOUS DAILY
Status: DISCONTINUED | OUTPATIENT
Start: 2021-07-30 | End: 2021-07-30

## 2021-07-29 RX ORDER — MAGNESIUM SULFATE IN WATER 40 MG/ML
2000 INJECTION, SOLUTION INTRAVENOUS ONCE
Status: COMPLETED | OUTPATIENT
Start: 2021-07-29 | End: 2021-07-29

## 2021-07-29 RX ORDER — ALBUTEROL SULFATE 90 UG/1
2 AEROSOL, METERED RESPIRATORY (INHALATION) 4 TIMES DAILY
Status: DISCONTINUED | OUTPATIENT
Start: 2021-07-29 | End: 2021-08-02 | Stop reason: HOSPADM

## 2021-07-29 RX ORDER — ALBUMIN (HUMAN) 12.5 G/50ML
25 SOLUTION INTRAVENOUS EVERY 8 HOURS
Status: COMPLETED | OUTPATIENT
Start: 2021-07-29 | End: 2021-07-30

## 2021-07-29 RX ADMIN — TRAMADOL HYDROCHLORIDE 50 MG: 50 TABLET, FILM COATED ORAL at 07:43

## 2021-07-29 RX ADMIN — CEFTRIAXONE SODIUM 2000 MG: 2 INJECTION, POWDER, FOR SOLUTION INTRAMUSCULAR; INTRAVENOUS at 13:30

## 2021-07-29 RX ADMIN — ALBUMIN (HUMAN) 25 G: 0.25 INJECTION, SOLUTION INTRAVENOUS at 02:09

## 2021-07-29 RX ADMIN — TRAMADOL HYDROCHLORIDE 50 MG: 50 TABLET, FILM COATED ORAL at 02:08

## 2021-07-29 RX ADMIN — AZITHROMYCIN MONOHYDRATE 250 MG: 500 INJECTION, POWDER, LYOPHILIZED, FOR SOLUTION INTRAVENOUS at 05:20

## 2021-07-29 RX ADMIN — CLONIDINE HYDROCHLORIDE 0.1 MG: 0.1 TABLET ORAL at 18:42

## 2021-07-29 RX ADMIN — ALBUTEROL SULFATE 2 PUFF: 90 AEROSOL, METERED RESPIRATORY (INHALATION) at 15:35

## 2021-07-29 RX ADMIN — MAGNESIUM SULFATE HEPTAHYDRATE 2000 MG: 2 INJECTION, SOLUTION INTRAVENOUS at 08:51

## 2021-07-29 RX ADMIN — CLONIDINE HYDROCHLORIDE 0.1 MG: 0.1 TABLET ORAL at 21:32

## 2021-07-29 RX ADMIN — ALBUTEROL SULFATE 2.5 MG: 2.5 SOLUTION RESPIRATORY (INHALATION) at 09:31

## 2021-07-29 RX ADMIN — CLONIDINE HYDROCHLORIDE 0.1 MG: 0.1 TABLET ORAL at 02:09

## 2021-07-29 RX ADMIN — Medication 10 ML: at 08:53

## 2021-07-29 RX ADMIN — CLONIDINE HYDROCHLORIDE 0.1 MG: 0.1 TABLET ORAL at 07:43

## 2021-07-29 RX ADMIN — ALBUTEROL SULFATE 2.5 MG: 2.5 SOLUTION RESPIRATORY (INHALATION) at 04:40

## 2021-07-29 RX ADMIN — Medication 10 ML: at 21:32

## 2021-07-29 RX ADMIN — ALBUMIN (HUMAN) 25 G: 0.25 INJECTION, SOLUTION INTRAVENOUS at 10:24

## 2021-07-29 RX ADMIN — FUROSEMIDE 40 MG: 10 INJECTION, SOLUTION INTRAMUSCULAR; INTRAVENOUS at 08:52

## 2021-07-29 RX ADMIN — POTASSIUM BICARBONATE 40 MEQ: 782 TABLET, EFFERVESCENT ORAL at 08:52

## 2021-07-29 RX ADMIN — ONDANSETRON 4 MG: 4 TABLET, ORALLY DISINTEGRATING ORAL at 21:32

## 2021-07-29 RX ADMIN — ALBUTEROL SULFATE 2 PUFF: 90 AEROSOL, METERED RESPIRATORY (INHALATION) at 23:10

## 2021-07-29 RX ADMIN — SPIRONOLACTONE 100 MG: 50 TABLET ORAL at 08:52

## 2021-07-29 RX ADMIN — ALBUMIN (HUMAN) 25 G: 0.25 INJECTION, SOLUTION INTRAVENOUS at 18:44

## 2021-07-29 RX ADMIN — FUROSEMIDE 40 MG: 10 INJECTION, SOLUTION INTRAMUSCULAR; INTRAVENOUS at 13:30

## 2021-07-29 ASSESSMENT — PAIN SCALES - GENERAL
PAINLEVEL_OUTOF10: 0
PAINLEVEL_OUTOF10: 8
PAINLEVEL_OUTOF10: 0

## 2021-07-29 ASSESSMENT — PAIN DESCRIPTION - ONSET: ONSET: ON-GOING

## 2021-07-29 ASSESSMENT — ENCOUNTER SYMPTOMS
STRIDOR: 0
CHOKING: 0
ALLERGIC/IMMUNOLOGIC NEGATIVE: 1
NAUSEA: 0
CHEST TIGHTNESS: 0
ABDOMINAL DISTENTION: 0
WHEEZING: 0
ABDOMINAL PAIN: 1
COUGH: 1
VOMITING: 0
EYES NEGATIVE: 1

## 2021-07-29 ASSESSMENT — PAIN DESCRIPTION - FREQUENCY: FREQUENCY: CONTINUOUS

## 2021-07-29 ASSESSMENT — PAIN DESCRIPTION - ORIENTATION: ORIENTATION: MID

## 2021-07-29 ASSESSMENT — PAIN DESCRIPTION - LOCATION: LOCATION: ABDOMEN

## 2021-07-29 ASSESSMENT — PAIN - FUNCTIONAL ASSESSMENT: PAIN_FUNCTIONAL_ASSESSMENT: ACTIVITIES ARE NOT PREVENTED

## 2021-07-29 ASSESSMENT — PAIN DESCRIPTION - DESCRIPTORS: DESCRIPTORS: SHARP;CONSTANT

## 2021-07-29 ASSESSMENT — PAIN DESCRIPTION - PAIN TYPE: TYPE: CHRONIC PAIN

## 2021-07-29 ASSESSMENT — PAIN DESCRIPTION - PROGRESSION: CLINICAL_PROGRESSION: NOT CHANGED

## 2021-07-29 NOTE — PROGRESS NOTES
Nephrology Note                                                                                                                                                                                                                                                                                                                                                               Office : [phone]     Fax :[phone]              Patient's Name: Keshia Pierson  9:40 AM  7/29/2021    Reason for Consult:  Anasarca           Uo much better  Edema better   On lasix        reports that he has been smoking cigarettes. He has been smoking about 1.00 pack per day. He has never used smokeless tobacco. He reports current drug use. Drug: IV. He reports that he does not drink alcohol. Allergies:  Patient has no known allergies.     Current Medications:    magnesium sulfate 2000 mg in 50 mL IVPB premix, Once  albumin human 25 % IV solution 25 g, Q8H  0.9 % sodium chloride infusion, PRN  furosemide (LASIX) injection 40 mg, TID  0.9 % sodium chloride infusion, PRN  0.9 % sodium chloride infusion, PRN  cefTRIAXone (ROCEPHIN) 2000 mg IVPB in D5W 50ml minibag, Q24H  albuterol (PROVENTIL) nebulizer solution 2.5 mg, Q4H PRN  sodium chloride (Inhalant) 3 % nebulizer solution 4 mL, PRN  albuterol (PROVENTIL) nebulizer solution 2.5 mg, 4x daily  sodium chloride flush 0.9 % injection 5-40 mL, 2 times per day  sodium chloride flush 0.9 % injection 5-40 mL, PRN  0.9 % sodium chloride infusion, PRN  ondansetron (ZOFRAN-ODT) disintegrating tablet 4 mg, Q8H PRN   Or  ondansetron (ZOFRAN) injection 4 mg, Q6H PRN  spironolactone (ALDACTONE) tablet 100 mg, Daily  azithromycin (ZITHROMAX) 250 mg in dextrose 5 % 250 mL IVPB, Q24H  sodium chloride flush 0.9 % injection 5-40 mL, 2 times per day  sodium chloride flush 0.9 % injection 5-40 mL, PRN  0.9 % sodium chloride infusion, PRN  traMADol (ULTRAM) tablet 50 mg, PRN   And  cloNIDine (CATAPRES) tablet 0.1 mg, PRN        Review of Systems:   14 point ROS obtained but were negative except mentioned in HPI      Physical exam:     Vitals:  BP (!) 140/80   Pulse 85   Temp 98.6 °F (37 °C) (Oral)   Resp 22   Ht 5' 10\" (1.778 m)   Wt 208 lb 5.4 oz (94.5 kg)   SpO2 96%   BMI 29.89 kg/m²   Constitutional:  AA   Skin: no rash, turgor wnl  Heent:  eomi, mmm  Neck: no bruits or jvd noted  Cardiovascular:  S1, S2 without m/r/g  Respiratory: CTA B without w/r/r  Abdomen:  +bs, soft, nt, nd  Ext: + lower extremity edema  Psychiatric: mood and affect appropriate  Musculoskeletal:  Rom, muscular strength intact    Data:   Labs:  CBC:   Recent Labs     07/27/21  0610 07/28/21  0540 07/29/21  0450   WBC 15.4* 10.8 7.4   HGB 12.2* 10.4* 9.6*   PLT 58* 64* 52*     BMP:    Recent Labs     07/27/21  0610 07/28/21  0540 07/29/21  0450    143 140   K 4.7 3.4* 3.1*    100 96*   CO2 29 36* 35*   BUN 25* 25* 21*   CREATININE 0.5* 0.6* <0.5*   GLUCOSE 88 92 76     Ca/Mg/Phos:   Recent Labs     07/27/21  0610 07/28/21  0540 07/29/21  0450   CALCIUM 8.2* 8.2* 8.1*   MG  --  1.90 1.60*     Hepatic:   Recent Labs     07/27/21  0610 07/28/21  0540 07/29/21  0450   AST 44* 36 35   ALT 21 17 14   BILITOT 3.3* 3.1* 4.0*   ALKPHOS 90 79 69     Troponin:   No results for input(s): TROPONINI in the last 72 hours. BNP: No results for input(s): BNP in the last 72 hours. Lipids: No results for input(s): CHOL, TRIG, HDL, LDLCALC, LABVLDL in the last 72 hours. ABGs: No results for input(s): PHART, PO2ART, [id number] in the last 72 hours. INR:   Recent Labs     07/28/21  1310 07/28/21  2145 07/29/21  0450   INR 1.86* 1.96* 1.97*     UA:  No results for input(s): Evlyn Gaudy, GLUCOSEU, BILIRUBINUR, KETUA, SPECGRAV, BLOODU, PHUR, PROTEINU, UROBILINOGEN, NITRU, LEUKOCYTESUR, Fritz Lipschutz in the last 72 hours. Urine Microscopic:   No results for input(s): LABCAST, BACTERIA, COMU, HYALCAST, WBCUA, RBCUA, EPIU in the last 72 hours.   Urine Final Result      CHEST      1. Large multiloculated right pleural effusion. Recommend diagnostic and therapeutic thoracentesis. 2.  Complete atelectasis of the right upper lobe. Right upper lobe airway secretions. 3.  Compressive atelectasis in the right lower lobe and middle lobe. 4.  Ill-defined nodular opacities in left upper lobe likely infectious or inflammatory but nonspecific.   5.  Mild mediastinal and bilateral supraclavicular lymphadenopathy. ABDOMEN AND PELVIS      1. Cirrhotic liver with stigmata of portal hypertension including splenomegaly, mild ascites and esophageal and gastric varices. 2.  Jessica hepatis, celiac axis and retroperitoneal lymphadenopathy. 3.  Diffusely thickened bowel and stomach may represent sequela of portal hypertension versus infectious/inflammatory. 4.  Anasarca. 5.  Right inguinal hernia containing small bowel without evidence of complication. CT HEAD WO CONTRAST   Final Result      1. No acute intracranial hemorrhage or mass effect. 2.  Sinus disease. XR CHEST (2 VW)   Final Result      Extensive right pleural-parenchymal opacities, which can be seen with pneumonia or malignancy. CT chest with contrast.                Assessment/Plan   1. Cirrhosis     2. Anasarca     3. Anemia    4. Acid- base/ Electrolyte imbalance     5.  Hep C     Plan   - aldactone   - Albumin + lasix  - Ur studies - reviewed   - monitor UO and renal function   - labs in am   - Daily wts   - No NSAIDS   - Paracentesis                 Thank you for allowing us to participate in care of Nicolle Laura MD  Feel free to contact me   [facility] [address]  Office : [phone]  Fax :[phone]

## 2021-07-29 NOTE — PROGRESS NOTES
Pulmonary Followup Note    CC: acute hypoxemic respiratory failure, pleural effusion  Subjective:  Had a thoracentesis yesterday, not a chest tube placement. After procedure patient had worsened dyspnea and had a repeat CT. He complains of shortness of breath this afternoon and is on 12L NC    ROS:  Denies headache, nausea or chest pain. 24HR INTAKE/OUTPUT:      Intake/Output Summary (Last 24 hours) at 2021 1428  Last data filed at 2021 1040  Gross per 24 hour   Intake 870 ml   Output 3100 ml   Net -2230 ml        albumin human  25 g Intravenous Q8H    albuterol sulfate HFA  2 puff Inhalation 4x daily    [START ON 2021] furosemide  40 mg Intravenous Daily    cefTRIAXone (ROCEPHIN) IV  2,000 mg Intravenous Q24H    sodium chloride flush  5-40 mL Intravenous 2 times per day    spironolactone  100 mg Oral Daily    azithromycin  250 mg Intravenous Q24H    sodium chloride flush  5-40 mL Intravenous 2 times per day           PHYSICAL EXAMINATION:  /73   Pulse 74   Temp 98.2 °F (36.8 °C) (Axillary)   Resp 22   Ht 5' 10\" (1.778 m)   Wt 208 lb 5.4 oz (94.5 kg)   SpO2 97%   BMI 29.89 kg/m²   CURRENT PULSE OXIMETRY:  SpO2: 97 %  24HR PULSE OXIMETRY RANGE:  SpO2  Av.9 %  Min: 84 %  Max: 100 % on 12L      Gen: mild distress. Speaking in full sentences with accessory muscle use  HEENT: PERRL, EOMI, OP nl  Lung:  Scattered ronchi with decreased right sided breath sounds  CV: RRR without M/R/R  Abd: +BS, soft, NT/ND  Ext: anasarca.     DATA  CBC:   Recent Labs     21  0610 21  0540 21  0450   WBC 15.4* 10.8 7.4   HGB 12.2* 10.4* 9.6*   HCT 36.2* 30.9* 28.4*   MCV 92.7 94.2 94.2   PLT 58* 64* 52*     BMP:   Recent Labs     21  0610 21  0540 21  0450    143 140   K 4.7 3.4* 3.1*    100 96*   CO2 29 36* 35*   BUN 25* 25* 21*   CREATININE 0.5* 0.6* <0.5*     No results for input(s): PHART, URV2NVY, PO2ART in the last 72 hours. LIVER PROFILE:   Recent Labs     07/27/21  0610 07/28/21  0540 07/29/21  0450   AST 44* 36 35   ALT 21 17 14   BILITOT 3.3* 3.1* 4.0*   ALKPHOS 90 79 69       CXR REVIEWED BY ME AND SHOWED:  CT CHEST WO CONTRAST   Final Result      1. Unchanged large multiloculated right pleural effusion and new small left pleural effusion. 2.  Worsening opacities involving both lungs. Differential considerations include infection, pulmonary edema, pneumonitis, diffuse alveolar damage, and hemorrhage. 3.  Cervical, thoracic, and upper abdominal lymphadenopathy persists. This may be reactive, however metastatic disease and lymphoma can't be excluded. 4.  Unchanged cirrhosis with sequela of portal hypertension. XR CHEST PORTABLE   Final Result      Complete opacification of the right hemithorax. CT GUIDED THORACENTESIS   Final Result      Uneventful CT-guided right thoracentesis      XR CHEST PORTABLE   Final Result      Worsening infiltrates. CT ABDOMEN PELVIS W IV CONTRAST Additional Contrast? None   Final Result      CHEST      1. Large multiloculated right pleural effusion. Recommend diagnostic and therapeutic thoracentesis. 2.  Complete atelectasis of the right upper lobe. Right upper lobe airway secretions. 3.  Compressive atelectasis in the right lower lobe and middle lobe. 4.  Ill-defined nodular opacities in left upper lobe likely infectious or inflammatory but nonspecific.   5.  Mild mediastinal and bilateral supraclavicular lymphadenopathy. ABDOMEN AND PELVIS      1. Cirrhotic liver with stigmata of portal hypertension including splenomegaly, mild ascites and esophageal and gastric varices. 2.  Jessica hepatis, celiac axis and retroperitoneal lymphadenopathy. 3.  Diffusely thickened bowel and stomach may represent sequela of portal hypertension versus infectious/inflammatory. 4.  Anasarca.    5.  Right inguinal hernia containing small bowel without evidence of complication. CT CHEST W CONTRAST   Final Result      CHEST      1. Large multiloculated right pleural effusion. Recommend diagnostic and therapeutic thoracentesis. 2.  Complete atelectasis of the right upper lobe. Right upper lobe airway secretions. 3.  Compressive atelectasis in the right lower lobe and middle lobe. 4.  Ill-defined nodular opacities in left upper lobe likely infectious or inflammatory but nonspecific.   5.  Mild mediastinal and bilateral supraclavicular lymphadenopathy. ABDOMEN AND PELVIS      1. Cirrhotic liver with stigmata of portal hypertension including splenomegaly, mild ascites and esophageal and gastric varices. 2.  Jessica hepatis, celiac axis and retroperitoneal lymphadenopathy. 3.  Diffusely thickened bowel and stomach may represent sequela of portal hypertension versus infectious/inflammatory. 4.  Anasarca. 5.  Right inguinal hernia containing small bowel without evidence of complication. CT HEAD WO CONTRAST   Final Result      1. No acute intracranial hemorrhage or mass effect. 2.  Sinus disease. XR CHEST (2 VW)   Final Result      Extensive right pleural-parenchymal opacities, which can be seen with pneumonia or malignancy. CT chest with contrast.                 ASSESSMENT/PLAN:  This is a 45 y.o. male with strep bacteremia, pleural effusion, diffuse airspace disease and cirrhosis. Pleural effusion:  Incomplete labs as no LDH was checked. However a normal glucose and a protein of 1 are so inconsistent with an exudate that I don't think the LDH is needed. This is a hepatic hydrothorax and the loculations are likely related to a previous history or pulmonary infection/inflammation. Chest xray after procedure suggest worsened effusion but I reviewed the CT and it's actually smaller, but remains loculated.   The big difference is the patchy areas of ground glass scattered throughout the left lung and to a lesser degree the right lung.    No additional drainage is needed at this time, but continued aggressive diuresis may help both the pleural effusion and the ground glass which is likely either pulmonary edema or alveolar hemorrhage. Infection is also possible, but as it doesn't appear like septic emboli I think that is less likely. His INR, anasarca, and imaging that shows esophageal varicies/portal hypertension points to this patient have much worse liver disease than at first impression. The strep bacteremia from fentanyl abuse got him in the hospital, but the advanced liver disease is keeping him here.         Xiao Christianson MD

## 2021-07-29 NOTE — PLAN OF CARE
Problem: Falls - Risk of:  Goal: Will remain free from falls  Description: Will remain free from falls  Outcome: Ongoing   Patient meets [place] [employer] fall risk guidelines. Non skid footwear with ambulation. Bed in lowest position. Call light in reach. Bed alarm activated. Reminded to call for assistance before exiting bed. Continue safety precautions. Problem: Skin Integrity:  Goal: Will show no infection signs and symptoms  Description: Will show no infection signs and symptoms  Outcome: Ongoing   Wound to left buttock. Excoriation to right groin. Scattered bruising and abrasions noted. Problem: Pain:  Goal: Pain level will decrease  Description: Pain level will decrease  Outcome: Ongoing   Patient denies any pain at this time. Problem: Infection:  Goal: Will remain free from infection  Description: Will remain free from infection  Outcome: Ongoing   Patient V.S.S. Afebrile. No new S&S of infection noted. Problem: Cardiac:  Goal: Hemodynamic stability will improve  Description: Hemodynamic stability will improve  Outcome: Ongoing   SR on tele. V.S.S. Oxygen per high flow at 13 liters.

## 2021-07-29 NOTE — PROGRESS NOTES
Patient's I.V. sites sore and oozing, bleeding. I.V.'s removed. Pressure held for bleeding. Bed changed. Bath wipes to clean patient.

## 2021-07-29 NOTE — PROGRESS NOTES
Progress Note    Admit Date: 7/25/2021  Day: ***  Diet: ADULT DIET; Regular; 4 carb choices (60 gm/meal); Low Sodium (2 gm);  Low Potassium (Less than 3000 mg/day)    Interval history: ***    Subjective: ***      Medications:     Scheduled Meds:   furosemide  40 mg Intravenous TID    cefTRIAXone (ROCEPHIN) IV  2,000 mg Intravenous Q24H    albuterol  2.5 mg Nebulization 4x daily    sodium chloride flush  5-40 mL Intravenous 2 times per day    spironolactone  100 mg Oral Daily    azithromycin  250 mg Intravenous Q24H    sodium chloride flush  5-40 mL Intravenous 2 times per day     Continuous Infusions:   sodium chloride      sodium chloride      sodium chloride      sodium chloride      sodium chloride       PRN Meds:sodium chloride, sodium chloride, sodium chloride, albuterol, sodium chloride (Inhalant), sodium chloride flush, sodium chloride, ondansetron **OR** ondansetron, sodium chloride flush, sodium chloride, traMADol **AND** cloNIDine    Objective:   Vitals:   T-max:  Patient Vitals for the past 8 hrs:   BP Temp Temp src Pulse Resp SpO2 Weight   07/29/21 0440     22 97 % 208 lb 5.4 oz (94.5 kg)   07/29/21 0316      97 %    07/29/21 0150     22 95 %    07/28/21 2310 127/76 98 °F (36.7 °C) Oral 82 20 96 %        Intake/Output Summary (Last 24 hours) at 7/29/2021 0636  Last data filed at 7/29/2021 0513  Gross per 24 hour   Intake 630 ml   Output 2350 ml   Net -1720 ml       Physical Exam      LABS:    CBC:   Recent Labs     07/27/21  0610 07/28/21  0540 07/29/21  0450   WBC 15.4* 10.8 7.4   HGB 12.2* 10.4* 9.6*   HCT 36.2* 30.9* 28.4*   PLT 58* 64* 52*   MCV 92.7 94.2 94.2     Renal:    Recent Labs     07/27/21  0610 07/28/21  0540 07/29/21  0450    143 140   K 4.7 3.4* 3.1*    100 96*   CO2 29 36* 35*   BUN 25* 25* 21*   CREATININE 0.5* 0.6* <0.5*   GLUCOSE 88 92 76   CALCIUM 8.2* 8.2* 8.1*   MG  --  1.90 1.60*   ANIONGAP 8 7 9     Hepatic:   Recent Labs     07/27/21  0610 07/28/21  0540 07/29/21  0450   AST 44* 36 35   ALT 21 17 14   BILITOT 3.3* 3.1* 4.0*   PROT 6.2* 6.1* 6.0*   LABALBU 2.9* 3.0* 3.3*   ALKPHOS 90 79 69     Troponin: No results for input(s): TROPONINI in the last 72 hours. BNP: No results for input(s): BNP in the last 72 hours. Lipids: No results for input(s): CHOL, HDL in the last 72 hours. Invalid input(s): LDLCALCU, TRIGLYCERIDE  ABGs:  No results for input(s): PHART, ETE5RHL, PO2ART, ASE4WFC, BEART, THGBART, N5IOJJMJ, GRO7TCY in the last 72 hours. INR:   Recent Labs     07/28/21  1310 07/28/21  2145 07/29/21  0450   INR 1.86* 1.96* 1.97*     Lactate: No results for input(s): LACTATE in the last 72 hours. Cultures:  -----------------------------------------------------------------  RAD:   XR CHEST PORTABLE   Final Result      Complete opacification of the right hemithorax. CT GUIDED THORACENTESIS   Final Result      Uneventful CT-guided right thoracentesis      XR CHEST PORTABLE   Final Result      Worsening infiltrates. CT ABDOMEN PELVIS W IV CONTRAST Additional Contrast? None   Final Result      CHEST      1. Large multiloculated right pleural effusion. Recommend diagnostic and therapeutic thoracentesis. 2.  Complete atelectasis of the right upper lobe. Right upper lobe airway secretions. 3.  Compressive atelectasis in the right lower lobe and middle lobe. 4.  Ill-defined nodular opacities in left upper lobe likely infectious or inflammatory but nonspecific.   5.  Mild mediastinal and bilateral supraclavicular lymphadenopathy. ABDOMEN AND PELVIS      1. Cirrhotic liver with stigmata of portal hypertension including splenomegaly, mild ascites and esophageal and gastric varices. 2.  Jessica hepatis, celiac axis and retroperitoneal lymphadenopathy. 3.  Diffusely thickened bowel and stomach may represent sequela of portal hypertension versus infectious/inflammatory. 4.  Anasarca.    5.  Right inguinal hernia containing small bowel without evidence of complication. CT CHEST W CONTRAST   Final Result      CHEST      1. Large multiloculated right pleural effusion. Recommend diagnostic and therapeutic thoracentesis. 2.  Complete atelectasis of the right upper lobe. Right upper lobe airway secretions. 3.  Compressive atelectasis in the right lower lobe and middle lobe. 4.  Ill-defined nodular opacities in left upper lobe likely infectious or inflammatory but nonspecific.   5.  Mild mediastinal and bilateral supraclavicular lymphadenopathy. ABDOMEN AND PELVIS      1. Cirrhotic liver with stigmata of portal hypertension including splenomegaly, mild ascites and esophageal and gastric varices. 2.  Jessica hepatis, celiac axis and retroperitoneal lymphadenopathy. 3.  Diffusely thickened bowel and stomach may represent sequela of portal hypertension versus infectious/inflammatory. 4.  Anasarca. 5.  Right inguinal hernia containing small bowel without evidence of complication. CT HEAD WO CONTRAST   Final Result      1. No acute intracranial hemorrhage or mass effect. 2.  Sinus disease. XR CHEST (2 VW)   Final Result      Extensive right pleural-parenchymal opacities, which can be seen with pneumonia or malignancy. CT chest with contrast.                  Assessment/Plan:   Patient is a 50M with a PMHx of unconfirmed HCV, IVDU x10 years, and 35 pack-year smoking history who p/w abdominal pain x3 months, SOB x3 months, and new-onset vomiting (x10 episodes/day). He was admitted for decompensated cirrhosis with ascites, sepsis, hematuria, and substance use disorder. Afebrile and HDS.       Loculated pleural effusion 2/2 Strep pneumonia bacteremia  Overnight O2 desaturation, put on 12L non-rebreather to ensure O2 sat >90%    Hxo 1 week of cough productive of green sputum, chronic worsening shortness of breath (x3 months), 35 pack-year smoking history. OA WBC 35.9, tachycardia (106), hypertensive (176/86).   CT Chest 7/25/21 shows large multiloculated right pleural effusion, atelectasis of RUL (complete), RML and RLL (compresison), GEORGINA nodular opacities, mediastinal and BL supraclavicular LAD. TTE 7/27/21 showed Mitral leaflet thickening which could represent a vegetation. LVEF 60-65%, mild mitral regurgitation, mild tricuspid regurgitation.  - Moderate suspicion for infective endocarditis per modified [facility]'s Criteria, HEATHER pending  - Thoracentesis planned by IR to tap the multiloculated effusion seen on CT chest when INR <1.6, per IR. - Blood cultures positive for strep pneumoniae, pending sensitivities  - Continue empiric azithromycin and ceftriaxone for bacteremia and suspected community acquired pneumonia. - Escalate O2 if necessary to maintain O2 sat of >90%. - Infectious disease, nephrology, and pulmonology consulted, rec's appreciated. - Follow-up repeat blood cultures collected on 7/27     Hepatic encephalopathy d/t decompensated cirrhosis with ascites likely 2/2 hepatitis vs. Malignancy  Hxo IVDU x10 years, increasing abdominal girth, says he has diagnosis of HCV, no record. PE shows ascites, asterixis, scleral icterus  CT abd/pelvis w/con 7/25/21 shows cirrhotic liver with portal HTN, splenomegaly, mild ascites, esophageal/gastric varices, portal hepatitis. Retroperitoneal LAD. CT chest w/cont 7/25/21 shows mediastinal and bilateral supraclavicular LAD.   LFTs OA and now are consistent with cirrhosis  - Thoracentesis scheduled for this afternoon with IR if INR <1.6. Not enough fluid for paracentesis per IR.  - Given Vit K 5mg PO and 2 units FFP with goal <1.6  - Repeat INR (1.86) and PT (21.6)  - Albumin 25g IV q8h  - Lasix 40mg IV tid  - Hold spironolactone 100mg IV d/t  resolving hyperkalemia  - Continue rifaxamin and lactulose  - Track strict I/O to evaluate medication efficiency  - Empiric ceftriaxone for SBP ppx  - Consult GI for cirrhosis with out-patient follow-up      Bilateral lower extremity rash 2/2 HCV vs HPV vs. Chronic venous stasis  Hxo rash x3 months. Undocumented hxo HCV (untreated), 10 year IVDU  PE ankle to knee, erythematous, non-tender, wart-like. - HBV and HCV positive  - Monitor for any growth/progression of symptoms     Substance use disorder, IVDU  10 year hxo IVDU fentanyl tid  PE shows tract marks on BL upper extremities  - HIV negative  - HBV and HCV positive 7/27/2021  - COWS protocol  - Social work consulted     Hematuria (Resolved)  Hxo hematuria x 4-5 months, transient, painless, dark red throughout. 35 py smoking history.   UA 7/25/21 shows 21-50 RBC hpf  CT abd/pelvis w/contrast shows no abnormalities in the bladder  - Urine now clear, without evidence of blood  - If hematuria recurs, bladder scan with possible cystoscopy, repeat U/A, consult urology  - Monitor urinal for signs of blood  - Follow up with urology for outpatient cystoscopy     Code Status:  FEN:   PPX:   DISPO:     Ngozi Mejia, MS-4  07/29/21  6:36 AM    This patient has been staffed and discussed with Kem Jameson MD.

## 2021-07-29 NOTE — PROGRESS NOTES
ID Follow-up NOTE  RESIDENT NOTE - reviewed / edited, attending note at bottom    CC:   Pneumococcal bacteremia, R effusion, HCV/ HBV infection  Antibiotics: IV azithromycin and ceftriaxone    Admit Date: 7/25/2021  Hospital Day: 5    Subjective:     Rapid response called yesterday evening because pt was feeling SOB and had thick and blood secretions, had trouble keeping satus to on 15L O2. Was suctioned by RT and patient calmed down. Patient had CT guided thoracentesis 850mL of clear yellowish fluid withdrawn, gram stain showed 1+WBC and no organisms. WBC 7.4. Pulmonology following, pt will need chest tube, concern for hepatic hydrothorax. Pt sitting uncomfortably in bed. He is SOB, still having productive cough. Currently 95% on 12L HFNC. WBC 7.4. No fevers      Objective:     Patient Vitals for the past 8 hrs:   BP Temp Temp src Pulse Resp SpO2 Weight   07/29/21 0730 (!) 140/80 98.6 °F (37 °C) Oral 85 24 95 %    07/29/21 0630      95 %    07/29/21 0440     22 97 % 208 lb 5.4 oz (94.5 kg)   07/29/21 0316 122/70 98.1 °F (36.7 °C) Oral 80 20 97 %    07/29/21 0150     22 95 %      I/O last 3 completed shifts: In: 56 [P.O.:600; I.V.:30]  Out: 2550 [Urine:2300; Emesis/NG output:250]  No intake/output data recorded.     EXAM:  GENERAL:      No apparent distress, uncomfortable  HEENT:           Membranes moist, no oral lesion, PERRL  NECK:             Supple, no lymphadenopathy  LUNGS:           Wheezes, rhonchi, dec breath sounds on the right   CARDIAC:       RRR, no murmur appreciated  ABD:                + BS, distended, TTP  EXT:                Bilateral LE chronic appearing hypertrophic, nodular lesions, bilateral UE tract marks  NEURO:          No focal neurologic findings  PSYCH:           Orientation, sensorium, mood normal  LINES:            central line       Data Review:  Lab Results   Component Value Date    WBC 7.4 07/29/2021    HGB 9.6 (L) 07/29/2021    HCT 28.4 (L) 07/29/2021 MCV 94.2 07/29/2021    PLT 52 (L) 07/29/2021     Lab Results   Component Value Date    CREATININE <0.5 (L) 07/29/2021    BUN 21 (H) 07/29/2021     07/29/2021    K 3.1 (L) 07/29/2021    CL 96 (L) 07/29/2021    CO2 35 (H) 07/29/2021       Hepatic Function Panel:   Lab Results   Component Value Date    ALKPHOS 69 07/29/2021    ALT 14 07/29/2021    AST 35 07/29/2021    PROT 6.0 07/29/2021    BILITOT 4.0 07/29/2021    BILIDIR 1.8 07/25/2021    IBILI 3.3 07/25/2021    LABALBU 3.3 07/29/2021       MICRO:  7/28/2021: Pleural fluid gram stain and cx's: 1+WBCs, no organisms seen    IMAGING:  CT-guided right thoracentesis, therapeutic 7/28/2021  HISTORY: Loculated right pleural effusion     Procedure performed by Dr. Mateo Lozano after explanation of procedure, risks and complications to patient. Recent CT chest 7/25/2021 was reviewed prior to the procedure     Patient placed in a left lateral decubitus position and scans repeated through the chest with redemonstration of the large multiloculated pleural effusion. Up-to-date CT equipment with radiation dose reduction techniques     Skin prepped in sterile manner and local anesthesia administered. Angiocath advanced into one of the larger loculations of fluid via lateral intercostal approach.     850 mL of clear yellowish fluid withdrawn for therapeutic purposes. Samples sent for analysis as ordered. Patient tolerated procedure well. Following the procedure, repeat imaging shows a few tiny air bubbles within the lateral pleural space related to   the procedure.     CT CHEST WO CONTRAST 7/29/2021  1.   Unchanged large multiloculated right pleural effusion and new small left pleural effusion. 2.  Worsening opacities involving both lungs. Differential considerations include infection, pulmonary edema, pneumonitis, diffuse alveolar damage, and hemorrhage. 3.  Cervical, thoracic, and upper abdominal lymphadenopathy persists.  This may be reactive, however metastatic disease and lymphoma can't be excluded. 4.  Unchanged cirrhosis with sequela of portal hypertension. Scheduled Meds:   magnesium sulfate  2,000 mg Intravenous Once    potassium bicarb-citric acid  40 mEq Oral Once    furosemide  40 mg Intravenous TID    cefTRIAXone (ROCEPHIN) IV  2,000 mg Intravenous Q24H    albuterol  2.5 mg Nebulization 4x daily    sodium chloride flush  5-40 mL Intravenous 2 times per day    spironolactone  100 mg Oral Daily    azithromycin  250 mg Intravenous Q24H    sodium chloride flush  5-40 mL Intravenous 2 times per day       Continuous Infusions:   sodium chloride      sodium chloride      sodium chloride      sodium chloride      sodium chloride         PRN Meds:  sodium chloride, sodium chloride, sodium chloride, albuterol, sodium chloride (Inhalant), sodium chloride flush, sodium chloride, ondansetron **OR** ondansetron, sodium chloride flush, sodium chloride, traMADol **AND** cloNIDine      Assessment:     Substance abuse  Cirrhosis - HCV, HBV positive  R pneumonia  R effusion: new small pleural effusion and worsening opacities of both lungs  S pneumoniae bacteremia, pul source    Plan:     -Cont ceftriaxone + azithromycin  -appreciate pulm recs for treatment of loculated pleural effusion, may need chest tube  -cirrhosis per primary team and GI    Discussed with pt, Dr Shubham Manzo MD, PGY-1    Addendum to Resident Progress note:  Pt seen, examined and evaluated. I have reviewed the current history, physical findings, labs and assessment and plan and agree with note as documented by resident (Dr. Orlando Michael).     46 yo man with no established med hx, hx opiate use (fentanyl)     Presents with SOB, cough with 'green' sputum over weeks, worse  Pt with abd pain, RUQ.     Developed nausea / vomiting, reason for coming to ED     In ED 7/25, afeb, WBC 35.9, elevated pro-BNP, alb 2.2, bili 5.1  Imaging with R effusion, lung collapse, cirrhosis with varices  Admit, started on ceftriaxone + azithromycin    7/28 IR thoracentesis 850 ml - transudate (pH 7.0, glu 92, prot 1.0, GS 1+WBC, no organism)    7/29 f/u CT - b/l effusion, new airspace d     IMP/  Substance abuse  Cirrhosis - HCV, HBV positive, + varices, low albumin, elevated INR  R pneumonia  R effusion  S pneumoniae bacteremia, pul source     REC/  Cont ceftriaxone + azithromycin     Thoracentesis / pul catheter drainage - may need multiple catheters, may need intra-pleural thrombolysis  Will review imaging with Radiologist   Sent S pneumo urine ag, HIV screen, procalcitonin    Post-discharge - GI f/u for rx HBV / HCV  Drug abstinence     Medical Decision Making:   The following items were considered in medical decision making:  Discussion of patient care with other providers  Reviewed clinical lab tests  Reviewed radiology tests  Reviewed other diagnostic tests/interventions  Independent review of radiologic images - reviewed CXR, Chest CT   Microbiology cultures and other micro tests reviewed       Discussed with pt, RN, Attila Alvarenga / Justin Ashley MD

## 2021-07-29 NOTE — PROGRESS NOTES
Progress Note    Admit Date: 7/25/2021  Day: 4  Diet: ADULT DIET; Regular; Low Sodium (2 gm); Low Potassium (Less than 3000 mg/day)    Interval history: Rapid response called last night d/t patient feeling short of breath with copious oral thick and bloody secretions, and difficulty keeping saturations >90% on non-rebreather. Patient was suctioned by RT, calmed down, and regained >90% SpO2. Patient is currently on 12L O2 NC, weaned from 15L O2 NC last night. Thoracentesis performed yesterday removed 850mL fluid from the right lung, cultures pending. CT chest this morning showed worsening opacities in both lungs, unchanged right pleural effusion and new small left pleural effusion. Patient's IV sites are oozing and bleeding this AM, IV's were removed. PT 22.9 and INR 1.97 this morning. Patient is AOx4, tired-appearing but in no acute distress, laying comfortably in bed on 12L O2 NC. Patient says his abdominal pain has decreased, and that he is \"feeling better than yesterday\". He states the swelling in his legs has gone down. Afebrile and HDS. DVT ppx held d/t leg rashes, high INR, and bleeding IV sites.       Medications:     Scheduled Meds:   magnesium sulfate  2,000 mg Intravenous Once    potassium bicarb-citric acid  40 mEq Oral Once    furosemide  40 mg Intravenous TID    cefTRIAXone (ROCEPHIN) IV  2,000 mg Intravenous Q24H    albuterol  2.5 mg Nebulization 4x daily    sodium chloride flush  5-40 mL Intravenous 2 times per day    spironolactone  100 mg Oral Daily    azithromycin  250 mg Intravenous Q24H    sodium chloride flush  5-40 mL Intravenous 2 times per day     Continuous Infusions:   sodium chloride      sodium chloride      sodium chloride      sodium chloride      sodium chloride       PRN Meds:sodium chloride, sodium chloride, sodium chloride, albuterol, sodium chloride (Inhalant), sodium chloride flush, sodium chloride, ondansetron **OR** ondansetron, sodium chloride flush, sodium chloride, traMADol **AND** cloNIDine    Objective:   Vitals:   T-max:  Patient Vitals for the past 8 hrs:   BP Temp Temp src Pulse Resp SpO2 Weight   07/29/21 0730 (!) 140/80 98.6 °F (37 °C) Oral 85 24 95 %    07/29/21 0630      95 %    07/29/21 0440     22 97 % 208 lb 5.4 oz (94.5 kg)   07/29/21 0316 122/70 98.1 °F (36.7 °C) Oral 80 20 97 %    07/29/21 0150     22 95 %        Intake/Output Summary (Last 24 hours) at 7/29/2021 0844  Last data filed at 7/29/2021 0646  Gross per 24 hour   Intake 630 ml   Output 2450 ml   Net -1820 ml     Review of Systems   Constitutional: Positive for fatigue. HENT: Negative. Eyes: Negative. Respiratory: Positive for cough (Productive of green sputum). Negative for choking, chest tightness, wheezing and stridor. Cardiovascular: Negative for chest pain, palpitations and leg swelling. Gastrointestinal: Positive for abdominal pain (Decreased, 2/10 on palpation). Negative for abdominal distention, nausea and vomiting. Endocrine: Negative. Genitourinary: Negative. Musculoskeletal: Negative. Skin: Negative. Allergic/Immunologic: Negative. Neurological: Negative for dizziness, tremors, syncope, weakness, light-headedness, numbness and headaches. Hematological: Bruises/bleeds easily (Increased bleeding at IV sites). Psychiatric/Behavioral: Negative. Physical Exam  Constitutional:       Appearance: He is ill-appearing (Improved from yesterday). HENT:      Head: Normocephalic and atraumatic. Mouth/Throat:      Mouth: Mucous membranes are moist.   Eyes:      General: Scleral icterus present. Extraocular Movements: Extraocular movements intact. Pupils: Pupils are equal, round, and reactive to light. Cardiovascular:      Rate and Rhythm: Normal rate and regular rhythm. Heart sounds: Normal heart sounds. No murmur heard. No friction rub. No gallop.        Comments: BL radial pulses 2+  BL dorsalis pedis in the last 72 hours. INR:   Recent Labs     07/28/21  1310 07/28/21  2145 07/29/21  0450   INR 1.86* 1.96* 1.97*     Lactate: No results for input(s): LACTATE in the last 72 hours. Cultures:  -----------------------------------------------------------------  RAD:   CT CHEST WO CONTRAST   Final Result      1. Unchanged large multiloculated right pleural effusion and new small left pleural effusion. 2.  Worsening opacities involving both lungs. Differential considerations include infection, pulmonary edema, pneumonitis, diffuse alveolar damage, and hemorrhage. 3.  Cervical, thoracic, and upper abdominal lymphadenopathy persists. This may be reactive, however metastatic disease and lymphoma can't be excluded. 4.  Unchanged cirrhosis with sequela of portal hypertension. XR CHEST PORTABLE   Final Result      Complete opacification of the right hemithorax. CT GUIDED THORACENTESIS   Final Result      Uneventful CT-guided right thoracentesis      XR CHEST PORTABLE   Final Result      Worsening infiltrates. CT ABDOMEN PELVIS W IV CONTRAST Additional Contrast? None   Final Result      CHEST      1. Large multiloculated right pleural effusion. Recommend diagnostic and therapeutic thoracentesis. 2.  Complete atelectasis of the right upper lobe. Right upper lobe airway secretions. 3.  Compressive atelectasis in the right lower lobe and middle lobe. 4.  Ill-defined nodular opacities in left upper lobe likely infectious or inflammatory but nonspecific.   5.  Mild mediastinal and bilateral supraclavicular lymphadenopathy. ABDOMEN AND PELVIS      1. Cirrhotic liver with stigmata of portal hypertension including splenomegaly, mild ascites and esophageal and gastric varices. 2.  Jessica hepatis, celiac axis and retroperitoneal lymphadenopathy. 3.  Diffusely thickened bowel and stomach may represent sequela of portal hypertension versus infectious/inflammatory. 4.  Anasarca.    5.  Right inguinal hernia containing small bowel without evidence of complication. CT CHEST W CONTRAST   Final Result      CHEST      1. Large multiloculated right pleural effusion. Recommend diagnostic and therapeutic thoracentesis. 2.  Complete atelectasis of the right upper lobe. Right upper lobe airway secretions. 3.  Compressive atelectasis in the right lower lobe and middle lobe. 4.  Ill-defined nodular opacities in left upper lobe likely infectious or inflammatory but nonspecific.   5.  Mild mediastinal and bilateral supraclavicular lymphadenopathy. ABDOMEN AND PELVIS      1. Cirrhotic liver with stigmata of portal hypertension including splenomegaly, mild ascites and esophageal and gastric varices. 2.  Jessica hepatis, celiac axis and retroperitoneal lymphadenopathy. 3.  Diffusely thickened bowel and stomach may represent sequela of portal hypertension versus infectious/inflammatory. 4.  Anasarca. 5.  Right inguinal hernia containing small bowel without evidence of complication. CT HEAD WO CONTRAST   Final Result      1. No acute intracranial hemorrhage or mass effect. 2.  Sinus disease. XR CHEST (2 VW)   Final Result      Extensive right pleural-parenchymal opacities, which can be seen with pneumonia or malignancy. CT chest with contrast.                  Assessment/Plan:   Patient is a 50M with a PMHx of unconfirmed HCV, IVDU x10 years, and 35 pack-year smoking history who p/w abdominal pain x3 months, SOB x3 months, and new-onset vomiting (x10 episodes/day). He was admitted for decompensated cirrhosis with ascites, sepsis, hematuria, and substance use disorder. Afebrile and HDS.     Acute respiratory failure 2/2  pleural effusion likely 2/2 hepatic cirrhosis   Overnight desat on non-rebreather, rapid response called. Regained SpO2 >90%, weaned to 12L NC today.   Hxo 1 week of cough productive of green sputum, chronic worsening shortness of breath (x3 months), 35 pack-year smoking history. OA WBC 35.9, tachycardia (106), hypertensive (176/86). CT Chest 7/25/21 shows large multiloculated right pleural effusion, atelectasis of RUL (complete), RML and RLL (compresison), GEORGINA nodular opacities, mediastinal and BL supraclavicular LAD. TTE 7/27/21 showed Mitral leaflet thickening which could represent a vegetation. LVEF 60-65%, mild mitral regurgitation, mild tricuspid regurgitation.  - Thoracentesis removed 850mL fluid 7/28/21, pending cultures. - Pleural fluid was cloudy, yellow fluid, with protein 1.0, glucose 92, pH 7.0, RBC 6000, and neutrophil 67. Suggestive of transudative etiology. - Repeat CT chest 7/29/21 showed unchanged large multiloculated right pleural effusion and new small left pleural effusion. Worsening opacities involving both lungs. - continue lasix and aldactone   - Blood cultures positive for strep pneumoniae, pending sensitivities  - Follow-up repeat blood cultures   - Continue empiric azithromycin (7/27-) and ceftriaxone (7/28-) for bacteremia and suspected community acquired pneumonia. - Escalate O2 if necessary to maintain O2 sat of >90%. - Infectious disease, nephrology, and pulmonology consulted, rec's appreciated. - r/o COVID     Hepatic cirrhosis 2/2 HBV and HCV infection   Hxo IVDU x10 years, increasing abdominal girth. HCV ag and abd positive, HBsAg positive  HCV RNA pending  CT abd/pelvis w/con 7/25/21 shows cirrhotic liver with portal HTN, splenomegaly, mild ascites, esophageal/gastric varices, portal hepatitis. Retroperitoneal LAD. CT chest w/cont 7/25/21 shows mediastinal and bilateral supraclavicular LAD.   LFTs OA and now are consistent with cirrhosis  - Repeat INR (1.97) and PT (22.9)  - Lasix 40mg IV tid  - Spironolactone 100mg IV   - Continue rifaxamin   - Track strict I/O   - Empiric ceftriaxone (7/28 - ) covering for SBP ppx  - Consult GI for cirrhosis with out-patient follow-up    Strep pneumo bacteremia  - treat as above     Unknown cause of bleeding from IV sites likely 2/2 thrombocytopenia vs. Cirrhotic etiology vs. r/o DIC  This morning patient began to ooze blood from IV sites. IVs removed. INR 1.97, PT 22.9  - Follow-up on fibrinogen  - Continue to trend platelets  - Consider possible platelet transfusion.      Bilateral lower extremity rash 2/2 HCV vs HPV vs. Chronic venous stasis  Hxo rash x3 months. Undocumented hxo HCV (untreated), 10 year IVDU  PE ankle to knee, erythematous, non-tender, wart-like. - HBV and HCV positive  - Monitor for any growth/progression of symptoms     Substance use disorder, IVDU  10 year hxo IVDU fentanyl tid  PE shows tract marks on BL upper extremities  HIV negative. HBV and HCV positive 7/27/2021  - COWS protocol  - Social work consulted     Hematuria (Resolved)  Hxo hematuria x 4-5 months, transient, painless, dark red throughout. 35 py smoking history.   UA 7/25/21 shows 21-50 RBC hpf  CT abd/pelvis w/contrast shows no abnormalities in the bladder  - Urine now clear, without evidence of blood  - If hematuria recurs, bladder scan with possible cystoscopy, repeat U/A, consult urology  - Monitor urinal for signs of blood  - Follow up with urology for outpatient cystoscopy        Code Status: FULL   FEN: Regular diet  PPX: None  DISPO: ALEXEY Montejo MS-4  07/29/21  8:44 AM     Saundra Moncada MD  PGY-1     This patient has been staffed and discussed with Garfield Hughes MD.

## 2021-07-29 NOTE — CARE COORDINATION
Case Management Assessment           Daily Note                 Date/ Time of Note: 7/29/2021 11:37 AM         Note completed by: Lamar Shah RN    Patient Name: Clifford Virgen  YOB: 1970    Diagnosis:Cirrhosis [facility]) [K74.60]  Patient Admission Status: Inpatient    Date of Admission:7/25/2021  8:32 PM Length of Stay: 3 GLOS: GMLOS: 3.28 (AMLOS)    Current Plan of Care: 12L O2, COVID rule out, IV abx, IV lasix  ________________________________________________________________________________________  PT AM-PAC:   / 24 per last evaluation on:     OT AM-PAC:   / 24 per last evaluation on:     DME Needs for discharge: tbd  ________________________________________________________________________________________  Discharge Plan: Home    Tentative discharge date: TBD    Current barriers to discharge: medical clearance      ________________________________________________________________________________________  Case Management Notes:  Patient is from home with roommates, independent pta. Unsure of discharge needs at this time, will continue to follow. Jorge Krishnan and his family were provided with choice of provider; he and his family are in agreement with the discharge plan.     Care Transition Patient: No    Lamar Shah RN  [facility]  Case Management Department  Ph: [phone]  Fax: [phone]

## 2021-07-29 NOTE — PROGRESS NOTES
GI Progress Note      Xiao Prieto is a 45 y.o. male patient. 1. Pleural effusion    2. Hypoxia    3. Hepatitis C virus infection without hepatic coma, unspecified chronicity    4. Cirrhosis of liver with ascites, unspecified hepatic cirrhosis type ([facility].)        Admit Date: 7/25/2021    Subjective:       Events noted. Had rapid response last night after thoracentesis yesterday. Was coughing thick and bloody secretions - denies now. Denies abdominal pain. Thinks abdominal distention improved. Tolerated breakfast. No s/s bleeding.       ROS:  As per above    Scheduled Meds:   magnesium sulfate  2,000 mg Intravenous Once    albumin human  25 g Intravenous Q8H    furosemide  40 mg Intravenous TID    cefTRIAXone (ROCEPHIN) IV  2,000 mg Intravenous Q24H    albuterol  2.5 mg Nebulization 4x daily    sodium chloride flush  5-40 mL Intravenous 2 times per day    spironolactone  100 mg Oral Daily    azithromycin  250 mg Intravenous Q24H    sodium chloride flush  5-40 mL Intravenous 2 times per day       Continuous Infusions:   sodium chloride      sodium chloride      sodium chloride      sodium chloride      sodium chloride         PRN Meds:  sodium chloride, sodium chloride, sodium chloride, albuterol, sodium chloride (Inhalant), sodium chloride flush, sodium chloride, ondansetron **OR** ondansetron, sodium chloride flush, sodium chloride, traMADol **AND** cloNIDine      Objective:       Patient Vitals for the past 24 hrs:   BP Temp Temp src Pulse Resp SpO2 Weight   07/29/21 0932     22 96 %    07/29/21 0730 (!) 140/80 98.6 °F (37 °C) Oral 85 24 95 %    07/29/21 0630      95 %    07/29/21 0440     22 97 % 208 lb 5.4 oz (94.5 kg)   07/29/21 0316 122/70 98.1 °F (36.7 °C) Oral 80 20 97 %    07/29/21 0150     22 95 %    07/28/21 2310 127/76 98 °F (36.7 °C) Oral 82 20 96 %    07/28/21 2220     20 99 %    07/28/21 2137      99 %    07/28/21 2021 132/68 98 °F (36.7 °C) Oral 77 20 100 %    21 1820      (!) 88 %    21 1815      (!) 84 %    21 1730      90 %    21 1605 136/74 97.8 °F (36.6 °C) Oral 76 20 92 %    21 1558 133/78 98.1 °F (36.7 °C) Oral 73 18 97 %    21 1535 121/67 98 °F (36.7 °C) Oral 70 15 96 %    21 1516 123/70 98.4 °F (36.9 °C) Oral 74 16 96 %    21 1511 (!) 154/83 98 °F (36.7 °C)  88 16     21 1510  98 °F (36.7 °C) Oral       21 1308     18 92 %    21 1210 125/73 98.5 °F (36.9 °C) Oral 88 14 94 %    21 1125 118/74 98.1 °F (36.7 °C) Oral 89 15     21 1108 114/73 98 °F (36.7 °C) Oral 86 16 92 %    21 1105 112/72 98 °F (36.7 °C) Oral 87 14     21 1018 115/70 97.7 °F (36.5 °C) Axillary 80 14 92 %    21 1003 115/72 98 °F (36.7 °C) Axillary 85 15 92 %    21 0949 115/73 98.1 °F (36.7 °C) Oral 82 14 95 %        Exam:  VITALS:  BP (!) 140/80   Pulse 85   Temp 98.6 °F (37 °C) (Oral)   Resp 22   Ht 5' 10\" (1.778 m)   Wt 208 lb 5.4 oz (94.5 kg)   SpO2 96%   BMI 29.89 kg/m²   TEMPERATURE:  Current - Temp: 98.6 °F (37 °C);  Max - Temp  Av.1 °F (36.7 °C)  Min: 97.7 °F (36.5 °C)  Max: 98.6 °F (37 °C)      General appearance: alert, appears stated age, cooperative and no distress  Head: Normocephalic, without obvious abnormality, atraumatic  Neck: supple, symmetrical, trachea midline and thyroid not enlarged, symmetric, no tenderness/mass/nodules  CVS:  RRR, Nl s1s2  Abdomen: softer, NT  AAOx3, No asterixis or encephalopathy      Recent Labs     21  0610 07/28/21  0540 21  0450   WBC 15.4* 10.8 7.4   HGB 12.2* 10.4* 9.6*   HCT 36.2* 30.9* 28.4*   MCV 92.7 94.2 94.2   PLT 58* 64* 52*     Recent Labs     21  0610 21  0540 21  0450    143 140   K 4.7 3.4* 3.1*    100 96*   CO2 29 36* 35*   BUN 25* 25* 21*   CREATININE 0.5* 0.6* <0.5*     Recent Labs     21  0610 21  0540 21  0450 AST 44* 36 35   ALT 21 17 14   BILITOT 3.3* 3.1* 4.0*   ALKPHOS 90 79 69     No results for input(s): LIPASE, AMYLASE in the last 72 hours. Recent Labs     07/27/21  0610 07/27/21  0827 07/28/21  0540 07/28/21  0540 07/28/21  1310 07/28/21  2145 07/29/21  0450   PROT 6.2*  --  6.1*  --   --   --  6.0*   INR  --    < > 2.05*   < > 1.86* 1.96* 1.97*    < > = values in this interval not displayed. No results for input(s): PTT in the last 72 hours. No results for input(s): OCCULTBLD in the last 72 hours.       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

## 2021-07-30 LAB
A/G RATIO: 1.5 (ref 1.1–2.2)
ALBUMIN SERPL-MCNC: 3.4 G/DL (ref 3.4–5)
ALP BLD-CCNC: 66 U/L (ref 40–129)
ALT SERPL-CCNC: 14 U/L (ref 10–40)
ANION GAP SERPL CALCULATED.3IONS-SCNC: 6 MMOL/L (ref 3–16)
AST SERPL-CCNC: 35 U/L (ref 15–37)
BASOPHILS ABSOLUTE: 0 K/UL (ref 0–0.2)
BASOPHILS RELATIVE PERCENT: 0.2 %
BILIRUB SERPL-MCNC: 4.3 MG/DL (ref 0–1)
BUN BLDV-MCNC: 16 MG/DL (ref 7–20)
CALCIUM SERPL-MCNC: 8.1 MG/DL (ref 8.3–10.6)
CHLORIDE BLD-SCNC: 95 MMOL/L (ref 99–110)
CO2: 38 MMOL/L (ref 21–32)
CREAT SERPL-MCNC: <0.5 MG/DL (ref 0.9–1.3)
D DIMER: 4595 NG/ML DDU (ref 0–229)
EOSINOPHILS ABSOLUTE: 0.2 K/UL (ref 0–0.6)
EOSINOPHILS RELATIVE PERCENT: 2.5 %
FIBRINOGEN: 44 MG/DL (ref 200–397)
GFR AFRICAN AMERICAN: >60
GFR NON-AFRICAN AMERICAN: >60
GLOBULIN: 2.3 G/DL
GLUCOSE BLD-MCNC: 103 MG/DL (ref 70–99)
HCT VFR BLD CALC: 28.5 % (ref 40.5–52.5)
HEMOGLOBIN: 9.6 G/DL (ref 13.5–17.5)
INR BLD: 2.27 (ref 0.88–1.12)
LYMPHOCYTES ABSOLUTE: 1.9 K/UL (ref 1–5.1)
LYMPHOCYTES RELATIVE PERCENT: 21.1 %
MAGNESIUM: 1.9 MG/DL (ref 1.8–2.4)
MCH RBC QN AUTO: 31.6 PG (ref 26–34)
MCHC RBC AUTO-ENTMCNC: 33.9 G/DL (ref 31–36)
MCV RBC AUTO: 93.2 FL (ref 80–100)
MONOCYTES ABSOLUTE: 0.6 K/UL (ref 0–1.3)
MONOCYTES RELATIVE PERCENT: 6.9 %
NEUTROPHILS ABSOLUTE: 6.2 K/UL (ref 1.7–7.7)
NEUTROPHILS RELATIVE PERCENT: 69.3 %
PDW BLD-RTO: 17.7 % (ref 12.4–15.4)
PLATELET # BLD: 60 K/UL (ref 135–450)
PMV BLD AUTO: 8.1 FL (ref 5–10.5)
POTASSIUM REFLEX MAGNESIUM: 3 MMOL/L (ref 3.5–5.1)
PROCALCITONIN: 0.43 NG/ML (ref 0–0.15)
PROTHROMBIN TIME: 26.5 SEC (ref 9.9–12.7)
RBC # BLD: 3.05 M/UL (ref 4.2–5.9)
SARS-COV-2: NOT DETECTED
SODIUM BLD-SCNC: 139 MMOL/L (ref 136–145)
STREP PNEUMONIAE ANTIGEN, URINE: NORMAL
TOTAL PROTEIN: 5.7 G/DL (ref 6.4–8.2)
WBC # BLD: 8.9 K/UL (ref 4–11)

## 2021-07-30 PROCEDURE — 36592 COLLECT BLOOD FROM PICC: CPT

## 2021-07-30 PROCEDURE — 84145 PROCALCITONIN (PCT): CPT

## 2021-07-30 PROCEDURE — 85384 FIBRINOGEN ACTIVITY: CPT

## 2021-07-30 PROCEDURE — 99233 SBSQ HOSP IP/OBS HIGH 50: CPT | Performed by: INTERNAL MEDICINE

## 2021-07-30 PROCEDURE — 6360000002 HC RX W HCPCS: Performed by: STUDENT IN AN ORGANIZED HEALTH CARE EDUCATION/TRAINING PROGRAM

## 2021-07-30 PROCEDURE — 2580000003 HC RX 258: Performed by: INTERNAL MEDICINE

## 2021-07-30 PROCEDURE — 2700000000 HC OXYGEN THERAPY PER DAY

## 2021-07-30 PROCEDURE — 82105 ALPHA-FETOPROTEIN SERUM: CPT

## 2021-07-30 PROCEDURE — 6370000000 HC RX 637 (ALT 250 FOR IP): Performed by: INTERNAL MEDICINE

## 2021-07-30 PROCEDURE — 80053 COMPREHEN METABOLIC PANEL: CPT

## 2021-07-30 PROCEDURE — 87517 HEPATITIS B DNA QUANT: CPT

## 2021-07-30 PROCEDURE — 2060000000 HC ICU INTERMEDIATE R&B

## 2021-07-30 PROCEDURE — 6370000000 HC RX 637 (ALT 250 FOR IP): Performed by: STUDENT IN AN ORGANIZED HEALTH CARE EDUCATION/TRAINING PROGRAM

## 2021-07-30 PROCEDURE — 6360000002 HC RX W HCPCS: Performed by: INTERNAL MEDICINE

## 2021-07-30 PROCEDURE — 85610 PROTHROMBIN TIME: CPT

## 2021-07-30 PROCEDURE — 85379 FIBRIN DEGRADATION QUANT: CPT

## 2021-07-30 PROCEDURE — 83735 ASSAY OF MAGNESIUM: CPT

## 2021-07-30 PROCEDURE — 85025 COMPLETE CBC W/AUTO DIFF WBC: CPT

## 2021-07-30 PROCEDURE — 2580000003 HC RX 258: Performed by: STUDENT IN AN ORGANIZED HEALTH CARE EDUCATION/TRAINING PROGRAM

## 2021-07-30 PROCEDURE — P9047 ALBUMIN (HUMAN), 25%, 50ML: HCPCS | Performed by: INTERNAL MEDICINE

## 2021-07-30 PROCEDURE — 94640 AIRWAY INHALATION TREATMENT: CPT

## 2021-07-30 PROCEDURE — 94761 N-INVAS EAR/PLS OXIMETRY MLT: CPT

## 2021-07-30 PROCEDURE — 87350 HEPATITIS BE AG IA: CPT

## 2021-07-30 PROCEDURE — 86707 HEPATITIS BE ANTIBODY: CPT

## 2021-07-30 RX ORDER — FUROSEMIDE 10 MG/ML
40 INJECTION INTRAMUSCULAR; INTRAVENOUS 3 TIMES DAILY
Status: DISCONTINUED | OUTPATIENT
Start: 2021-07-30 | End: 2021-08-01

## 2021-07-30 RX ORDER — POTASSIUM CHLORIDE 29.8 MG/ML
20 INJECTION INTRAVENOUS
Status: COMPLETED | OUTPATIENT
Start: 2021-07-30 | End: 2021-07-30

## 2021-07-30 RX ORDER — DIAPER,BRIEF,INFANT-TODD,DISP
EACH MISCELLANEOUS 2 TIMES DAILY
Status: DISCONTINUED | OUTPATIENT
Start: 2021-07-30 | End: 2021-08-02 | Stop reason: HOSPADM

## 2021-07-30 RX ORDER — SPIRONOLACTONE 50 MG/1
100 TABLET, FILM COATED ORAL ONCE
Status: COMPLETED | OUTPATIENT
Start: 2021-07-30 | End: 2021-07-30

## 2021-07-30 RX ORDER — POTASSIUM CHLORIDE 20 MEQ/1
20 TABLET, EXTENDED RELEASE ORAL ONCE
Status: COMPLETED | OUTPATIENT
Start: 2021-07-30 | End: 2021-07-30

## 2021-07-30 RX ADMIN — FUROSEMIDE 40 MG: 10 INJECTION, SOLUTION INTRAMUSCULAR; INTRAVENOUS at 20:18

## 2021-07-30 RX ADMIN — CLONIDINE HYDROCHLORIDE 0.1 MG: 0.1 TABLET ORAL at 01:18

## 2021-07-30 RX ADMIN — CLONIDINE HYDROCHLORIDE 0.1 MG: 0.1 TABLET ORAL at 10:10

## 2021-07-30 RX ADMIN — ALBUTEROL SULFATE 2 PUFF: 90 AEROSOL, METERED RESPIRATORY (INHALATION) at 20:52

## 2021-07-30 RX ADMIN — Medication 10 ML: at 20:18

## 2021-07-30 RX ADMIN — ALBUTEROL SULFATE 2 PUFF: 90 AEROSOL, METERED RESPIRATORY (INHALATION) at 16:09

## 2021-07-30 RX ADMIN — CLONIDINE HYDROCHLORIDE 0.1 MG: 0.1 TABLET ORAL at 13:21

## 2021-07-30 RX ADMIN — CEFTRIAXONE SODIUM 2000 MG: 2 INJECTION, POWDER, FOR SOLUTION INTRAMUSCULAR; INTRAVENOUS at 13:21

## 2021-07-30 RX ADMIN — POTASSIUM CHLORIDE 20 MEQ: 29.8 INJECTION, SOLUTION INTRAVENOUS at 08:57

## 2021-07-30 RX ADMIN — CLONIDINE HYDROCHLORIDE 0.1 MG: 0.1 TABLET ORAL at 04:57

## 2021-07-30 RX ADMIN — FUROSEMIDE 40 MG: 10 INJECTION, SOLUTION INTRAMUSCULAR; INTRAVENOUS at 13:21

## 2021-07-30 RX ADMIN — CLONIDINE HYDROCHLORIDE 0.1 MG: 0.1 TABLET ORAL at 23:15

## 2021-07-30 RX ADMIN — ONDANSETRON 4 MG: 4 TABLET, ORALLY DISINTEGRATING ORAL at 23:15

## 2021-07-30 RX ADMIN — ALBUTEROL SULFATE 2 PUFF: 90 AEROSOL, METERED RESPIRATORY (INHALATION) at 12:41

## 2021-07-30 RX ADMIN — ONDANSETRON 4 MG: 2 INJECTION INTRAMUSCULAR; INTRAVENOUS at 10:10

## 2021-07-30 RX ADMIN — POTASSIUM CHLORIDE 20 MEQ: 1500 TABLET, EXTENDED RELEASE ORAL at 10:10

## 2021-07-30 RX ADMIN — ALBUMIN (HUMAN) 25 G: 0.25 INJECTION, SOLUTION INTRAVENOUS at 01:19

## 2021-07-30 RX ADMIN — POTASSIUM CHLORIDE 20 MEQ: 29.8 INJECTION, SOLUTION INTRAVENOUS at 08:08

## 2021-07-30 RX ADMIN — ALBUTEROL SULFATE 2 PUFF: 90 AEROSOL, METERED RESPIRATORY (INHALATION) at 08:41

## 2021-07-30 RX ADMIN — SPIRONOLACTONE 100 MG: 50 TABLET ORAL at 18:41

## 2021-07-30 RX ADMIN — HYDROCORTISONE: 1 OINTMENT TOPICAL at 20:18

## 2021-07-30 ASSESSMENT — PAIN DESCRIPTION - PROGRESSION: CLINICAL_PROGRESSION: NOT CHANGED

## 2021-07-30 ASSESSMENT — ENCOUNTER SYMPTOMS
ABDOMINAL DISTENTION: 1
COUGH: 1
WHEEZING: 1
CHEST TIGHTNESS: 1
NAUSEA: 1
SHORTNESS OF BREATH: 1
ABDOMINAL PAIN: 1

## 2021-07-30 ASSESSMENT — PAIN DESCRIPTION - FREQUENCY: FREQUENCY: INTERMITTENT

## 2021-07-30 ASSESSMENT — PAIN DESCRIPTION - ORIENTATION: ORIENTATION: MID

## 2021-07-30 ASSESSMENT — PAIN SCALES - GENERAL
PAINLEVEL_OUTOF10: 0
PAINLEVEL_OUTOF10: 8

## 2021-07-30 ASSESSMENT — PAIN DESCRIPTION - DESCRIPTORS: DESCRIPTORS: SHARP;CONSTANT

## 2021-07-30 ASSESSMENT — PAIN DESCRIPTION - ONSET: ONSET: ON-GOING

## 2021-07-30 ASSESSMENT — PAIN DESCRIPTION - LOCATION: LOCATION: ABDOMEN

## 2021-07-30 ASSESSMENT — PAIN - FUNCTIONAL ASSESSMENT: PAIN_FUNCTIONAL_ASSESSMENT: ACTIVITIES ARE NOT PREVENTED

## 2021-07-30 ASSESSMENT — PAIN DESCRIPTION - PAIN TYPE: TYPE: CHRONIC PAIN

## 2021-07-30 NOTE — PROGRESS NOTES
Pulmonary Followup Note    CC: acute hypoxemic respiratory failure, pleural effusion  Subjective:  Remains on 12L NC. Says his breathing still isn't very good. He had to get several units of FFP to slow the bleeding from his central line. Still with hemoptysis. ROS:  Denies headache, nausea or chest pain. 24HR INTAKE/OUTPUT:      Intake/Output Summary (Last 24 hours) at 2021 1008  Last data filed at 2021 0426  Gross per 24 hour   Intake 5577.33 ml   Output 1500 ml   Net 4077.33 ml        potassium chloride  20 mEq Oral Once    furosemide  40 mg Intravenous TID    albuterol sulfate HFA  2 puff Inhalation 4x daily    cefTRIAXone (ROCEPHIN) IV  2,000 mg Intravenous Q24H    sodium chloride flush  5-40 mL Intravenous 2 times per day    spironolactone  100 mg Oral Daily    sodium chloride flush  5-40 mL Intravenous 2 times per day           PHYSICAL EXAMINATION:  /68   Pulse 68   Temp 97.8 °F (36.6 °C) (Axillary)   Resp 24   Ht 5' 10\" (1.778 m)   Wt 199 lb 1.2 oz (90.3 kg)   SpO2 95%   BMI 28.56 kg/m²   CURRENT PULSE OXIMETRY:  SpO2: 95 %  24HR PULSE OXIMETRY RANGE:  SpO2  Av.3 %  Min: 90 %  Max: 97 % on 12L      Gen: mild distress. Speaking in full sentences with accessory muscle use  HEENT: PERRL, EOMI, OP nl  Lung:  Scattered ronchi with decreased right sided breath sounds  CV: RRR without M/R/R  Abd: +BS, soft, NT/ND  Ext: anasarca. DATA  CBC:   Recent Labs     21  0540 21  0450 21  0500   WBC 10.8 7.4 8.9   HGB 10.4* 9.6* 9.6*   HCT 30.9* 28.4* 28.5*   MCV 94.2 94.2 93.2   PLT 64* 52* 60*     BMP:   Recent Labs     21  0540 21  0450 21  0500    140 139   K 3.4* 3.1* 3.0*    96* 95*   CO2 36* 35* 38*   BUN 25* 21* 16   CREATININE 0.6* <0.5* <0.5*     No results for input(s): PHART, VCX1MGN, PO2ART in the last 72 hours.   LIVER PROFILE:   Recent Labs     21  0540 21  0450 07/30/21  0500   AST 36 35 35   ALT 17 14 14   BILITOT 3.1* 4.0* 4.3*   ALKPHOS 79 69 66       CXR REVIEWED BY ME AND SHOWED:  CT CHEST WO CONTRAST   Final Result      1. Unchanged large multiloculated right pleural effusion and new small left pleural effusion. 2.  Worsening opacities involving both lungs. Differential considerations include infection, pulmonary edema, pneumonitis, diffuse alveolar damage, and hemorrhage. 3.  Cervical, thoracic, and upper abdominal lymphadenopathy persists. This may be reactive, however metastatic disease and lymphoma can't be excluded. 4.  Unchanged cirrhosis with sequela of portal hypertension. XR CHEST PORTABLE   Final Result      Complete opacification of the right hemithorax. CT GUIDED THORACENTESIS   Final Result      Uneventful CT-guided right thoracentesis      XR CHEST PORTABLE   Final Result      Worsening infiltrates. CT ABDOMEN PELVIS W IV CONTRAST Additional Contrast? None   Final Result      CHEST      1. Large multiloculated right pleural effusion. Recommend diagnostic and therapeutic thoracentesis. 2.  Complete atelectasis of the right upper lobe. Right upper lobe airway secretions. 3.  Compressive atelectasis in the right lower lobe and middle lobe. 4.  Ill-defined nodular opacities in left upper lobe likely infectious or inflammatory but nonspecific.   5.  Mild mediastinal and bilateral supraclavicular lymphadenopathy. ABDOMEN AND PELVIS      1. Cirrhotic liver with stigmata of portal hypertension including splenomegaly, mild ascites and esophageal and gastric varices. 2.  Jessica hepatis, celiac axis and retroperitoneal lymphadenopathy. 3.  Diffusely thickened bowel and stomach may represent sequela of portal hypertension versus infectious/inflammatory. 4.  Anasarca. 5.  Right inguinal hernia containing small bowel without evidence of complication. CT CHEST W CONTRAST   Final Result      CHEST      1.   Large multiloculated right pleural effusion. Recommend diagnostic and therapeutic thoracentesis. 2.  Complete atelectasis of the right upper lobe. Right upper lobe airway secretions. 3.  Compressive atelectasis in the right lower lobe and middle lobe. 4.  Ill-defined nodular opacities in left upper lobe likely infectious or inflammatory but nonspecific.   5.  Mild mediastinal and bilateral supraclavicular lymphadenopathy. ABDOMEN AND PELVIS      1. Cirrhotic liver with stigmata of portal hypertension including splenomegaly, mild ascites and esophageal and gastric varices. 2.  Jessica hepatis, celiac axis and retroperitoneal lymphadenopathy. 3.  Diffusely thickened bowel and stomach may represent sequela of portal hypertension versus infectious/inflammatory. 4.  Anasarca. 5.  Right inguinal hernia containing small bowel without evidence of complication. CT HEAD WO CONTRAST   Final Result      1. No acute intracranial hemorrhage or mass effect. 2.  Sinus disease. XR CHEST (2 VW)   Final Result      Extensive right pleural-parenchymal opacities, which can be seen with pneumonia or malignancy. CT chest with contrast.                 ASSESSMENT/PLAN:  This is a 45 y.o. male with strep bacteremia, pleural effusion, diffuse airspace disease and cirrhosis. Pleural effusion:  Fits best with a hepatic hydrothorax and the loculations likely are related to previous adhesions. No additional drainage is needed at this time, but continue aggressive diuresis. His INR, anasarca, and imaging shows esophageal varicies/portal hypertension which points to this patient advanced liver disease. The last 24 hours his uop was good, but his net balance was even because of the blood products he needed. I would continue to try to diurese him. Any attempts at a repeat thora would likely require too much blood product to make it worth draining the fluid.       Kristopher Jimenez MD

## 2021-07-30 NOTE — PROGRESS NOTES
1.2 oz (90.3 kg)   SpO2 95%   BMI 28.56 kg/m²   Constitutional:  AA   Skin: no rash, turgor wnl  Heent:  eomi, mmm  Neck: no bruits or jvd noted  Cardiovascular:  S1, S2 without m/r/g  Respiratory: CTA B without w/r/r  Abdomen:  +bs, soft, nt, nd  Ext: + lower extremity edema  Psychiatric: mood and affect appropriate  Musculoskeletal:  Rom, muscular strength intact    Data:   Labs:  CBC:   Recent Labs     07/28/21  0540 07/29/21  0450 07/30/21  0500   WBC 10.8 7.4 8.9   HGB 10.4* 9.6* 9.6*   PLT 64* 52* 60*     BMP:    Recent Labs     07/28/21  0540 07/29/21  0450 07/30/21  0500    140 139   K 3.4* 3.1* 3.0*    96* 95*   CO2 36* 35* 38*   BUN 25* 21* 16   CREATININE 0.6* <0.5* <0.5*   GLUCOSE 92 76 103*     Ca/Mg/Phos:   Recent Labs     07/28/21  0540 07/29/21  0450 07/30/21  0500   CALCIUM 8.2* 8.1* 8.1*   MG 1.90 1.60* 1.90     Hepatic:   Recent Labs     07/28/21  0540 07/29/21  0450 07/30/21  0500   AST 36 35 35   ALT 17 14 14   BILITOT 3.1* 4.0* 4.3*   ALKPHOS 79 69 66     Troponin:   No results for input(s): TROPONINI in the last 72 hours. BNP: No results for input(s): BNP in the last 72 hours. Lipids: No results for input(s): CHOL, TRIG, HDL, LDLCALC, LABVLDL in the last 72 hours. ABGs: No results for input(s): PHART, PO2ART, EMT2ULL in the last 72 hours. INR:   Recent Labs     07/28/21  2145 07/29/21  0450 07/30/21  0500   INR 1.96* 1.97* 2.27*     UA:  No results for input(s): [place], GLUCOSEU, BILIRUBINUR, KETUA, SPECGRAV, BLOODU, PHUR, PROTEINU, UROBILINOGEN, NITRU, LEUKOCYTESUR, [place] Heckle in the last 72 hours. Urine Microscopic:   No results for input(s): LABCAST, BACTERIA, COMU, HYALCAST, WBCUA, RBCUA, EPIU in the last 72 hours. Urine Culture: No results for input(s): LABURIN in the last 72 hours. Urine Chemistry:   No results for input(s): [place], PROTEINUR, NAUR in the last 72 hours. IMAGING:  CT CHEST WO CONTRAST   Final Result      1. Unchanged large multiloculated right pleural effusion and new small left pleural effusion. 2.  Worsening opacities involving both lungs. Differential considerations include infection, pulmonary edema, pneumonitis, diffuse alveolar damage, and hemorrhage. 3.  Cervical, thoracic, and upper abdominal lymphadenopathy persists. This may be reactive, however metastatic disease and lymphoma can't be excluded. 4.  Unchanged cirrhosis with sequela of portal hypertension. XR CHEST PORTABLE   Final Result      Complete opacification of the right hemithorax. CT GUIDED THORACENTESIS   Final Result      Uneventful CT-guided right thoracentesis      XR CHEST PORTABLE   Final Result      Worsening infiltrates. CT ABDOMEN PELVIS W IV CONTRAST Additional Contrast? None   Final Result      CHEST      1. Large multiloculated right pleural effusion. Recommend diagnostic and therapeutic thoracentesis. 2.  Complete atelectasis of the right upper lobe. Right upper lobe airway secretions. 3.  Compressive atelectasis in the right lower lobe and middle lobe. 4.  Ill-defined nodular opacities in left upper lobe likely infectious or inflammatory but nonspecific.   5.  Mild mediastinal and bilateral supraclavicular lymphadenopathy. ABDOMEN AND PELVIS      1. Cirrhotic liver with stigmata of portal hypertension including splenomegaly, mild ascites and esophageal and gastric varices. 2.  Jessica hepatis, celiac axis and retroperitoneal lymphadenopathy. 3.  Diffusely thickened bowel and stomach may represent sequela of portal hypertension versus infectious/inflammatory. 4.  Anasarca. 5.  Right inguinal hernia containing small bowel without evidence of complication. CT CHEST W CONTRAST   Final Result      CHEST      1. Large multiloculated right pleural effusion. Recommend diagnostic and therapeutic thoracentesis. 2.  Complete atelectasis of the right upper lobe.  Right upper lobe airway secretions. 3.  Compressive atelectasis in the right lower lobe and middle lobe. 4.  Ill-defined nodular opacities in left upper lobe likely infectious or inflammatory but nonspecific.   5.  Mild mediastinal and bilateral supraclavicular lymphadenopathy. ABDOMEN AND PELVIS      1. Cirrhotic liver with stigmata of portal hypertension including splenomegaly, mild ascites and esophageal and gastric varices. 2.  Jessica hepatis, celiac axis and retroperitoneal lymphadenopathy. 3.  Diffusely thickened bowel and stomach may represent sequela of portal hypertension versus infectious/inflammatory. 4.  Anasarca. 5.  Right inguinal hernia containing small bowel without evidence of complication. CT HEAD WO CONTRAST   Final Result      1. No acute intracranial hemorrhage or mass effect. 2.  Sinus disease. XR CHEST (2 VW)   Final Result      Extensive right pleural-parenchymal opacities, which can be seen with pneumonia or malignancy. CT chest with contrast.                Assessment/Plan   1. Cirrhosis     2. Anasarca     3. Anemia    4. Acid- base/ Electrolyte imbalance     5.  Hep C     Plan   - aldactone   - Albumin + lasix  - Ur studies - reviewed   - monitor UO and renal function   - labs in am   - Daily wts   - No NSAIDS   - Paracentesis                 Thank you for allowing us to participate in care of Silvio Pavon MD  Feel free to contact me   [facility] [address]  Office : [phone]  Fax :[phone]

## 2021-07-30 NOTE — PLAN OF CARE
Problem: Falls - Risk of:  Goal: Will remain free from falls  Description: Will remain free from falls  Outcome: Ongoing     Problem: Skin Integrity:  Goal: Will show no infection signs and symptoms  Description: Will show no infection signs and symptoms  Outcome: Ongoing     Problem: Pain:  Goal: Pain level will decrease  Description: Pain level will decrease  Outcome: Ongoing     Problem: Cardiac:  Goal: Hemodynamic stability will improve  Description: Hemodynamic stability will improve  Outcome: Ongoing  Note:   Vitals:    07/29/21 2310 07/29/21 2330 07/30/21 0155 07/30/21 0225   BP:  118/76     Pulse:  83     Resp: 24 22 22    Temp:  98.9 °F (37.2 °C)     TempSrc:  Oral     SpO2: 92% 91% 94% 93%   Weight:       Height:

## 2021-07-30 NOTE — PROGRESS NOTES
GI Progress Note      Linda Conti is a 45 y.o. male patient. 1. Pleural effusion    2. Hypoxia    3. Hepatitis C virus infection without hepatic coma, unspecified chronicity    4. Cirrhosis of liver with ascites, unspecified hepatic cirrhosis [facility]        Admit Date: 2021    Subjective:       Still SOB on 12L. Had bleeding from central line. Denies rectal bleeding or black stool. Denies abdominal pain.        ROS:  As per above    Scheduled Meds:   furosemide  40 mg Intravenous TID    hydrocortisone   Topical BID    spironolactone  100 mg Oral Once    albuterol sulfate HFA  2 puff Inhalation 4x daily    cefTRIAXone (ROCEPHIN) IV  2,000 mg Intravenous Q24H    sodium chloride flush  5-40 mL Intravenous 2 times per day    spironolactone  100 mg Oral Daily    sodium chloride flush  5-40 mL Intravenous 2 times per day       Continuous Infusions:   sodium chloride      sodium chloride         PRN Meds:  albuterol, sodium chloride (Inhalant), sodium chloride flush, sodium chloride, ondansetron **OR** ondansetron, sodium chloride flush, sodium chloride, [] traMADol **AND** cloNIDine      Objective:       Patient Vitals for the past 24 hrs:   BP Temp Temp src Pulse Resp SpO2 Weight   21 0842     24 95 %    21 0806 118/68 97.8 °F (36.6 °C) Axillary 68 24 95 %    21 0612      90 %    21 0540     20 93 %    21 0426 130/81 98.8 °F (37.1 °C) Oral 77 22 93 % 199 lb 1.2 oz (90.3 kg)   21 0225      93 %    21 0155     22 94 %    21 2330 118/76 98.9 °F (37.2 °C) Oral 83 22 91 %    21 2310     24 92 %    21 2202      91 %    21 2029 124/80 98.2 °F (36.8 °C) Oral 88 22 93 %    21 1627 132/77 98.8 °F (37.1 °C) Oral 79 22 95 %    21 1537     24 94 %    21 1141 120/73 98.2 °F (36.8 °C) Axillary 74 22 97 %        Exam:  VITALS:  /68   Pulse 68   Temp 97.8 °F (36.6 °C) (Axillary)   Resp 24   Ht 5' 10\" (1.778 m)   Wt 199 lb 1.2 oz (90.3 kg)   SpO2 95%   BMI 28.56 kg/m²   TEMPERATURE:  Current - Temp: 97.8 °F (36.6 °C); Max - Temp  Av.5 °F (36.9 °C)  Min: 97.8 °F (36.6 °C)  Max: 98.9 °F (37.2 °C)      General appearance: alert, appears stated age, cooperative, somewhat tachypneic  Head: Normocephalic, without obvious abnormality, atraumatic  Neck: supple, symmetrical, trachea midline and thyroid not enlarged, symmetric, no tenderness/mass/nodules  CVS:  RRR, Nl s1s2  Lungs decreased BS on right  Abdomen: soft, NT, mild distention  AAOx3, No asterixis or encephalopathy      Recent Labs     21  0540 21  0450 21  0500   WBC 10.8 7.4 8.9   HGB 10.4* 9.6* 9.6*   HCT 30.9* 28.4* 28.5*   MCV 94.2 94.2 93.2   PLT 64* 52* 60*     Recent Labs     21  0540 21  0450 21  0500    140 139   K 3.4* 3.1* 3.0*    96* 95*   CO2 36* 35* 38*   BUN 25* 21* 16   CREATININE 0.6* <0.5* <0.5*     Recent Labs     21  0540 21  0450 21  0500   AST 36 35 35   ALT 17 14 14   BILITOT 3.1* 4.0* 4.3*   ALKPHOS 79 69 66     No results for input(s): LIPASE, AMYLASE in the last 72 hours. Recent Labs     21  0540 21  1310 21  2145 21  0450 21  0500   PROT 6.1*  --   --  6.0* 5.7*   INR 2.05*   < > 1.96* 1.97* 2.27*    < > = values in this interval not displayed. No results for input(s): PTT in the last 72 hours. No results for input(s): OCCULTBLD in the last 72 hours.   Pleural fluid c/w hepatic hydrothorax  HepBsAb (+)  HepBsAg reactive  HepCAb (+)  HepBcore (+)  HCV quant < 10 IU/ mL    Assessment:     Decompensated cirrhosis  Anasarca  Hepatic hydrothorax  Hypoxia  Strep bacteremia      Recommendations:     Nephrology managing diuresis  Check hep B DNA, AFP  Will need outpatient follow up with Dr Hiram De León for hepatitis management and ROYER Torres MD  2021  11:31 AM

## 2021-07-30 NOTE — PROGRESS NOTES
Progress Note    Admit Date: 2021  Day: 4  Diet: ADULT DIET; Regular; Low Sodium (2 gm)    CC: nausea and vomiting     Interval history: pt seen and examined at bedside. Pt reports feeling the same as before, appears ill. Endorses weakness and fatigue. Reports his abdominal pain in better than before. Vitas: afebrile,  /68, hr 68, rr 24, SpO2 95% 12 L       Medications:     Scheduled Meds:   potassium chloride  20 mEq Intravenous Q1H    potassium chloride  20 mEq Oral Once    albuterol sulfate HFA  2 puff Inhalation 4x daily    furosemide  40 mg Intravenous Daily    cefTRIAXone (ROCEPHIN) IV  2,000 mg Intravenous Q24H    sodium chloride flush  5-40 mL Intravenous 2 times per day    spironolactone  100 mg Oral Daily    sodium chloride flush  5-40 mL Intravenous 2 times per day     Continuous Infusions:   sodium chloride      sodium chloride       PRN Meds:albuterol, sodium chloride (Inhalant), sodium chloride flush, sodium chloride, ondansetron **OR** ondansetron, sodium chloride flush, sodium chloride, [] traMADol **AND** cloNIDine    Objective:   Vitals:   T-max:  Patient Vitals for the past 8 hrs:   BP Temp Temp src Pulse Resp SpO2 Weight   21 0806 118/68 97.8 °F (36.6 °C) Axillary 68 24 95 %    21 0612      90 %    21 0540     20 93 %    21 0426 130/81 98.8 °F (37.1 °C) Oral 77 22 93 % 199 lb 1.2 oz (90.3 kg)   21 0225      93 %    21 0155     22 94 %        Intake/Output Summary (Last 24 hours) at 2021 0834  Last data filed at 2021 0426  Gross per 24 hour   Intake 5817.33 ml   Output 2450 ml   Net 3367.33 ml       Review of Systems   Constitutional: Positive for activity change, chills and fatigue. Respiratory: Positive for cough, chest tightness, shortness of breath and wheezing. Gastrointestinal: Positive for abdominal distention, abdominal pain and nausea.    Musculoskeletal: Positive for arthralgias and myalgias. Skin: Positive for rash. Physical Exam  Constitutional:       Comments: Ill -appearing    HENT:      Head: Normocephalic and atraumatic. Nose: Nose normal.      Mouth/Throat:      Mouth: Mucous membranes are dry. Eyes:      Extraocular Movements: Extraocular movements intact. Conjunctiva/sclera: Conjunctivae normal.      Pupils: Pupils are equal, round, and reactive to light. Cardiovascular:      Rate and Rhythm: Normal rate and regular rhythm. Pulses: Normal pulses. Heart sounds: Normal heart sounds. Pulmonary:      Effort: Respiratory distress present. Breath sounds: Wheezing and rales present. Comments: On 12 L NC. Congested. B/l diffuse crackles   Abdominal:      General: There is distension. Tenderness: There is abdominal tenderness. Musculoskeletal:      Cervical back: Normal range of motion and neck supple. Comments: B/l lower extremities swelling and rash- stable    Neurological:      Mental Status: He is oriented to person, place, and time. Psychiatric:         Mood and Affect: Mood normal.         Behavior: Behavior normal.         Thought Content: Thought content normal.         Judgment: Judgment normal.         LABS:    CBC:   Recent Labs     07/28/21  0540 07/29/21  0450 07/30/21  0500   WBC 10.8 7.4 8.9   HGB 10.4* 9.6* 9.6*   HCT 30.9* 28.4* 28.5*   PLT 64* 52* 60*   MCV 94.2 94.2 93.2     Renal:    Recent Labs     07/28/21  0540 07/29/21  0450 07/30/21  0500    140 139   K 3.4* 3.1* 3.0*    96* 95*   CO2 36* 35* 38*   BUN 25* 21* 16   CREATININE 0.6* <0.5* <0.5*   GLUCOSE 92 76 103*   CALCIUM 8.2* 8.1* 8.1*   MG 1.90 1.60* 1.90   ANIONGAP 7 9 6     Hepatic:   Recent Labs     07/28/21  0540 07/29/21  0450 07/30/21  0500   AST 36 35 35   ALT 17 14 14   BILITOT 3.1* 4.0* 4.3*   PROT 6.1* 6.0* 5.7*   LABALBU 3.0* 3.3* 3.4   ALKPHOS 79 69 66     Troponin: No results for input(s): TROPONINI in the last 72 hours.   BNP: No results for input(s): BNP in the last 72 hours. Lipids: No results for input(s): CHOL, HDL in the last 72 hours. Invalid input(s): LDLCALCU, TRIGLYCERIDE  ABGs:  No results for input(s): PHART, BDL5FTF, PO2ART, UNA2GJS, BEART, THGBART, G5XZCOIS, EQB0FTH in the last 72 hours. INR:   Recent Labs     07/28/21  2145 07/29/21  0450 07/30/21  0500   INR 1.96* 1.97* 2.27*     Lactate: No results for input(s): LACTATE in the last 72 hours. Cultures:  -----------------------------------------------------------------  RAD:   CT CHEST WO CONTRAST   Final Result      1. Unchanged large multiloculated right pleural effusion and new small left pleural effusion. 2.  Worsening opacities involving both lungs. Differential considerations include infection, pulmonary edema, pneumonitis, diffuse alveolar damage, and hemorrhage. 3.  Cervical, thoracic, and upper abdominal lymphadenopathy persists. This may be reactive, however metastatic disease and lymphoma can't be excluded. 4.  Unchanged cirrhosis with sequela of portal hypertension. XR CHEST PORTABLE   Final Result      Complete opacification of the right hemithorax. CT GUIDED THORACENTESIS   Final Result      Uneventful CT-guided right thoracentesis      XR CHEST PORTABLE   Final Result      Worsening infiltrates. CT ABDOMEN PELVIS W IV CONTRAST Additional Contrast? None   Final Result      CHEST      1. Large multiloculated right pleural effusion. Recommend diagnostic and therapeutic thoracentesis. 2.  Complete atelectasis of the right upper lobe. Right upper lobe airway secretions. 3.  Compressive atelectasis in the right lower lobe and middle lobe. 4.  Ill-defined nodular opacities in left upper lobe likely infectious or inflammatory but nonspecific.   5.  Mild mediastinal and bilateral supraclavicular lymphadenopathy. ABDOMEN AND PELVIS      1.   Cirrhotic liver with stigmata of portal hypertension including splenomegaly, mild PNA and pleural effusion likely 2/2 hepatic cirrhosis   Hxo 1 week of cough productive of green sputum, chronic worsening shortness of breath (x3 months), 35 pack-year smoking history. OA WBC 35.9, tachycardia (106), hypertensive (176/86). CT Chest 7/25/21 shows large multiloculated right pleural effusion, atelectasis of RUL (complete), RML and RLL (compresison), GEORGINA nodular opacities, mediastinal and BL supraclavicular LAD. TTE 7/27/21 showed Mitral leaflet thickening which could represent a vegetation. LVEF 60-65%, mild mitral regurgitation, mild tricuspid regurgitation.  - Thoracentesis removed 850mL fluid 7/28/21, pending cultures. - increasing O2 requirement, currently on 12 L NC, stating 95%, baseline- RA   - Pleural fluid was cloudy, yellow fluid, with protein 1.0, glucose 92, pH 7.0, RBC 6000, and neutrophil 67. Suggestive of transudative etiology. - Repeat CT chest 7/29/21 showed unchanged large multiloculated right pleural effusion and new small left pleural effusion. Worsening opacities involving both lungs. - continue lasix 40 IV TID and aldactone 100 PO   - Blood cultures positive for strep pneumoniae  - Follow-up repeat blood cultures - pending   - continue ceftriaxone (7/28-)  - azithromycin discontinued per ID  - prol elevated, suspect aspiration for pts worsening O2 status   - Escalate O2 if necessary to maintain O2 sat of >90%. - Infectious disease, nephrology, and pulmonology consulted, rec's appreciated.   - COVID negative        Decompensated Hepatic cirrhosis 2/2 HBV and HCV infection   Hxo IVDU x10 years, increasing abdominal girth. HCV ag and abd positive, HBsAg positive  HCV RNA quantitative <10   CT abd/pelvis w/con 7/25/21 shows cirrhotic liver with portal HTN, splenomegaly, mild ascites, esophageal/gastric varices, portal hepatitis. Retroperitoneal LAD. CT chest w/cont 7/25/21 shows mediastinal and bilateral supraclavicular LAD.   LFTs OA and now are consistent with cirrhosis  - NWC (7.48) today   - cont Lasix 40mg IV tid  - cont Spironolactone 100mg IV   - Continue rifaxamin   - Track strict I/O   - Empiric ceftriaxone (7/28 - )   - Consult GI for cirrhosis with out-patient follow-up     Strep pneumo bacteremia 2/2 IVDU   - procal elevated 0.43   - cont rocephin       Bleeding from IV sites likely 2/2 Cirrhosis   This morning patient began to ooze blood from IV sites. IVs removed. INR 1.97> 2.27  - decreased level of fibrinogen, indicating worsening liver function   - Continue to trend platelets  - Consider possible platelet transfusion.      Bilateral lower extremity rash 2/2 Chronic venous stasis  Hxo rash x3 months. Undocumented hxo HCV (untreated), 10 year IVDU  PE ankle to knee, erythematous, non-tender, wart-like. - HBV and HCV positive  - Monitor for any growth/progression of symptoms     Substance use disorder, IVDU  10 year hxo IVDU fentanyl tid  PE shows tract marks on BL upper extremities  HIV negative. HBV and HCV positive 7/27/2021  - COWS protocol  - Social work consulted     Hematuria (Resolved)  Hxo hematuria x 4-5 months, transient, painless, dark red throughout. 35 py smoking history.   UA 7/25/21 shows 21-50 RBC hpf  CT abd/pelvis w/contrast shows no abnormalities in the bladder  - Urine now clear, without evidence of blood  - If hematuria recurs, bladder scan with possible cystoscopy, repeat U/A, consult urology  - Monitor urinal for signs of blood  - Follow up with urology for outpatient cystoscopy      Code Status: full code   FEN: regular diet  PPX: protonix   DISPO: wards     Saundra Moncada MD, PGY-1  07/30/21  8:34 AM    This patient has been staffed and discussed with Garfield Hughes MD.

## 2021-07-30 NOTE — PROGRESS NOTES
ID Follow-up NOTE  RESIDENT NOTE - reviewed / edited, attending note at bottom    CC:   Pneumococcal bacteremia, R effusion, HCV/ HBV infection  Antibiotics: Ceftriaxone    Admit Date: 7/25/2021  Hospital Day: 6    Subjective:     NAEO, patient negative for COVID, blood cultures repeated 7/29. Worsening CT opacities on CT 7/29, patient may have aspirated. Patient sleeping in bed, easily arousable. He states he feels better than yesterday. Denies trouble swallowing, continued hemoptysis. Today, WBC 8.9, O2 sats 95% on 12L HFNC. Objective:     Patient Vitals for the past 8 hrs:   BP Temp Temp src Pulse Resp SpO2 Weight   07/30/21 0842     24 95 %    07/30/21 0806 118/68 97.8 °F (36.6 °C) Axillary 68 24 95 %    07/30/21 0612      90 %    07/30/21 0540     20 93 %    07/30/21 0426 130/81 98.8 °F (37.1 °C) Oral 77 22 93 % 199 lb 1.2 oz (90.3 kg)   07/30/21 0225      93 %    07/30/21 0155     22 94 %      I/O last 3 completed shifts: In: 5817.3 [P.O.:1320; I.V.:10; Blood:4487.3]  Out: 2450 [Urine:2450]  No intake/output data recorded.     EXAM:  GENERAL:      No apparent distress, uncomfortable  HEENT:           Membranes moist, no oral lesion, PERRL  NECK:             Supple, no lymphadenopathy  LUNGS:           Wheezes, rhonchi, dec breath sounds on the right   CARDIAC:       RRR, no murmur appreciated  ABD:                + BS, distended, TTP  EXT:                Bilateral LE chronic appearing hypertrophic, nodular lesions, bilateral UE tract marks  NEURO:          No focal neurologic findings  PSYCH:           Orientation, sensorium, mood normal  LINES:            central line       Data Review:  Lab Results   Component Value Date    WBC 8.9 07/30/2021    HGB 9.6 (L) 07/30/2021    HCT 28.5 (L) 07/30/2021    MCV 93.2 07/30/2021    PLT 60 (L) 07/30/2021     Lab Results   Component Value Date    CREATININE <0.5 (L) 07/30/2021    BUN 16 07/30/2021     07/30/2021    K 3.0 (L) 07/30/2021    CL 95 (L) 07/30/2021    CO2 38 (H) 07/30/2021       Hepatic Function Panel:   Lab Results   Component Value Date    ALKPHOS 66 07/30/2021    ALT 14 07/30/2021    AST 35 07/30/2021    PROT 5.7 07/30/2021    BILITOT 4.3 07/30/2021    BILIDIR 1.8 07/25/2021    IBILI 3.3 07/25/2021    LABALBU 3.4 07/30/2021       MICRO:  7/28/2021: Pleural fluid gram stain and cx's: 1+WBCs, no organisms seen    IMAGING:  CT-guided right thoracentesis, therapeutic 7/28/2021  HISTORY: Loculated right pleural effusion     Procedure performed by Dr. Char Murillo after explanation of procedure, risks and complications to patient. Recent CT chest 7/25/2021 was reviewed prior to the procedure     Patient placed in a left lateral decubitus position and scans repeated through the chest with redemonstration of the large multiloculated pleural effusion. Up-to-date CT equipment with radiation dose reduction techniques     Skin prepped in sterile manner and local anesthesia administered. Angiocath advanced into one of the larger loculations of fluid via lateral intercostal approach.     850 mL of clear yellowish fluid withdrawn for therapeutic purposes. Samples sent for analysis as ordered. Patient tolerated procedure well. Following the procedure, repeat imaging shows a few tiny air bubbles within the lateral pleural space related to   the procedure.     CT CHEST WO CONTRAST 7/29/2021  1.   Unchanged large multiloculated right pleural effusion and new small left pleural effusion. 2.  Worsening opacities involving both lungs. Differential considerations include infection, pulmonary edema, pneumonitis, diffuse alveolar damage, and hemorrhage. 3.  Cervical, thoracic, and upper abdominal lymphadenopathy persists. This may be reactive, however metastatic disease and lymphoma can't be excluded. 4.  Unchanged cirrhosis with sequela of portal hypertension.        Scheduled Meds:   potassium chloride  20 mEq Oral Once    albuterol sulfate HFA 2 puff Inhalation 4x daily    furosemide  40 mg Intravenous Daily    cefTRIAXone (ROCEPHIN) IV  2,000 mg Intravenous Q24H    sodium chloride flush  5-40 mL Intravenous 2 times per day    spironolactone  100 mg Oral Daily    sodium chloride flush  5-40 mL Intravenous 2 times per day       Continuous Infusions:   sodium chloride      sodium chloride         PRN Meds:  albuterol, sodium chloride (Inhalant), sodium chloride flush, sodium chloride, ondansetron **OR** ondansetron, sodium chloride flush, sodium chloride, [] traMADol **AND** cloNIDine      Assessment:     Substance abuse  Cirrhosis - HCV, HBV positive  R pneumonia  R effusion: new small pleural effusion and worsening opacities of both lungs--reviewed images with radiologist, possible aspiration in the left   -fluid transudative, hepatic hydrothorax  S pneumoniae bacteremia, pul source  procalcitonin elevated    Plan:     -Cont ceftriaxone + azithromycin  -urine strep pneumo antigen pending  -Thoracentesis / pul catheter drainage - may need multiple catheters, may need intra-pleural thrombolysis     Post-discharge - GI f/u for rx HBV / HCV  Drug abstinence  Discussed with pt with Dr Nichol Montes De Oca MD, PGY-1    Addendum to Resident Progress note:  Pt seen, examined and evaluated.  I have reviewed the current history, physical findings, labs and assessment and plan and agree with note as documented by resident (Dr. Quarles).     46 yo man with no established med hx, hx opiate use (fentanyl)     Presents with SOB, cough with 'green' sputum over weeks, worse  Pt with abd pain, RUQ.    Developed nausea / vomiting, reason for coming to ED     In ED , afeb, WBC 35.9, elevated pro-BNP, alb 2.2, bili 5.1  Imaging with R effusion, lung collapse, cirrhosis with varices  Admit, started on ceftriaxone + azithromycin      IR thoracentesis 850 ml - transudate (pH 7.0, glu 92, prot 1.0, GS 1+WBC, no organism)      f/u CT - b/l

## 2021-07-31 LAB
A/G RATIO: 1.2 (ref 1.1–2.2)
ALBUMIN SERPL-MCNC: 3 G/DL (ref 3.4–5)
ALP BLD-CCNC: 68 U/L (ref 40–129)
ALT SERPL-CCNC: 14 U/L (ref 10–40)
AMPHETAMINE SCREEN, URINE: ABNORMAL
ANION GAP SERPL CALCULATED.3IONS-SCNC: 7 MMOL/L (ref 3–16)
AST SERPL-CCNC: 40 U/L (ref 15–37)
BARBITURATE SCREEN URINE: ABNORMAL
BASE EXCESS ARTERIAL: 7 (ref -3–3)
BASOPHILS ABSOLUTE: 0 K/UL (ref 0–0.2)
BASOPHILS RELATIVE PERCENT: 0.3 %
BENZODIAZEPINE SCREEN, URINE: ABNORMAL
BILIRUB SERPL-MCNC: 4.1 MG/DL (ref 0–1)
BODY FLUID CULTURE, STERILE: NORMAL
BUN BLDV-MCNC: 18 MG/DL (ref 7–20)
CALCIUM IONIZED: 1.19 MMOL/L (ref 1.12–1.32)
CALCIUM SERPL-MCNC: 8 MG/DL (ref 8.3–10.6)
CANNABINOID SCREEN URINE: POSITIVE
CHLORIDE BLD-SCNC: 97 MMOL/L (ref 99–110)
CO2: 37 MMOL/L (ref 21–32)
COCAINE METABOLITE SCREEN URINE: ABNORMAL
CREAT SERPL-MCNC: <0.5 MG/DL (ref 0.9–1.3)
EOSINOPHILS ABSOLUTE: 0.4 K/UL (ref 0–0.6)
EOSINOPHILS RELATIVE PERCENT: 4.2 %
GFR AFRICAN AMERICAN: >60
GFR NON-AFRICAN AMERICAN: >60
GLOBULIN: 2.6 G/DL
GLUCOSE BLD-MCNC: 101 MG/DL (ref 70–99)
GLUCOSE BLD-MCNC: 133 MG/DL (ref 70–99)
GRAM STAIN RESULT: NORMAL
HCO3 ARTERIAL: 31.9 MMOL/L (ref 21–29)
HCT VFR BLD CALC: 28.4 % (ref 40.5–52.5)
HEMOGLOBIN: 9.6 G/DL (ref 13.5–17.5)
INR BLD: 2.11 (ref 0.88–1.12)
LACTATE: 9.19 MMOL/L (ref 0.4–2)
LYMPHOCYTES ABSOLUTE: 2 K/UL (ref 1–5.1)
LYMPHOCYTES RELATIVE PERCENT: 21.1 %
Lab: ABNORMAL
MAGNESIUM: 1.9 MG/DL (ref 1.8–2.4)
MCH RBC QN AUTO: 31.7 PG (ref 26–34)
MCHC RBC AUTO-ENTMCNC: 33.9 G/DL (ref 31–36)
MCV RBC AUTO: 93.4 FL (ref 80–100)
METHADONE SCREEN, URINE: ABNORMAL
MONOCYTES ABSOLUTE: 0.6 K/UL (ref 0–1.3)
MONOCYTES RELATIVE PERCENT: 6.8 %
NEUTROPHILS ABSOLUTE: 6.4 K/UL (ref 1.7–7.7)
NEUTROPHILS RELATIVE PERCENT: 67.6 %
O2 SAT, ARTERIAL: 90 % (ref 93–100)
OPIATE SCREEN URINE: ABNORMAL
OXYCODONE URINE: ABNORMAL
PCO2 ARTERIAL: 54.6 MM HG (ref 35–45)
PDW BLD-RTO: 17.7 % (ref 12.4–15.4)
PERFORMED ON: ABNORMAL
PH ARTERIAL: 7.38 (ref 7.35–7.45)
PH UA: 5.5
PHENCYCLIDINE SCREEN URINE: ABNORMAL
PLATELET # BLD: 69 K/UL (ref 135–450)
PMV BLD AUTO: 8.7 FL (ref 5–10.5)
PO2 ARTERIAL: 61.2 MM HG (ref 75–108)
POC POTASSIUM: 3.5 MMOL/L (ref 3.5–5.1)
POC SAMPLE TYPE: ABNORMAL
POC SODIUM: 141 MMOL/L (ref 136–145)
POTASSIUM REFLEX MAGNESIUM: 3.5 MMOL/L (ref 3.5–5.1)
PROPOXYPHENE SCREEN: ABNORMAL
PROTHROMBIN TIME: 24.6 SEC (ref 9.9–12.7)
RBC # BLD: 3.04 M/UL (ref 4.2–5.9)
SODIUM BLD-SCNC: 141 MMOL/L (ref 136–145)
TCO2 ARTERIAL: 34 MMOL/L
TOTAL PROTEIN: 5.6 G/DL (ref 6.4–8.2)
WBC # BLD: 9.4 K/UL (ref 4–11)

## 2021-07-31 PROCEDURE — 94761 N-INVAS EAR/PLS OXIMETRY MLT: CPT

## 2021-07-31 PROCEDURE — 2580000003 HC RX 258: Performed by: STUDENT IN AN ORGANIZED HEALTH CARE EDUCATION/TRAINING PROGRAM

## 2021-07-31 PROCEDURE — G0480 DRUG TEST DEF 1-7 CLASSES: HCPCS

## 2021-07-31 PROCEDURE — 36592 COLLECT BLOOD FROM PICC: CPT

## 2021-07-31 PROCEDURE — 82330 ASSAY OF CALCIUM: CPT

## 2021-07-31 PROCEDURE — 83605 ASSAY OF LACTIC ACID: CPT

## 2021-07-31 PROCEDURE — 6360000002 HC RX W HCPCS: Performed by: INTERNAL MEDICINE

## 2021-07-31 PROCEDURE — 84295 ASSAY OF SERUM SODIUM: CPT

## 2021-07-31 PROCEDURE — 2700000000 HC OXYGEN THERAPY PER DAY

## 2021-07-31 PROCEDURE — 80307 DRUG TEST PRSMV CHEM ANLYZR: CPT

## 2021-07-31 PROCEDURE — 85025 COMPLETE CBC W/AUTO DIFF WBC: CPT

## 2021-07-31 PROCEDURE — 2060000000 HC ICU INTERMEDIATE R&B

## 2021-07-31 PROCEDURE — 94640 AIRWAY INHALATION TREATMENT: CPT

## 2021-07-31 PROCEDURE — 6370000000 HC RX 637 (ALT 250 FOR IP): Performed by: STUDENT IN AN ORGANIZED HEALTH CARE EDUCATION/TRAINING PROGRAM

## 2021-07-31 PROCEDURE — 84132 ASSAY OF SERUM POTASSIUM: CPT

## 2021-07-31 PROCEDURE — 99233 SBSQ HOSP IP/OBS HIGH 50: CPT | Performed by: INTERNAL MEDICINE

## 2021-07-31 PROCEDURE — 2580000003 HC RX 258: Performed by: INTERNAL MEDICINE

## 2021-07-31 PROCEDURE — 83735 ASSAY OF MAGNESIUM: CPT

## 2021-07-31 PROCEDURE — 82803 BLOOD GASES ANY COMBINATION: CPT

## 2021-07-31 PROCEDURE — 80053 COMPREHEN METABOLIC PANEL: CPT

## 2021-07-31 PROCEDURE — 85610 PROTHROMBIN TIME: CPT

## 2021-07-31 PROCEDURE — 82947 ASSAY GLUCOSE BLOOD QUANT: CPT

## 2021-07-31 RX ORDER — NALOXONE HYDROCHLORIDE 0.4 MG/ML
INJECTION, SOLUTION INTRAMUSCULAR; INTRAVENOUS; SUBCUTANEOUS
Status: DISPENSED
Start: 2021-07-31 | End: 2021-08-01

## 2021-07-31 RX ORDER — SPIRONOLACTONE 50 MG/1
100 TABLET, FILM COATED ORAL ONCE
Status: DISCONTINUED | OUTPATIENT
Start: 2021-07-31 | End: 2021-08-01

## 2021-07-31 RX ADMIN — HYDROCORTISONE: 1 OINTMENT TOPICAL at 20:08

## 2021-07-31 RX ADMIN — ALBUTEROL SULFATE 2 PUFF: 90 AEROSOL, METERED RESPIRATORY (INHALATION) at 15:43

## 2021-07-31 RX ADMIN — CLONIDINE HYDROCHLORIDE 0.1 MG: 0.1 TABLET ORAL at 22:06

## 2021-07-31 RX ADMIN — Medication 10 ML: at 12:56

## 2021-07-31 RX ADMIN — ALBUTEROL SULFATE 2 PUFF: 90 AEROSOL, METERED RESPIRATORY (INHALATION) at 11:47

## 2021-07-31 RX ADMIN — FUROSEMIDE 40 MG: 10 INJECTION, SOLUTION INTRAMUSCULAR; INTRAVENOUS at 08:44

## 2021-07-31 RX ADMIN — Medication 10 ML: at 21:13

## 2021-07-31 RX ADMIN — CEFTRIAXONE SODIUM 2000 MG: 2 INJECTION, POWDER, FOR SOLUTION INTRAMUSCULAR; INTRAVENOUS at 12:58

## 2021-07-31 RX ADMIN — CLONIDINE HYDROCHLORIDE 0.1 MG: 0.1 TABLET ORAL at 00:53

## 2021-07-31 RX ADMIN — ALBUTEROL SULFATE 2 PUFF: 90 AEROSOL, METERED RESPIRATORY (INHALATION) at 21:41

## 2021-07-31 RX ADMIN — CLONIDINE HYDROCHLORIDE 0.1 MG: 0.1 TABLET ORAL at 04:22

## 2021-07-31 RX ADMIN — FUROSEMIDE 40 MG: 10 INJECTION, SOLUTION INTRAMUSCULAR; INTRAVENOUS at 15:15

## 2021-07-31 RX ADMIN — HYDROCORTISONE: 1 OINTMENT TOPICAL at 12:57

## 2021-07-31 RX ADMIN — ALBUTEROL SULFATE 2 PUFF: 90 AEROSOL, METERED RESPIRATORY (INHALATION) at 07:53

## 2021-07-31 RX ADMIN — SPIRONOLACTONE 100 MG: 50 TABLET ORAL at 12:54

## 2021-07-31 ASSESSMENT — ENCOUNTER SYMPTOMS
WHEEZING: 0
SHORTNESS OF BREATH: 1
NAUSEA: 0
VOMITING: 0
COUGH: 1
DIARRHEA: 0

## 2021-07-31 ASSESSMENT — PAIN SCALES - GENERAL
PAINLEVEL_OUTOF10: 0
PAINLEVEL_OUTOF10: 7

## 2021-07-31 ASSESSMENT — PAIN DESCRIPTION - LOCATION: LOCATION: ABDOMEN

## 2021-07-31 ASSESSMENT — PAIN DESCRIPTION - PAIN TYPE: TYPE: ACUTE PAIN

## 2021-07-31 NOTE — PROGRESS NOTES
CC: Pleural effusion    Feeling a little bit better today, not as short of breath. Cough is productive of mucopurulent sputum. No hemoptysis. No chest pain. Afebrile  WBC 9.4. Hemoglobin 9.6 g, platelets 69  Blood culture +2/2S pneumoniae  Antibiotic therapy day #5 with ceftriaxone  Pro time still 24.6, INR 2.11  /74. NSR. S1, S2 normal.  O2 saturation 94% on O2 at 8 L/min  Breath sounds virtually absent over the R hemithorax, normal on the left. Abdomen benign. No peripheral edema. Fluid balance -1 L / 24 hours on Lasix and spironolactone  Creatinine 0.5. Sodium 141, potassium 3.5, chloride 97, CO2 37. Albumin 3.0    A&P: Hypoxemia is associated with very large pleural effusion and right lung atelectasis, and diffuse airspace opacities in left lung. Strep bacteremia suspected respiratory source. Pleural effusion is consistent with hepatic hydrothorax, and should improve with additional diuresis. Hypoxemia improving with antibiotic therapy and with negative fluid balance in past 24 hours. Hepatic function remains impaired, he shows no significant response to improvement of pro time with vitamin K.   Renal function intact

## 2021-07-31 NOTE — PLAN OF CARE
Problem: Falls - Risk of:  Goal: Will remain free from falls  Description: Will remain free from falls  Outcome: Ongoing     Problem: Skin Integrity:  Goal: Will show no infection signs and symptoms  Description: Will show no infection signs and symptoms  Outcome: Ongoing     Problem: Pain:  Goal: Pain level will decrease  Description: Pain level will decrease  Outcome: Ongoing     Problem: Cardiac:  Goal: Hemodynamic stability will improve  Description: Hemodynamic stability will improve  Outcome: Ongoing  Note: VSS this shift.    Vitals:    07/30/21 2005 07/30/21 2052 07/30/21 2312 07/31/21 0046   BP: 111/68  112/74    Pulse: 76  77    Resp: 18 16 18 17   Temp: 98.7 °F (37.1 °C)  98.3 °F (36.8 °C)    TempSrc: Oral  Oral    SpO2: 98% 97% 94% 95%   Weight:       Height:

## 2021-07-31 NOTE — SIGNIFICANT EVENT
CODE BLUE DOCUMENTATION    CODE JOSEF called at 4:44 PM.  Upon entry to the patient's room nurse told me that the patient had a visitor who reported and suddenly patient was noted to have bradycardia and eventually was unresponsive and lost pulse. CPR was already ongoing prior to my arrival.  After the patient was placed on the cardiac monitor,  prior to the epinephrine injection we did a pulse and rhythm check. At that time the patient was noted to have a bounding pulse. 2 mg Narcan was given. Led to the patient spontaneously arousing. Upon awakening patient did not recall the events. Reports he had a visitor that passed him a orange pop and that is all he remembers. ABG was obtained which showed a compensated respiratory acidosis. Lactic acid elevated at 9.19. Patient is attending Dr. Nay South was at bedside with me after ROSC was achieved. We discussed the patient's case, and felt appropriate to transfer the patient to PCU for further monitoring.

## 2021-07-31 NOTE — PROGRESS NOTES
GI Progress Note      Tomeka Bellamy is a 45 y.o. male patient. 1. Pleural effusion    2. Hypoxia    3. Hepatitis C virus infection without hepatic coma, unspecified chronicity    4. Cirrhosis of liver with ascites, unspecified hepatic cirrhosis type ([facility] [address]        Admit Date: 2021    Subjective:       Appears less tachypneic though still on high flow O2. Denies abdominal pain, N, V. Eating breakfast. Had normal BM. No s/s bleeding.        ROS:  As per above    Scheduled Meds:   spironolactone  100 mg Oral Once    furosemide  40 mg Intravenous TID    hydrocortisone   Topical BID    albuterol sulfate HFA  2 puff Inhalation 4x daily    cefTRIAXone (ROCEPHIN) IV  2,000 mg Intravenous Q24H    sodium chloride flush  5-40 mL Intravenous 2 times per day    spironolactone  100 mg Oral Daily    sodium chloride flush  5-40 mL Intravenous 2 times per day       Continuous Infusions:   sodium chloride      sodium chloride         PRN Meds:  albuterol, sodium chloride (Inhalant), sodium chloride flush, sodium chloride, ondansetron **OR** ondansetron, sodium chloride flush, sodium chloride, [] traMADol **AND** cloNIDine      Objective:       Patient Vitals for the past 24 hrs:   BP Temp Temp src Pulse Resp SpO2 Weight   21 0840 106/74 98.5 °F (36.9 °C)  79 16 94 %    21 0753     16 93 %    21 0619      94 %    21 0412 97/63 97.4 °F (36.3 °C) Axillary 73 20 91 % 198 lb 10.2 oz (90.1 kg)   21 0215      95 %    21 0046     17 95 %    21 2312 112/74 98.3 °F (36.8 °C) Oral 77 18 94 %    21 2236      95 %    21 2052     16 97 %    21 111/68 98.7 °F (37.1 °C) Oral 76 18 98 %    21 1815 104/70 98.1 °F (36.7 °C) Oral 74 20 99 %    21 1609     18 95 %    21 1248 107/68 98.8 °F (37.1 °C) Oral 83 22 94 %    21 1242     20 95 %        Exam:  VITALS:  /74   Pulse 79   Temp 98.5 °F

## 2021-07-31 NOTE — PROGRESS NOTES
RN received patient from U. [employer] camera was already in the room, given patient's recent history of IV drug use and use of COWS withdrawal orders. Vital signs stable - see 1557 vital sign flow sheet. On 8 liters high-flow nasal cannula, RT set up upon arrival. Patient was on a continuous pulse ox due to O2 demands. On telemetry - normal sinus rhythm. Patient was settled in, plan of care reviewed. Patient had a visitor which he called his \"Niece\" who was sitting on his bed talking with patient when RN left the room. RN received call from PCU nina at 1625 noting that patient had went from normal sinus rhythm at 80 bpm to junctional rhythm at 30 bpm on telemetry. RN went straight the patients room and found him unresponsive, with continuous pulse ox reading SPO2 of 6% and heart rate at 20 bpm. RN sternal rubbed patient without response. RN felt for carotid pulse, no manual pulse felt. Continuous pulse ox unable to read. Charge RN entered room and called code while RN started compressions. Crash cart arrived and backboard placed under patient, compressions resumed. Code team arrived at second round of compressions. Code leader called for pulse check, bounding femoral pulse noted at that time. 1 mg of epinephrine given. Patient remained unconscious but was breathing spontaneously at that time. Code leader ordered 2 mg of intravenous narcan at that time. Patient immediately regained consciousness but was still lethargic. IV Normal saline started wide open. Additional 0.4 of narcan was ordered by code leader and given. Patient regained full consciousness, but was unaware of events leading up to code. Visitor was not in the room when RN arrived originally. 1648 vital signs: heart rate 95 bpm, blood pressure- 124/68, SPO2 91% on non-re breather, and temperature - 98.1 oral. ABG and point of care labs obtained by ICU RN. Serum and urine toxicity screen sent by this RN per order.     Decision was made to transfer patient to progressive care unit for further monitoring. Patient needs moved off of PCU and that room to be cleaned for this patient to transfer. RN gave report to night shift nurse who is taking over care until transfer. Sitter has remained at bedside since the end of the code.      Electronically signed by Aury Horowitz on 7/31/2021 at 8:39 PM

## 2021-07-31 NOTE — PROGRESS NOTES
Nephrology Note                                                                                                                                                                                                                                                                                                                                                               Office : [phone]     Fax :[phone]              Patient's Name: Clifford Virgen  1:13 PM  7/31/2021    Reason for Consult:  Adrian De Los Santos much better  Edema better   On lasix        reports that he has been smoking cigarettes. He has been smoking about 1.00 pack per day. He has never used smokeless tobacco. He reports current drug use. Drug: IV. He reports that he does not drink alcohol. Allergies:  Patient has no known allergies.     Current Medications:    spironolactone (ALDACTONE) tablet 100 mg, Once  furosemide (LASIX) injection 40 mg, TID  hydrocortisone 1 % ointment, BID  albuterol sulfate  (90 Base) MCG/ACT inhaler 2 puff, 4x daily  cefTRIAXone (ROCEPHIN) 2000 mg IVPB in D5W 50ml minibag, Q24H  albuterol (PROVENTIL) nebulizer solution 2.5 mg, Q4H PRN  sodium chloride (Inhalant) 3 % nebulizer solution 4 mL, PRN  sodium chloride flush 0.9 % injection 5-40 mL, 2 times per day  sodium chloride flush 0.9 % injection 5-40 mL, PRN  0.9 % sodium chloride infusion, PRN  ondansetron (ZOFRAN-ODT) disintegrating tablet 4 mg, Q8H PRN   Or  ondansetron (ZOFRAN) injection 4 mg, Q6H PRN  spironolactone (ALDACTONE) tablet 100 mg, Daily  sodium chloride flush 0.9 % injection 5-40 mL, 2 times per day  sodium chloride flush 0.9 % injection 5-40 mL, PRN  0.9 % sodium chloride infusion, PRN  cloNIDine (CATAPRES) tablet 0.1 mg, PRN        Review of Systems:   14 point ROS obtained but were negative except mentioned in HPI      Physical exam:     Vitals:  /69   Pulse 69   Temp 98.3 °F (36.8 °C)   Resp 20   Ht 5' 10\" (1.778 m)   Wt 198 lb 10.2 oz (90.1 kg)   SpO2 94%   BMI 28.50 kg/m²   Constitutional:  AA   Skin: no rash, turgor wnl  Heent:  eomi, mmm  Neck: no bruits or jvd noted  Cardiovascular:  S1, S2 without m/r/g  Respiratory: CTA B without w/r/r  Abdomen:  +bs, soft, nt, nd  Ext: + lower extremity edema  Psychiatric: mood and affect appropriate  Musculoskeletal:  Rom, muscular strength intact    Data:   Labs:  CBC:   Recent Labs     07/29/21 0450 07/30/21 0500 07/31/21 0430   WBC 7.4 8.9 9.4   HGB 9.6* 9.6* 9.6*   PLT 52* 60* 69*     BMP:    Recent Labs     07/29/21 0450 07/30/21 0500 07/31/21  0430    139 141   K 3.1* 3.0* 3.5   CL 96* 95* 97*   CO2 35* 38* 37*   BUN 21* 16 18   CREATININE <0.5* <0.5* <0.5*   GLUCOSE 76 103* 101*     Ca/Mg/Phos:   Recent Labs     07/29/21 0450 07/30/21 0500 07/31/21  0430   CALCIUM 8.1* 8.1* 8.0*   MG 1.60* 1.90 1.90     Hepatic:   Recent Labs     07/29/21 0450 07/30/21 0500 07/31/21  0430   AST 35 35 40*   ALT 14 14 14   BILITOT 4.0* 4.3* 4.1*   ALKPHOS 69 66 68     Troponin:   No results for input(s): TROPONINI in the last 72 hours. BNP: No results for input(s): BNP in the last 72 hours. Lipids: No results for input(s): CHOL, TRIG, HDL, LDLCALC, LABVLDL in the last 72 hours. ABGs: No results for input(s): PHART, PO2ART, YOU0VQB in the last 72 hours. INR:   Recent Labs     07/29/21 0450 07/30/21 0500 07/31/21  0430   INR 1.97* 2.27* 2.11*     UA:  No results for input(s): Connie Spatz, GLUCOSEU, BILIRUBINUR, KETUA, SPECGRAV, BLOODU, PHUR, PROTEINU, UROBILINOGEN, NITRU, LEUKOCYTESUR, [place] in the last 72 hours. Urine Microscopic:   No results for input(s): LABCAST, BACTERIA, COMU, HYALCAST, WBCUA, RBCUA, EPIU in the last 72 hours. Urine Culture: No results for input(s): LABURIN in the last 72 hours. Urine Chemistry:   No results for input(s): Brian Eduardo, PROTEINUR, NAUR in the last 72 hours. IMAGING:  CT CHEST WO CONTRAST   Final Result      1. Unchanged large multiloculated right pleural effusion and new small left pleural effusion. 2.  Worsening opacities involving both lungs. Differential considerations include infection, pulmonary edema, pneumonitis, diffuse alveolar damage, and hemorrhage. 3.  Cervical, thoracic, and upper abdominal lymphadenopathy persists. This may be reactive, however metastatic disease and lymphoma can't be excluded. 4.  Unchanged cirrhosis with sequela of portal hypertension. XR CHEST PORTABLE   Final Result      Complete opacification of the right hemithorax. CT GUIDED THORACENTESIS   Final Result      Uneventful CT-guided right thoracentesis      XR CHEST PORTABLE   Final Result      Worsening infiltrates. CT ABDOMEN PELVIS W IV CONTRAST Additional Contrast? None   Final Result      CHEST      1. Large multiloculated right pleural effusion. Recommend diagnostic and therapeutic thoracentesis. 2.  Complete atelectasis of the right upper lobe. Right upper lobe airway secretions. 3.  Compressive atelectasis in the right lower lobe and middle lobe. 4.  Ill-defined nodular opacities in left upper lobe likely infectious or inflammatory but nonspecific.   5.  Mild mediastinal and bilateral supraclavicular lymphadenopathy. ABDOMEN AND PELVIS      1. Cirrhotic liver with stigmata of portal hypertension including splenomegaly, mild ascites and esophageal and gastric varices. 2.  Jessica hepatis, celiac axis and retroperitoneal lymphadenopathy. 3.  Diffusely thickened bowel and stomach may represent sequela of portal hypertension versus infectious/inflammatory. 4.  Anasarca. 5.  Right inguinal hernia containing small bowel without evidence of complication. CT CHEST W CONTRAST   Final Result      CHEST      1. Large multiloculated right pleural effusion. Recommend diagnostic and therapeutic thoracentesis. 2.  Complete atelectasis of the right upper lobe.  Right upper lobe airway secretions. 3.  Compressive atelectasis in the right lower lobe and middle lobe. 4.  Ill-defined nodular opacities in left upper lobe likely infectious or inflammatory but nonspecific.   5.  Mild mediastinal and bilateral supraclavicular lymphadenopathy. ABDOMEN AND PELVIS      1. Cirrhotic liver with stigmata of portal hypertension including splenomegaly, mild ascites and esophageal and gastric varices. 2.  Jessica hepatis, celiac axis and retroperitoneal lymphadenopathy. 3.  Diffusely thickened bowel and stomach may represent sequela of portal hypertension versus infectious/inflammatory. 4.  Anasarca. 5.  Right inguinal hernia containing small bowel without evidence of complication. CT HEAD WO CONTRAST   Final Result      1. No acute intracranial hemorrhage or mass effect. 2.  Sinus disease. XR CHEST (2 VW)   Final Result      Extensive right pleural-parenchymal opacities, which can be seen with pneumonia or malignancy. CT chest with contrast.                Assessment/Plan   1. Cirrhosis     2. Anasarca     3. Anemia    4. Acid- base/ Electrolyte imbalance     5.  Hep C     Plan   - aldactone   - Albumin + lasix  - Ur studies - reviewed   - monitor UO and renal function   - labs in am   - Daily wts   - No NSAIDS   - Paracentesis                 Thank you for allowing us to participate in care of Rolando Sheikh MD  Feel free to contact me   [facility] [address]  Office : [phone]  Fax :[phone]

## 2021-07-31 NOTE — PROGRESS NOTES
Progress Note    Admit Date: 2021  Diet: ADULT DIET; Regular; Low Sodium (2 gm)    CC: abdominal pain    Interval history: down to 8L NC. Patient sleeping but wakes easily. Feels ok, no complaints. UOP 2750      Medications:     Scheduled Meds:   spironolactone  100 mg Oral Once    furosemide  40 mg Intravenous TID    hydrocortisone   Topical BID    albuterol sulfate HFA  2 puff Inhalation 4x daily    cefTRIAXone (ROCEPHIN) IV  2,000 mg Intravenous Q24H    sodium chloride flush  5-40 mL Intravenous 2 times per day    spironolactone  100 mg Oral Daily    sodium chloride flush  5-40 mL Intravenous 2 times per day     Continuous Infusions:   sodium chloride      sodium chloride       PRN Meds:albuterol, sodium chloride (Inhalant), sodium chloride flush, sodium chloride, ondansetron **OR** ondansetron, sodium chloride flush, sodium chloride, [] traMADol **AND** cloNIDine    Objective:   Vitals:   T-max:  Patient Vitals for the past 8 hrs:   BP Temp Temp src Pulse Resp SpO2 Weight   21 0840 106/74 98.5 °F (36.9 °C)  79 16 94 %    21 0753     16 93 %    21 0619      94 %    21 0412 97/63 97.4 °F (36.3 °C) Axillary 73 20 91 % 198 lb 10.2 oz (90.1 kg)       Intake/Output Summary (Last 24 hours) at 2021 1125  Last data filed at 2021 0412  Gross per 24 hour   Intake 1352 ml   Output 2750 ml   Net -1398 ml     Review of Systems   Constitutional: Negative for chills and fever. Respiratory: Positive for cough and shortness of breath. Negative for wheezing. Cardiovascular: Negative for chest pain and palpitations. Gastrointestinal: Negative for diarrhea, nausea and vomiting. Physical Exam  Constitutional:       General: He is not in acute distress. Appearance: Normal appearance. He is not ill-appearing or toxic-appearing. HENT:      Head: Normocephalic.       Right Ear: External ear normal.      Left Ear: External ear normal.      Nose: Nose normal.      Mouth/Throat:      Mouth: Mucous membranes are moist.      Pharynx: Oropharynx is clear. Eyes:      General:         Right eye: No discharge. Left eye: No discharge. Extraocular Movements: Extraocular movements intact. Conjunctiva/sclera: Conjunctivae normal.   Cardiovascular:      Rate and Rhythm: Normal rate and regular rhythm. Pulses: Normal pulses. Heart sounds: No murmur heard. Pulmonary:      Effort: Pulmonary effort is normal. No respiratory distress. Breath sounds: Rales present. No wheezing. Abdominal:      General: There is no distension. Palpations: Abdomen is soft. Tenderness: There is no abdominal tenderness. There is no guarding or rebound. Musculoskeletal:         General: Swelling present. Normal range of motion. Cervical back: Normal range of motion. No rigidity. Skin:     General: Skin is warm. Coloration: Skin is not jaundiced. Findings: Rash ( chronic venous stasis hypertrophic rash bilaterally) present. Neurological:      Mental Status: He is alert and oriented to person, place, and time. Cranial Nerves: No cranial nerve deficit. Motor: No weakness.       Gait: Gait normal.   Psychiatric:         Mood and Affect: Mood normal.         Behavior: Behavior normal.           LABS:    CBC:   Recent Labs     07/29/21 0450 07/30/21  0500 07/31/21  0430   WBC 7.4 8.9 9.4   HGB 9.6* 9.6* 9.6*   HCT 28.4* 28.5* 28.4*   PLT 52* 60* 69*   MCV 94.2 93.2 93.4     Renal:    Recent Labs     07/29/21 0450 07/30/21  0500 07/31/21  0430    139 141   K 3.1* 3.0* 3.5   CL 96* 95* 97*   CO2 35* 38* 37*   BUN 21* 16 18   CREATININE <0.5* <0.5* <0.5*   GLUCOSE 76 103* 101*   CALCIUM 8.1* 8.1* 8.0*   MG 1.60* 1.90 1.90   ANIONGAP 9 6 7     Hepatic:   Recent Labs     07/29/21 0450 07/30/21  0500 07/31/21  0430   AST 35 35 40*   ALT 14 14 14   BILITOT 4.0* 4.3* 4.1*   PROT 6.0* 5.7* 5.6*   LABALBU 3.3* 3.4 3.0*   ALKPHOS 69 66 68     Troponin:   No results for input(s): TROPONINI in the last 72 hours. BNP: No results for input(s): BNP in the last 72 hours. Lipids:   No results for input(s): CHOL, HDL in the last 72 hours. Invalid input(s): LDLCALCU, TRIGLYCERIDE  ABGs:  No results for input(s): PHART, OVN5TSW, PO2ART, HXT3ESJ, BEART, THGBART, S8FFVLIF, FPZ1AMI in the last 72 hours. INR:   Recent Labs     07/29/21  0450 07/30/21  0500 07/31/21  0430   INR 1.97* 2.27* 2.11*     Lactate: No results for input(s): LACTATE in the last 72 hours. Cultures:  -----------------------------------------------------------------  RAD:   CT CHEST WO CONTRAST   Final Result      1. Unchanged large multiloculated right pleural effusion and new small left pleural effusion. 2.  Worsening opacities involving both lungs. Differential considerations include infection, pulmonary edema, pneumonitis, diffuse alveolar damage, and hemorrhage. 3.  Cervical, thoracic, and upper abdominal lymphadenopathy persists. This may be reactive, however metastatic disease and lymphoma can't be excluded. 4.  Unchanged cirrhosis with sequela of portal hypertension. XR CHEST PORTABLE   Final Result      Complete opacification of the right hemithorax. CT GUIDED THORACENTESIS   Final Result      Uneventful CT-guided right thoracentesis      XR CHEST PORTABLE   Final Result      Worsening infiltrates. CT ABDOMEN PELVIS W IV CONTRAST Additional Contrast? None   Final Result      CHEST      1. Large multiloculated right pleural effusion. Recommend diagnostic and therapeutic thoracentesis. 2.  Complete atelectasis of the right upper lobe. Right upper lobe airway secretions. 3.  Compressive atelectasis in the right lower lobe and middle lobe. 4.  Ill-defined nodular opacities in left upper lobe likely infectious or inflammatory but nonspecific.   5.  Mild mediastinal and bilateral supraclavicular lymphadenopathy.       ABDOMEN AND PELVIS 1.  Cirrhotic liver with stigmata of portal hypertension including splenomegaly, mild ascites and esophageal and gastric varices. 2.  Jessica hepatis, celiac axis and retroperitoneal lymphadenopathy. 3.  Diffusely thickened bowel and stomach may represent sequela of portal hypertension versus infectious/inflammatory. 4.  Anasarca. 5.  Right inguinal hernia containing small bowel without evidence of complication. CT CHEST W CONTRAST   Final Result      CHEST      1. Large multiloculated right pleural effusion. Recommend diagnostic and therapeutic thoracentesis. 2.  Complete atelectasis of the right upper lobe. Right upper lobe airway secretions. 3.  Compressive atelectasis in the right lower lobe and middle lobe. 4.  Ill-defined nodular opacities in left upper lobe likely infectious or inflammatory but nonspecific.   5.  Mild mediastinal and bilateral supraclavicular lymphadenopathy. ABDOMEN AND PELVIS      1. Cirrhotic liver with stigmata of portal hypertension including splenomegaly, mild ascites and esophageal and gastric varices. 2.  Jessica hepatis, celiac axis and retroperitoneal lymphadenopathy. 3.  Diffusely thickened bowel and stomach may represent sequela of portal hypertension versus infectious/inflammatory. 4.  Anasarca. 5.  Right inguinal hernia containing small bowel without evidence of complication. CT HEAD WO CONTRAST   Final Result      1. No acute intracranial hemorrhage or mass effect. 2.  Sinus disease. XR CHEST (2 VW)   Final Result      Extensive right pleural-parenchymal opacities, which can be seen with pneumonia or malignancy.  CT chest with contrast.                  Assessment/Plan:     Acute hypoxic respiratory failure due to pneumococcal pneumonia complicated by bacteremia and suspected component of pulmonary edema in setting of multiple transfusions  - cont CTX 2g QD  - cont diuresis  - effusion tested and appears to be transudate  - repeat BCx pending  - give levofloxacin x7 days at discharge  - pulm and ID following    Decompensated cirrhosis  - new diagnosis, suspected related to HCV, prior ETOH use. - will require EGD for varices outpatient  - HCV treatment outpatient  - HBV+, DNA levels pending  - replace K prn    Bleeding diathesis  - primarily due to cirrhosis  - low fibrinogen, elevated INR, low plts. - no further oozing  - s/p multiple FFP transfusions  - if bleeds would give FFP and cryo  - DVT ppx held    Anasarca  - due to low protein state in setting of cirrhosis  - nephrology following  - continue lasix with spironolactone.      IV fentanyl use  - COWS protocol  - discussed withdrawal and risk of sensitization after his hospital stay    Hematuria  - f/u outpatient with urology  - monitor for recurrence    Code Status: full  FEN: low salt  PPX: SCDs  DISPO: pending improvement in o2 requirement    Linda Garcia MD, PGY-3  07/31/21  11:25 AM    This patient has will be staffed and discussed with Javi Franco MD.

## 2021-07-31 NOTE — PROGRESS NOTES
Patient stated that his niece was coming to visit. Patient still in covid isolation area, covid test is negative, however visitors are not allowed in the area. Patient stated that he was told he was allowed to have visitors. Writer/rn called charge RN Haley Mitchell who was not aware of this being approved. Haley Mitchell agreed to get patient moved to a room that he would be allowed to have a visitor. Room assigned on [address]. Patient appears agitated. Patient on COWS for withdraw, but only has clonidine ordered and BP has been soft. Paged residents to see if patient could have anything else on the COWS protocol.  Silvio Venegas RN

## 2021-08-01 LAB
A/G RATIO: 1.1 (ref 1.1–2.2)
ALBUMIN SERPL-MCNC: 3.2 G/DL (ref 3.4–5)
ALP BLD-CCNC: 85 U/L (ref 40–129)
ALT SERPL-CCNC: 15 U/L (ref 10–40)
ANION GAP SERPL CALCULATED.3IONS-SCNC: 7 MMOL/L (ref 3–16)
AST SERPL-CCNC: 43 U/L (ref 15–37)
BASOPHILS ABSOLUTE: 0.1 K/UL (ref 0–0.2)
BASOPHILS RELATIVE PERCENT: 0.5 %
BILIRUB SERPL-MCNC: 4.3 MG/DL (ref 0–1)
BUN BLDV-MCNC: 22 MG/DL (ref 7–20)
CALCIUM SERPL-MCNC: 8.1 MG/DL (ref 8.3–10.6)
CHLORIDE BLD-SCNC: 96 MMOL/L (ref 99–110)
CO2: 37 MMOL/L (ref 21–32)
CREAT SERPL-MCNC: <0.5 MG/DL (ref 0.9–1.3)
EOSINOPHILS ABSOLUTE: 0.4 K/UL (ref 0–0.6)
EOSINOPHILS RELATIVE PERCENT: 3.2 %
GFR AFRICAN AMERICAN: >60
GFR NON-AFRICAN AMERICAN: >60
GLOBULIN: 2.8 G/DL
GLUCOSE BLD-MCNC: 116 MG/DL (ref 70–99)
HCT VFR BLD CALC: 30.1 % (ref 40.5–52.5)
HEMOGLOBIN: 10.2 G/DL (ref 13.5–17.5)
HEPATITIS BE ANTIBODY: NEGATIVE
HEPATITIS BE ANTIGEN: POSITIVE
INR BLD: 1.89 (ref 0.88–1.12)
LYMPHOCYTES ABSOLUTE: 2.5 K/UL (ref 1–5.1)
LYMPHOCYTES RELATIVE PERCENT: 20.1 %
MAGNESIUM: 1.8 MG/DL (ref 1.8–2.4)
MCH RBC QN AUTO: 31.8 PG (ref 26–34)
MCHC RBC AUTO-ENTMCNC: 33.8 G/DL (ref 31–36)
MCV RBC AUTO: 94 FL (ref 80–100)
MONOCYTES ABSOLUTE: 0.9 K/UL (ref 0–1.3)
MONOCYTES RELATIVE PERCENT: 6.8 %
NEUTROPHILS ABSOLUTE: 8.7 K/UL (ref 1.7–7.7)
NEUTROPHILS RELATIVE PERCENT: 69.4 %
PDW BLD-RTO: 18.3 % (ref 12.4–15.4)
PLATELET # BLD: 78 K/UL (ref 135–450)
PMV BLD AUTO: 8.8 FL (ref 5–10.5)
POTASSIUM REFLEX MAGNESIUM: 3.3 MMOL/L (ref 3.5–5.1)
POTASSIUM SERPL-SCNC: 3.5 MMOL/L (ref 3.5–5.1)
PROTHROMBIN TIME: 21.9 SEC (ref 9.9–12.7)
RBC # BLD: 3.2 M/UL (ref 4.2–5.9)
SODIUM BLD-SCNC: 140 MMOL/L (ref 136–145)
TOTAL PROTEIN: 6 G/DL (ref 6.4–8.2)
WBC # BLD: 12.6 K/UL (ref 4–11)

## 2021-08-01 PROCEDURE — 94669 MECHANICAL CHEST WALL OSCILL: CPT

## 2021-08-01 PROCEDURE — 6370000000 HC RX 637 (ALT 250 FOR IP): Performed by: STUDENT IN AN ORGANIZED HEALTH CARE EDUCATION/TRAINING PROGRAM

## 2021-08-01 PROCEDURE — 94640 AIRWAY INHALATION TREATMENT: CPT

## 2021-08-01 PROCEDURE — 99233 SBSQ HOSP IP/OBS HIGH 50: CPT | Performed by: INTERNAL MEDICINE

## 2021-08-01 PROCEDURE — 6370000000 HC RX 637 (ALT 250 FOR IP)

## 2021-08-01 PROCEDURE — 83735 ASSAY OF MAGNESIUM: CPT

## 2021-08-01 PROCEDURE — 6360000002 HC RX W HCPCS: Performed by: INTERNAL MEDICINE

## 2021-08-01 PROCEDURE — 2580000003 HC RX 258: Performed by: INTERNAL MEDICINE

## 2021-08-01 PROCEDURE — 2580000003 HC RX 258: Performed by: STUDENT IN AN ORGANIZED HEALTH CARE EDUCATION/TRAINING PROGRAM

## 2021-08-01 PROCEDURE — 85025 COMPLETE CBC W/AUTO DIFF WBC: CPT

## 2021-08-01 PROCEDURE — 84132 ASSAY OF SERUM POTASSIUM: CPT

## 2021-08-01 PROCEDURE — 80053 COMPREHEN METABOLIC PANEL: CPT

## 2021-08-01 PROCEDURE — 6370000000 HC RX 637 (ALT 250 FOR IP): Performed by: INTERNAL MEDICINE

## 2021-08-01 PROCEDURE — 2060000000 HC ICU INTERMEDIATE R&B

## 2021-08-01 PROCEDURE — 85610 PROTHROMBIN TIME: CPT

## 2021-08-01 RX ORDER — LIDOCAINE 4 G/G
1 PATCH TOPICAL 2 TIMES DAILY
Status: DISCONTINUED | OUTPATIENT
Start: 2021-08-01 | End: 2021-08-02 | Stop reason: HOSPADM

## 2021-08-01 RX ORDER — FUROSEMIDE 10 MG/ML
60 INJECTION INTRAMUSCULAR; INTRAVENOUS 3 TIMES DAILY
Status: DISCONTINUED | OUTPATIENT
Start: 2021-08-01 | End: 2021-08-02

## 2021-08-01 RX ORDER — POTASSIUM CHLORIDE 20 MEQ/1
20 TABLET, EXTENDED RELEASE ORAL ONCE
Status: COMPLETED | OUTPATIENT
Start: 2021-08-01 | End: 2021-08-01

## 2021-08-01 RX ORDER — SPIRONOLACTONE 50 MG/1
100 TABLET, FILM COATED ORAL ONCE
Status: COMPLETED | OUTPATIENT
Start: 2021-08-01 | End: 2021-08-01

## 2021-08-01 RX ORDER — POTASSIUM CHLORIDE 20 MEQ/1
40 TABLET, EXTENDED RELEASE ORAL ONCE
Status: COMPLETED | OUTPATIENT
Start: 2021-08-01 | End: 2021-08-01

## 2021-08-01 RX ORDER — LANOLIN ALCOHOL/MO/W.PET/CERES
CREAM (GRAM) TOPICAL 2 TIMES DAILY
Status: DISCONTINUED | OUTPATIENT
Start: 2021-08-01 | End: 2021-08-02 | Stop reason: HOSPADM

## 2021-08-01 RX ADMIN — HYDROCORTISONE: 1 OINTMENT TOPICAL at 20:30

## 2021-08-01 RX ADMIN — CEFTRIAXONE SODIUM 2000 MG: 2 INJECTION, POWDER, FOR SOLUTION INTRAMUSCULAR; INTRAVENOUS at 13:31

## 2021-08-01 RX ADMIN — FUROSEMIDE 40 MG: 10 INJECTION, SOLUTION INTRAMUSCULAR; INTRAVENOUS at 14:28

## 2021-08-01 RX ADMIN — Medication: at 13:40

## 2021-08-01 RX ADMIN — ALBUTEROL SULFATE 2 PUFF: 90 AEROSOL, METERED RESPIRATORY (INHALATION) at 20:51

## 2021-08-01 RX ADMIN — Medication 10 ML: at 20:30

## 2021-08-01 RX ADMIN — Medication 10 ML: at 20:42

## 2021-08-01 RX ADMIN — ALBUTEROL SULFATE 2 PUFF: 90 AEROSOL, METERED RESPIRATORY (INHALATION) at 12:49

## 2021-08-01 RX ADMIN — Medication 10 ML: at 10:11

## 2021-08-01 RX ADMIN — POTASSIUM CHLORIDE 20 MEQ: 1500 TABLET, EXTENDED RELEASE ORAL at 23:36

## 2021-08-01 RX ADMIN — FUROSEMIDE 60 MG: 10 INJECTION, SOLUTION INTRAMUSCULAR; INTRAVENOUS at 20:31

## 2021-08-01 RX ADMIN — Medication: at 20:30

## 2021-08-01 RX ADMIN — Medication 10 ML: at 10:10

## 2021-08-01 RX ADMIN — SPIRONOLACTONE 100 MG: 50 TABLET ORAL at 10:10

## 2021-08-01 RX ADMIN — ALBUTEROL SULFATE 2 PUFF: 90 AEROSOL, METERED RESPIRATORY (INHALATION) at 16:40

## 2021-08-01 RX ADMIN — ALBUTEROL SULFATE 2 PUFF: 90 AEROSOL, METERED RESPIRATORY (INHALATION) at 08:26

## 2021-08-01 RX ADMIN — CLONIDINE HYDROCHLORIDE 0.1 MG: 0.1 TABLET ORAL at 04:46

## 2021-08-01 RX ADMIN — FUROSEMIDE 40 MG: 10 INJECTION, SOLUTION INTRAMUSCULAR; INTRAVENOUS at 10:11

## 2021-08-01 RX ADMIN — CLONIDINE HYDROCHLORIDE 0.1 MG: 0.1 TABLET ORAL at 20:30

## 2021-08-01 RX ADMIN — HYDROCORTISONE: 1 OINTMENT TOPICAL at 10:11

## 2021-08-01 RX ADMIN — POTASSIUM CHLORIDE 40 MEQ: 1500 TABLET, EXTENDED RELEASE ORAL at 10:11

## 2021-08-01 RX ADMIN — SPIRONOLACTONE 100 MG: 50 TABLET ORAL at 23:36

## 2021-08-01 ASSESSMENT — PAIN SCALES - WONG BAKER
WONGBAKER_NUMERICALRESPONSE: 0

## 2021-08-01 ASSESSMENT — PAIN DESCRIPTION - PROGRESSION
CLINICAL_PROGRESSION: NOT CHANGED

## 2021-08-01 ASSESSMENT — PAIN SCALES - GENERAL
PAINLEVEL_OUTOF10: 0
PAINLEVEL_OUTOF10: 9
PAINLEVEL_OUTOF10: 0

## 2021-08-01 ASSESSMENT — PAIN DESCRIPTION - FREQUENCY: FREQUENCY: INTERMITTENT

## 2021-08-01 ASSESSMENT — PAIN DESCRIPTION - LOCATION: LOCATION: STERNUM

## 2021-08-01 ASSESSMENT — PAIN DESCRIPTION - PAIN TYPE: TYPE: ACUTE PAIN

## 2021-08-01 ASSESSMENT — PAIN DESCRIPTION - DESCRIPTORS: DESCRIPTORS: SHARP

## 2021-08-01 ASSESSMENT — ENCOUNTER SYMPTOMS: ABDOMINAL DISTENTION: 1

## 2021-08-01 ASSESSMENT — PAIN - FUNCTIONAL ASSESSMENT: PAIN_FUNCTIONAL_ASSESSMENT: ACTIVITIES ARE NOT PREVENTED

## 2021-08-01 ASSESSMENT — PAIN DESCRIPTION - ONSET: ONSET: ON-GOING

## 2021-08-01 ASSESSMENT — PAIN DESCRIPTION - ORIENTATION: ORIENTATION: MID

## 2021-08-01 NOTE — PLAN OF CARE
Problem: Falls - Risk of:  Goal: Will remain free from falls  Description: Will remain free from falls  Outcome: Ongoing   Pt is a Fall Risk. See Altagracia Gibbons Fall Risk Score. Pt bed in low position and side rails up. Call light and belongings in reach. Pt encouraged to call for assistance. Will continue with hourly rounds for PO intake, pain needs, toileting, and repositioning as needed. Problem: Skin Integrity:  Goal: Absence of new skin breakdown  Description: Absence of new skin breakdown  Outcome: Ongoing  Noah skin assessment completed. Mepilex placed to left buttocks and LFA, wound consult in place, Will continue to monitor for skin integrity impairment. Problem: Infection:  Goal: Will remain free from infection  Description: Will remain free from infection  Outcome: Ongoing  Free from signs of infection at this time, afebrile, vitals stable, WBC trending down, continues on IV ATBs.   WBC   Date Value Ref Range Status   08/01/2021 12.6 (H) 4.0 - 11.0 K/uL Final

## 2021-08-01 NOTE — PLAN OF CARE
Problem: Falls - Risk of:  Goal: Will remain free from falls  Description: Will remain free from falls  8/1/2021 0026 by Theron Mittal RN  Outcome: Met This Shift  7/31/2021 1106 by Nery Evans RN  Outcome: Ongoing  Goal: Absence of physical injury  Description: Absence of physical injury  8/1/2021 0026 by Theron Mittal RN  Outcome: Met This Shift  7/31/2021 1106 by Nery Evans RN  Outcome: Ongoing     Problem: Skin Integrity:  Goal: Will show no infection signs and symptoms  Description: Will show no infection signs and symptoms  8/1/2021 0026 by Theron Mittal RN  Outcome: Ongoing  7/31/2021 1106 by Nery Evans RN  Outcome: Ongoing  Goal: Absence of new skin breakdown  Description: Absence of new skin breakdown  8/1/2021 0026 by Theron Mittal RN  Outcome: Met This Shift  7/31/2021 1106 by Nery Evans RN  Outcome: Ongoing     Problem: Pain:  Goal: Pain level will decrease  Description: Pain level will decrease  8/1/2021 0026 by Theron Mittal RN  Outcome: Met This Shift  7/31/2021 1106 by Nery Evans RN  Outcome: Ongoing  Goal: Control of acute pain  Description: Control of acute pain  8/1/2021 0026 by Theron Mittal RN  Outcome: Met This Shift  7/31/2021 1106 by Nery Evans RN  Outcome: Ongoing  Goal: Control of chronic pain  Description: Control of chronic pain  8/1/2021 0026 by Theron Mittal RN  Outcome: Met This Shift  7/31/2021 1106 by Nery Evans RN  Outcome: Ongoing  Goal: Patient's pain/discomfort is manageable  Description: Patient's pain/discomfort is manageable  8/1/2021 0026 by Theron Mittal RN  Outcome: Met This Shift  7/31/2021 1106 by Nery Evans RN  Outcome: Ongoing     Problem: Infection:  Goal: Will remain free from infection  Description: Will remain free from infection  8/1/2021 0026 by Theron Mittal RN  Outcome: Ongoing  7/31/2021 1106 by Nery Evans RN  Outcome: Ongoing Problem: Safety:  Goal: Free from accidental physical injury  Description: Free from accidental physical injury  8/1/2021 0026 by Ashley Pate RN  Outcome: Met This Shift  7/31/2021 1106 by Xavier Gonzales RN  Outcome: Ongoing  Goal: Free from intentional harm  Description: Free from intentional harm  8/1/2021 0026 by Ashley Pate RN  Outcome: Met This Shift  7/31/2021 1106 by Xavier Gonzales RN  Outcome: Ongoing     Problem: Discharge Planning:  Goal: Patients continuum of care needs are met  Description: Patients continuum of care needs are met  8/1/2021 0026 by Ashley Pate RN  Outcome: Not Met This Shift  7/31/2021 1106 by Xavier Gonzales RN  Outcome: Ongoing     Problem: Daily Care:  Goal: Daily care needs are met  Description: Daily care needs are met  8/1/2021 0026 by Ashley Pate RN  Outcome: Not Met This Shift  7/31/2021 1106 by Xavier Gonzales RN  Outcome: Ongoing     Problem: Cardiac:  Goal: Hemodynamic stability will improve  Description: Hemodynamic stability will improve  8/1/2021 0026 by Ashley Pate RN  Outcome: Ongoing  7/31/2021 1106 by Xavier Gonzales RN  Outcome: Ongoing

## 2021-08-01 NOTE — PROGRESS NOTES
Extraocular movements intact. Conjunctiva/sclera: Conjunctivae normal.      Pupils: Pupils are equal, round, and reactive to light. Cardiovascular:      Rate and Rhythm: Normal rate and regular rhythm. Pulses: Normal pulses. Heart sounds: Normal heart sounds. Pulmonary:      Effort: Pulmonary effort is normal.      Breath sounds: Normal breath sounds. Abdominal:      General: Bowel sounds are normal. There is distension. Palpations: Abdomen is soft. Musculoskeletal:      Cervical back: Normal range of motion. Neurological:      General: No focal deficit present. Mental Status: He is alert and oriented to person, place, and time. Mental status is at baseline. Psychiatric:         Mood and Affect: Mood normal.         Behavior: Behavior normal.         Thought Content: Thought content normal.         Judgment: Judgment normal.         LABS:    CBC:   Recent Labs     07/30/21 0500 07/31/21 0430 08/01/21 0452   WBC 8.9 9.4 12.6*   HGB 9.6* 9.6* 10.2*   HCT 28.5* 28.4* 30.1*   PLT 60* 69* 78*   MCV 93.2 93.4 94.0     Renal:    Recent Labs     07/30/21 0500 07/31/21 0430 08/01/21 0452    141 140   K 3.0* 3.5 3.3*   CL 95* 97* 96*   CO2 38* 37* 37*   BUN 16 18 22*   CREATININE <0.5* <0.5* <0.5*   GLUCOSE 103* 101* 116*   CALCIUM 8.1* 8.0* 8.1*   MG 1.90 1.90 1.80   ANIONGAP 6 7 7     Hepatic:   Recent Labs     07/30/21 0500 07/31/21 0430 08/01/21 0452   AST 35 40* 43*   ALT 14 14 15   BILITOT 4.3* 4.1* 4.3*   PROT 5.7* 5.6* 6.0*   LABALBU 3.4 3.0* 3.2*   ALKPHOS 66 68 85     Troponin: No results for input(s): TROPONINI in the last 72 hours. BNP: No results for input(s): BNP in the last 72 hours. Lipids: No results for input(s): CHOL, HDL in the last 72 hours.     Invalid input(s): LDLCALCU, TRIGLYCERIDE  ABGs:    Recent Labs     07/31/21  1637   PHART 7.375   UGX1PLY 54.6*   PO2ART 61.2*   NLA1YJI 31.9*   BEART 7*   X7XTNPKU 90*   TKC0CFL 34       INR:   Recent Labs 07/30/21  0500 07/31/21  0430   INR 2.27* 2.11*     Lactate:   Recent Labs     07/31/21  1637   LACTATE 9.19*     Cultures:  -----------------------------------------------------------------  RAD:   CT CHEST WO CONTRAST   Final Result      1. Unchanged large multiloculated right pleural effusion and new small left pleural effusion. 2.  Worsening opacities involving both lungs. Differential considerations include infection, pulmonary edema, pneumonitis, diffuse alveolar damage, and hemorrhage. 3.  Cervical, thoracic, and upper abdominal lymphadenopathy persists. This may be reactive, however metastatic disease and lymphoma can't be excluded. 4.  Unchanged cirrhosis with sequela of portal hypertension. XR CHEST PORTABLE   Final Result      Complete opacification of the right hemithorax. CT GUIDED THORACENTESIS   Final Result      Uneventful CT-guided right thoracentesis      XR CHEST PORTABLE   Final Result      Worsening infiltrates. CT ABDOMEN PELVIS W IV CONTRAST Additional Contrast? None   Final Result      CHEST      1. Large multiloculated right pleural effusion. Recommend diagnostic and therapeutic thoracentesis. 2.  Complete atelectasis of the right upper lobe. Right upper lobe airway secretions. 3.  Compressive atelectasis in the right lower lobe and middle lobe. 4.  Ill-defined nodular opacities in left upper lobe likely infectious or inflammatory but nonspecific.   5.  Mild mediastinal and bilateral supraclavicular lymphadenopathy. ABDOMEN AND PELVIS      1. Cirrhotic liver with stigmata of portal hypertension including splenomegaly, mild ascites and esophageal and gastric varices. 2.  Jessica hepatis, celiac axis and retroperitoneal lymphadenopathy. 3.  Diffusely thickened bowel and stomach may represent sequela of portal hypertension versus infectious/inflammatory. 4.  Anasarca. 5.  Right inguinal hernia containing small bowel without evidence of complication. CT CHEST W CONTRAST   Final Result      CHEST      1. Large multiloculated right pleural effusion. Recommend diagnostic and therapeutic thoracentesis. 2.  Complete atelectasis of the right upper lobe. Right upper lobe airway secretions. 3.  Compressive atelectasis in the right lower lobe and middle lobe. 4.  Ill-defined nodular opacities in left upper lobe likely infectious or inflammatory but nonspecific.   5.  Mild mediastinal and bilateral supraclavicular lymphadenopathy. ABDOMEN AND PELVIS      1. Cirrhotic liver with stigmata of portal hypertension including splenomegaly, mild ascites and esophageal and gastric varices. 2.  Jessica hepatis, celiac axis and retroperitoneal lymphadenopathy. 3.  Diffusely thickened bowel and stomach may represent sequela of portal hypertension versus infectious/inflammatory. 4.  Anasarca. 5.  Right inguinal hernia containing small bowel without evidence of complication. CT HEAD WO CONTRAST   Final Result      1. No acute intracranial hemorrhage or mass effect. 2.  Sinus disease. XR CHEST (2 VW)   Final Result      Extensive right pleural-parenchymal opacities, which can be seen with pneumonia or malignancy. CT chest with contrast.                Assessment/Plan:      Acute hypoxic respiratory failure due to pneumococcal pneumonia complicated by bacteremia and suspected component of pulmonary edema in setting of multiple transfusions  - cont CTX 2g QD  - resume diuresis with lasix and aldactone   - effusion tested and appears to be transudate  - repeat BCx pending  - give levofloxacin x7 days at discharge  - pulm and ID following     Decompensated cirrhosis  - new diagnosis, suspected related to HCV, prior ETOH use. - will require EGD for varices outpatient  - HCV treatment outpatient  - HBV+, DNA levels pending  - replace K prn     Bleeding diathesis  - primarily due to cirrhosis  - low fibrinogen, elevated INR, low plts.    - no further oozing  - s/p multiple FFP transfusions  - if bleeds would give FFP and cryo  - DVT ppx held     Anasarca  - due to low protein state in setting of cirrhosis  - nephrology following  - continue lasix with spironolactone.      IV fentanyl use  - COWS protocol  - discussed withdrawal and risk of sensitization after his hospital stay     Hematuria  - f/u outpatient with urology  - monitor for recurrence       Code Status: full   FEN: low salt  PPX: SCDs  DISPO:  pending improvement in o2 requirement    Carlos A Funes MD, PGY-1   08/01/21  9:35 AM    This patient has been staffed and discussed with Sakina Verdin MD.

## 2021-08-01 NOTE — PROGRESS NOTES
Nephrology Note                                                                                                                                                                                                                                                                                                                                                               Office : [phone]     Fax :[phone]              Patient's Name: Benton Barrera  5:43 PM  8/1/2021    Reason for Consult:  Wilfridyennifer           HAYLEYo good   Edema better   On lasix        reports that he has been smoking cigarettes. He has been smoking about 1.00 pack per day. He has never used smokeless tobacco. He reports current drug use. Drug: IV. He reports that he does not drink alcohol. Allergies:  Patient has no known allergies.     Current Medications:    lidocaine 4 % external patch 1 patch, BID  Hydrocerin cream CREA, BID  furosemide (LASIX) injection 40 mg, TID  hydrocortisone 1 % ointment, BID  albuterol sulfate  (90 Base) MCG/ACT inhaler 2 puff, 4x daily  cefTRIAXone (ROCEPHIN) 2000 mg IVPB in D5W 50ml minibag, Q24H  albuterol (PROVENTIL) nebulizer solution 2.5 mg, Q4H PRN  sodium chloride (Inhalant) 3 % nebulizer solution 4 mL, PRN  sodium chloride flush 0.9 % injection 5-40 mL, 2 times per day  sodium chloride flush 0.9 % injection 5-40 mL, PRN  0.9 % sodium chloride infusion, PRN  ondansetron (ZOFRAN-ODT) disintegrating tablet 4 mg, Q8H PRN   Or  ondansetron (ZOFRAN) injection 4 mg, Q6H PRN  spironolactone (ALDACTONE) tablet 100 mg, Daily  sodium chloride flush 0.9 % injection 5-40 mL, 2 times per day  sodium chloride flush 0.9 % injection 5-40 mL, PRN  0.9 % sodium chloride infusion, PRN  cloNIDine (CATAPRES) tablet 0.1 mg, PRN          Physical exam:     Vitals:  /69   Pulse 90   Temp 97.8 °F (36.6 °C) (Oral)   Resp 20   Ht 5' 10\" (1.778 m)   Wt 198 lb 3.1 oz (89.9 kg)   SpO2 91%   BMI 28.44 kg/m²   Constitutional: AA   Skin: no rash, turgor wnl  Heent:  eomi, mmm  Neck: no bruits or jvd noted  Cardiovascular:  S1, S2 without m/r/g  Respiratory: CTA B without w/r/r  Abdomen:  +bs, soft, nt, nd  Ext: + lower extremity edema  Psychiatric: mood and affect appropriate  Musculoskeletal:  Rom, muscular strength intact    Data:   Labs:  CBC:   Recent Labs     07/30/21  0500 07/31/21 0430 08/01/21 0452   WBC 8.9 9.4 12.6*   HGB 9.6* 9.6* 10.2*   PLT 60* 69* 78*     BMP:    Recent Labs     07/30/21  0500 07/30/21  0500 07/31/21 0430 08/01/21 0452 08/01/21  1336     --  141 140  --    K 3.0*   < > 3.5 3.3* 3.5   CL 95*  --  97* 96*  --    CO2 38*  --  37* 37*  --    BUN 16  --  18 22*  --    CREATININE <0.5*  --  <0.5* <0.5*  --    GLUCOSE 103*  --  101* 116*  --     < > = values in this interval not displayed. Ca/Mg/Phos:   Recent Labs     07/30/21 0500 07/31/21 0430 08/01/21 0452   CALCIUM 8.1* 8.0* 8.1*   MG 1.90 1.90 1.80     Hepatic:   Recent Labs     07/30/21 0500 07/31/21 0430 08/01/21 0452   AST 35 40* 43*   ALT 14 14 15   BILITOT 4.3* 4.1* 4.3*   ALKPHOS 66 68 85     Troponin:   No results for input(s): TROPONINI in the last 72 hours. BNP: No results for input(s): BNP in the last 72 hours. Lipids: No results for input(s): CHOL, TRIG, HDL, LDLCALC, LABVLDL in the last 72 hours. ABGs:   Recent Labs     07/31/21  1637   PHART 7.375   PO2ART 61.2*   ABX1TOY 54.6*     INR:   Recent Labs     07/30/21  0500 07/31/21 0430 08/01/21 0452   INR 2.27* 2.11* 1.89*     UA:  Recent Labs     07/31/21  1852   PHUR 5.5      Urine Microscopic:   No results for input(s): LABCAST, BACTERIA, COMU, HYALCAST, WBCUA, RBCUA, EPIU in the last 72 hours. Urine Culture: No results for input(s): LABURIN in the last 72 hours. Urine Chemistry:   No results for input(s): [place], PROTEINUR, NAUR in the last 72 hours. IMAGING:  CT CHEST WO CONTRAST   Final Result      1.    Unchanged large multiloculated right pleural right lower lobe and middle lobe. 4.  Ill-defined nodular opacities in left upper lobe likely infectious or inflammatory but nonspecific.   5.  Mild mediastinal and bilateral supraclavicular lymphadenopathy. ABDOMEN AND PELVIS      1. Cirrhotic liver with stigmata of portal hypertension including splenomegaly, mild ascites and esophageal and gastric varices. 2.  Jessica hepatis, celiac axis and retroperitoneal lymphadenopathy. 3.  Diffusely thickened bowel and stomach may represent sequela of portal hypertension versus infectious/inflammatory. 4.  Anasarca. 5.  Right inguinal hernia containing small bowel without evidence of complication. CT HEAD WO CONTRAST   Final Result      1. No acute intracranial hemorrhage or mass effect. 2.  Sinus disease. XR CHEST (2 VW)   Final Result      Extensive right pleural-parenchymal opacities, which can be seen with pneumonia or malignancy. CT chest with contrast.                Assessment/Plan   1. Cirrhosis     2. Anasarca     3. Anemia    4. Acid- base/ Electrolyte imbalance     5.  Hep C     Plan   - aldactone   - Albumin + lasix  - Ur studies - reviewed   - monitor UO and renal function   - labs in am   - Daily wts   - No NSAIDS   - Paracentesis                 Thank you for allowing us to participate in care of Leopoldo Antu, MD  Feel free to contact me   [facility] [address]  Office : [phone]  Fax :[phone]

## 2021-08-01 NOTE — PROGRESS NOTES
Holding nighttime Lasix and one time dose of Spirolactone, per Dr. Kathryn Woodruff. Will let residents know if pt O2 sats decrease or pt seems to have increased swelling. Pt to be transferred to [facility].

## 2021-08-02 VITALS
TEMPERATURE: 97.6 F | RESPIRATION RATE: 19 BRPM | OXYGEN SATURATION: 94 % | BODY MASS INDEX: 28.06 KG/M2 | WEIGHT: 195.99 LBS | HEART RATE: 84 BPM | HEIGHT: 70 IN | DIASTOLIC BLOOD PRESSURE: 72 MMHG | SYSTOLIC BLOOD PRESSURE: 130 MMHG

## 2021-08-02 LAB
A/G RATIO: 1.1 (ref 1.1–2.2)
ALBUMIN SERPL-MCNC: 3 G/DL (ref 3.4–5)
ALP BLD-CCNC: 81 U/L (ref 40–129)
ALT SERPL-CCNC: 18 U/L (ref 10–40)
ANION GAP SERPL CALCULATED.3IONS-SCNC: 4 MMOL/L (ref 3–16)
AST SERPL-CCNC: 54 U/L (ref 15–37)
BASOPHILS ABSOLUTE: 0.1 K/UL (ref 0–0.2)
BASOPHILS RELATIVE PERCENT: 0.7 %
BILIRUB SERPL-MCNC: 5.4 MG/DL (ref 0–1)
BLOOD CULTURE, ROUTINE: NORMAL
BUN BLDV-MCNC: 20 MG/DL (ref 7–20)
CALCIUM SERPL-MCNC: 8.3 MG/DL (ref 8.3–10.6)
CHLORIDE BLD-SCNC: 98 MMOL/L (ref 99–110)
CO2: 38 MMOL/L (ref 21–32)
CREAT SERPL-MCNC: <0.5 MG/DL (ref 0.9–1.3)
CULTURE, BLOOD 2: NORMAL
EOSINOPHILS ABSOLUTE: 0.4 K/UL (ref 0–0.6)
EOSINOPHILS RELATIVE PERCENT: 3.6 %
GFR AFRICAN AMERICAN: >60
GFR NON-AFRICAN AMERICAN: >60
GLOBULIN: 2.8 G/DL
GLUCOSE BLD-MCNC: 90 MG/DL (ref 70–99)
HBV QNT LOG, IU/ML: 8.42 LOG IU/ML
HBV QNT LOG, IU/ML: 8.59 LOG IU/ML
HBV QNT, IU/ML: ABNORMAL IU/ML
HBV QNT, IU/ML: ABNORMAL IU/ML
HCT VFR BLD CALC: 29.4 % (ref 40.5–52.5)
HEMOGLOBIN: 10 G/DL (ref 13.5–17.5)
INR BLD: 1.88 (ref 0.88–1.12)
INTERPRETATION: DETECTED
INTERPRETATION: DETECTED
LYMPHOCYTES ABSOLUTE: 2.2 K/UL (ref 1–5.1)
LYMPHOCYTES RELATIVE PERCENT: 19.2 %
MCH RBC QN AUTO: 32.4 PG (ref 26–34)
MCHC RBC AUTO-ENTMCNC: 34 G/DL (ref 31–36)
MCV RBC AUTO: 95.4 FL (ref 80–100)
MONOCYTES ABSOLUTE: 0.8 K/UL (ref 0–1.3)
MONOCYTES RELATIVE PERCENT: 7.1 %
NEUTROPHILS ABSOLUTE: 8.1 K/UL (ref 1.7–7.7)
NEUTROPHILS RELATIVE PERCENT: 69.4 %
PDW BLD-RTO: 18.2 % (ref 12.4–15.4)
PLATELET # BLD: 75 K/UL (ref 135–450)
PMV BLD AUTO: 8.9 FL (ref 5–10.5)
POTASSIUM REFLEX MAGNESIUM: 3.9 MMOL/L (ref 3.5–5.1)
PROTHROMBIN TIME: 21.8 SEC (ref 9.9–12.7)
RBC # BLD: 3.08 M/UL (ref 4.2–5.9)
SODIUM BLD-SCNC: 140 MMOL/L (ref 136–145)
TOTAL PROTEIN: 5.8 G/DL (ref 6.4–8.2)
WBC # BLD: 11.7 K/UL (ref 4–11)

## 2021-08-02 PROCEDURE — 6370000000 HC RX 637 (ALT 250 FOR IP): Performed by: STUDENT IN AN ORGANIZED HEALTH CARE EDUCATION/TRAINING PROGRAM

## 2021-08-02 PROCEDURE — 99233 SBSQ HOSP IP/OBS HIGH 50: CPT | Performed by: INTERNAL MEDICINE

## 2021-08-02 PROCEDURE — 97166 OT EVAL MOD COMPLEX 45 MIN: CPT

## 2021-08-02 PROCEDURE — 2580000003 HC RX 258: Performed by: STUDENT IN AN ORGANIZED HEALTH CARE EDUCATION/TRAINING PROGRAM

## 2021-08-02 PROCEDURE — 97530 THERAPEUTIC ACTIVITIES: CPT

## 2021-08-02 PROCEDURE — 99232 SBSQ HOSP IP/OBS MODERATE 35: CPT | Performed by: INTERNAL MEDICINE

## 2021-08-02 PROCEDURE — 97535 SELF CARE MNGMENT TRAINING: CPT

## 2021-08-02 PROCEDURE — 94761 N-INVAS EAR/PLS OXIMETRY MLT: CPT

## 2021-08-02 PROCEDURE — 80053 COMPREHEN METABOLIC PANEL: CPT

## 2021-08-02 PROCEDURE — 85025 COMPLETE CBC W/AUTO DIFF WBC: CPT

## 2021-08-02 PROCEDURE — 94640 AIRWAY INHALATION TREATMENT: CPT

## 2021-08-02 PROCEDURE — 2700000000 HC OXYGEN THERAPY PER DAY

## 2021-08-02 PROCEDURE — 85610 PROTHROMBIN TIME: CPT

## 2021-08-02 PROCEDURE — 2580000003 HC RX 258: Performed by: INTERNAL MEDICINE

## 2021-08-02 PROCEDURE — 6360000002 HC RX W HCPCS: Performed by: INTERNAL MEDICINE

## 2021-08-02 PROCEDURE — 97161 PT EVAL LOW COMPLEX 20 MIN: CPT

## 2021-08-02 PROCEDURE — 94669 MECHANICAL CHEST WALL OSCILL: CPT

## 2021-08-02 RX ORDER — LEVOFLOXACIN 750 MG/1
750 TABLET ORAL DAILY
Qty: 5 TABLET | Refills: 0 | Status: SHIPPED | OUTPATIENT
Start: 2021-08-02 | End: 2021-08-07

## 2021-08-02 RX ORDER — SPIRONOLACTONE 100 MG/1
100 TABLET, FILM COATED ORAL DAILY
Qty: 30 TABLET | Refills: 3 | Status: ON HOLD | OUTPATIENT
Start: 2021-08-02 | End: 2021-09-10 | Stop reason: SDUPTHER

## 2021-08-02 RX ORDER — TORSEMIDE 100 MG/1
50 TABLET ORAL DAILY
Qty: 30 TABLET | Refills: 3 | Status: ON HOLD | OUTPATIENT
Start: 2021-08-02 | End: 2021-09-10 | Stop reason: SDUPTHER

## 2021-08-02 RX ORDER — LANOLIN ALCOHOL/MO/W.PET/CERES
CREAM (GRAM) TOPICAL 2 TIMES DAILY
Qty: 1 CONTAINER | Refills: 0 | Status: SHIPPED | OUTPATIENT
Start: 2021-08-02

## 2021-08-02 RX ORDER — SPIRONOLACTONE 100 MG/1
100 TABLET, FILM COATED ORAL DAILY
Qty: 30 TABLET | Refills: 3 | Status: SHIPPED | OUTPATIENT
Start: 2021-08-02 | End: 2021-08-02

## 2021-08-02 RX ORDER — DIAPER,BRIEF,INFANT-TODD,DISP
EACH MISCELLANEOUS
Qty: 30 G | Refills: 0 | Status: SHIPPED | OUTPATIENT
Start: 2021-08-02 | End: 2021-08-19

## 2021-08-02 RX ORDER — ONDANSETRON 4 MG/1
4 TABLET, ORALLY DISINTEGRATING ORAL EVERY 12 HOURS PRN
Qty: 10 TABLET | Refills: 0 | Status: SHIPPED | OUTPATIENT
Start: 2021-08-02 | End: 2021-08-05

## 2021-08-02 RX ORDER — FUROSEMIDE 40 MG/1
40 TABLET ORAL DAILY
Qty: 90 TABLET | Refills: 1 | Status: SHIPPED | OUTPATIENT
Start: 2021-08-02 | End: 2021-08-02 | Stop reason: HOSPADM

## 2021-08-02 RX ORDER — FUROSEMIDE 10 MG/ML
80 INJECTION INTRAMUSCULAR; INTRAVENOUS 3 TIMES DAILY
Status: DISCONTINUED | OUTPATIENT
Start: 2021-08-02 | End: 2021-08-02 | Stop reason: HOSPADM

## 2021-08-02 RX ORDER — FUROSEMIDE 40 MG/1
40 TABLET ORAL DAILY
Qty: 90 TABLET | Refills: 1 | Status: SHIPPED | OUTPATIENT
Start: 2021-08-02 | End: 2021-08-02 | Stop reason: SDUPTHER

## 2021-08-02 RX ORDER — LEVOFLOXACIN 750 MG/1
750 TABLET ORAL DAILY
Qty: 5 TABLET | Refills: 0 | Status: SHIPPED | OUTPATIENT
Start: 2021-08-02 | End: 2021-08-02 | Stop reason: SDUPTHER

## 2021-08-02 RX ORDER — ALBUTEROL SULFATE 90 UG/1
2 AEROSOL, METERED RESPIRATORY (INHALATION) 4 TIMES DAILY
Qty: 1 INHALER | Refills: 3 | Status: SHIPPED | OUTPATIENT
Start: 2021-08-02

## 2021-08-02 RX ADMIN — FUROSEMIDE 60 MG: 10 INJECTION, SOLUTION INTRAMUSCULAR; INTRAVENOUS at 08:36

## 2021-08-02 RX ADMIN — ALBUTEROL SULFATE 2 PUFF: 90 AEROSOL, METERED RESPIRATORY (INHALATION) at 11:38

## 2021-08-02 RX ADMIN — SPIRONOLACTONE 100 MG: 50 TABLET ORAL at 08:38

## 2021-08-02 RX ADMIN — CEFTRIAXONE SODIUM 2000 MG: 2 INJECTION, POWDER, FOR SOLUTION INTRAMUSCULAR; INTRAVENOUS at 11:21

## 2021-08-02 RX ADMIN — ALBUTEROL SULFATE 2 PUFF: 90 AEROSOL, METERED RESPIRATORY (INHALATION) at 07:55

## 2021-08-02 RX ADMIN — Medication 30 ML: at 08:48

## 2021-08-02 RX ADMIN — ALBUTEROL SULFATE 2 PUFF: 90 AEROSOL, METERED RESPIRATORY (INHALATION) at 15:54

## 2021-08-02 RX ADMIN — Medication: at 08:38

## 2021-08-02 RX ADMIN — HYDROCORTISONE: 1 OINTMENT TOPICAL at 08:38

## 2021-08-02 ASSESSMENT — PAIN SCALES - WONG BAKER
WONGBAKER_NUMERICALRESPONSE: 0

## 2021-08-02 ASSESSMENT — PAIN DESCRIPTION - PAIN TYPE
TYPE: ACUTE PAIN
TYPE: ACUTE PAIN

## 2021-08-02 ASSESSMENT — PAIN SCALES - GENERAL
PAINLEVEL_OUTOF10: 7
PAINLEVEL_OUTOF10: 0
PAINLEVEL_OUTOF10: 7
PAINLEVEL_OUTOF10: 8
PAINLEVEL_OUTOF10: 0
PAINLEVEL_OUTOF10: 0
PAINLEVEL_OUTOF10: 7

## 2021-08-02 ASSESSMENT — PAIN DESCRIPTION - ONSET
ONSET: ON-GOING
ONSET: ON-GOING

## 2021-08-02 ASSESSMENT — PAIN DESCRIPTION - FREQUENCY
FREQUENCY: INTERMITTENT
FREQUENCY: INTERMITTENT

## 2021-08-02 ASSESSMENT — PAIN - FUNCTIONAL ASSESSMENT: PAIN_FUNCTIONAL_ASSESSMENT: ACTIVITIES ARE NOT PREVENTED

## 2021-08-02 ASSESSMENT — PAIN DESCRIPTION - LOCATION
LOCATION: STERNUM
LOCATION: STERNUM

## 2021-08-02 ASSESSMENT — PAIN DESCRIPTION - PROGRESSION: CLINICAL_PROGRESSION: NOT CHANGED

## 2021-08-02 ASSESSMENT — PAIN DESCRIPTION - ORIENTATION: ORIENTATION: MID

## 2021-08-02 ASSESSMENT — PAIN DESCRIPTION - DESCRIPTORS
DESCRIPTORS: ACHING;SHARP
DESCRIPTORS: ACHING;SHARP

## 2021-08-02 NOTE — PROGRESS NOTES
[facility]    Respiratory Therapy     Home Oxygen Evaluation        Name: Anahy Bee Record Number: [id number]  Age: 45 y.o. Gender:  male   : 1970  Today's date: 2021  Room: [address] [place] [address]      Assessment        /72   Pulse 84   Temp 97.6 °F (36.4 °C) (Oral)   Resp 19   Ht 5' 10\" (1.778 m)   Wt 195 lb 15.8 oz (88.9 kg)   SpO2 93%   BMI 28.12 kg/m²     Patient Active Problem List   Diagnosis    Cirrhosis (HCC)    Pleural effusion    Hepatitis C virus infection without hepatic coma    Hypoxia    Pneumococcal bacteremia       Social History:  Social History     Tobacco Use    Smoking status: Current Every Day Smoker     Packs/day: 1.00     Types: Cigarettes    Smokeless tobacco: Never Used   Substance Use Topics    Alcohol use: Never    Drug use: Yes     Types: IV     Comment: fentanyl       Patient Room Air saturation at rest 87  %  Patient Room Air saturation upon ambulation 82 %    Oxygen saturations of 88% or less on RA qualifies patient for Home Oxygen    Patient resting on 0  lmp  with an oxygen saturation of  87 %     Patient ambulated on 0 lpm with an oxygen saturation of 81%    Patient ambulated on 1 lpm with an oxygen saturation of 82%    Patient ambulated on 2 lpm with an oxygen saturation of 83%     Patient ambulated on 3 lpm with an oxygen saturation of 85%    Patient ambulated on 4 lpm with an oxygen saturation of 86%     Patient ambulated on 5 lpm with an oxygen saturation of 87%    Patient ambulated on 6 lpm with an oxygen saturation of 89%       Qualifying patient for home oxygen with ambulation and continuous flow  @ 6 lpm.      In your clinical assessment does the Patient Require Portable Oxygen Tanks?     Yes               Patient/caregiver was educated on Home Oxygen process:  Yes      Level of patient/caregiver understanding able to:   [x] Verbalize understanding   [] Demonstrate understanding       [] Teach back        [] Needs reinforcement

## 2021-08-02 NOTE — PROGRESS NOTES
Progress Note    Admit Date: 2021  Diet: ADULT DIET; Regular; Low Sodium (2 gm)    CC: Abdominal pain associated with nausea and vomiting    Interval history: Patient seen and examined at bedside. This a.m. patient appears to be much more comfortable. Reports feeling good. Denies any shortness of breath, chest pain, palpitations. Satting at 93% on 5 L oxygen. Medications:     Scheduled Meds:   lidocaine  1 patch Transdermal BID    Hydrocerin   Topical BID    furosemide  60 mg Intravenous TID    hydrocortisone   Topical BID    albuterol sulfate HFA  2 puff Inhalation 4x daily    cefTRIAXone (ROCEPHIN) IV  2,000 mg Intravenous Q24H    sodium chloride flush  5-40 mL Intravenous 2 times per day    spironolactone  100 mg Oral Daily    sodium chloride flush  5-40 mL Intravenous 2 times per day     Continuous Infusions:   sodium chloride      sodium chloride       PRN Meds:albuterol, sodium chloride (Inhalant), sodium chloride flush, sodium chloride, ondansetron **OR** ondansetron, sodium chloride flush, sodium chloride, [] traMADol **AND** cloNIDine    Objective:   Vitals:   T-max:  Patient Vitals for the past 8 hrs:   BP Temp Temp src Pulse Resp SpO2 Weight   21 0826 118/75 97.3 °F (36.3 °C) Oral 86 18 92 %    21 0755      94 %    21 0426       195 lb 15.8 oz (88.9 kg)   21 0345 108/70 97.7 °F (36.5 °C) Axillary 83 18 96 % 195 lb 15.8 oz (88.9 kg)       Intake/Output Summary (Last 24 hours) at 2021 0651  Last data filed at 2021 0500  Gross per 24 hour   Intake 1070 ml   Output 2360 ml   Net -1290 ml         Physical Exam  Constitutional:       Appearance: He is obese. HENT:      Head: Normocephalic and atraumatic. Nose: Nose normal.      Mouth/Throat:      Mouth: Mucous membranes are moist.      Pharynx: Oropharynx is clear. Eyes:      Extraocular Movements: Extraocular movements intact.       Conjunctiva/sclera: Conjunctivae normal. Pupils: Pupils are equal, round, and reactive to light. Cardiovascular:      Rate and Rhythm: Normal rate and regular rhythm. Pulmonary:      Effort: Pulmonary effort is normal.      Breath sounds: Wheezing present. Comments: On 5 L nasal cannula. Bilateral diffuse crackles  Abdominal:      General: Bowel sounds are normal. There is distension. Palpations: Abdomen is soft. Tenderness: There is abdominal tenderness. Musculoskeletal:         General: Normal range of motion. Cervical back: Normal range of motion. Skin:     General: Skin is warm. Comments: Bilateral lower extremity well demarcated thickened skin with cobblestone appearance likely due to chronic venous congestion- improving   Neurological:      General: No focal deficit present. Mental Status: He is alert and oriented to person, place, and time. Mental status is at baseline. Psychiatric:         Mood and Affect: Mood normal.         Behavior: Behavior normal.         Thought Content: Thought content normal.         Judgment: Judgment normal.         LABS:    CBC:   Recent Labs     07/31/21 0430 08/01/21 0452 08/02/21  0415   WBC 9.4 12.6* 11.7*   HGB 9.6* 10.2* 10.0*   HCT 28.4* 30.1* 29.4*   PLT 69* 78* 75*   MCV 93.4 94.0 95.4     Renal:    Recent Labs     07/31/21 0430 07/31/21 0430 08/01/21 0452 08/01/21  1336 08/02/21  0415     --  140  --  140   K 3.5   < > 3.3* 3.5 3.9   CL 97*  --  96*  --  98*   CO2 37*  --  37*  --  38*   BUN 18  --  22*  --  20   CREATININE <0.5*  --  <0.5*  --  <0.5*   GLUCOSE 101*  --  116*  --  90   CALCIUM 8.0*  --  8.1*  --  8.3   MG 1.90  --  1.80  --   --    ANIONGAP 7  --  7  --  4    < > = values in this interval not displayed.      Hepatic:   Recent Labs     07/31/21 0430 08/01/21 0452 08/02/21  0415   AST 40* 43* 54*   ALT 14 15 18   BILITOT 4.1* 4.3* 5.4*   PROT 5.6* 6.0* 5.8*   LABALBU 3.0* 3.2* 3.0*   ALKPHOS 68 85 81     Troponin: No results for input(s): TROPONINI in the last 72 hours. BNP: No results for input(s): BNP in the last 72 hours. Lipids: No results for input(s): CHOL, HDL in the last 72 hours. Invalid input(s): LDLCALCU, TRIGLYCERIDE  ABGs:    Recent Labs     07/31/21  1637   PHART 7.375   [id number] 54.6*   PO2ART 61.2*   XFP1AZC 31.9*   BEART 7*   [id number] 90*   [id number] 34       INR:   Recent Labs     07/31/21  0430 08/01/21  0452 08/02/21  0415   INR 2.11* 1.89* 1.88*     Lactate:   Recent Labs     07/31/21  1637   LACTATE 9.19*     Cultures:  -----------------------------------------------------------------  RAD:   CT CHEST WO CONTRAST   Final Result      1. Unchanged large multiloculated right pleural effusion and new small left pleural effusion. 2.  Worsening opacities involving both lungs. Differential considerations include infection, pulmonary edema, pneumonitis, diffuse alveolar damage, and hemorrhage. 3.  Cervical, thoracic, and upper abdominal lymphadenopathy persists. This may be reactive, however metastatic disease and lymphoma can't be excluded. 4.  Unchanged cirrhosis with sequela of portal hypertension. XR CHEST PORTABLE   Final Result      Complete opacification of the right hemithorax. CT GUIDED THORACENTESIS   Final Result      Uneventful CT-guided right thoracentesis      XR CHEST PORTABLE   Final Result      Worsening infiltrates. CT ABDOMEN PELVIS W IV CONTRAST Additional Contrast? None   Final Result      CHEST      1. Large multiloculated right pleural effusion. Recommend diagnostic and therapeutic thoracentesis. 2.  Complete atelectasis of the right upper lobe. Right upper lobe airway secretions. 3.  Compressive atelectasis in the right lower lobe and middle lobe. 4.  Ill-defined nodular opacities in left upper lobe likely infectious or inflammatory but nonspecific.   5.  Mild mediastinal and bilateral supraclavicular lymphadenopathy. ABDOMEN AND PELVIS      1.   Cirrhotic liver with stigmata of portal hypertension including splenomegaly, mild ascites and esophageal and gastric varices. 2.  Jessica hepatis, celiac axis and retroperitoneal lymphadenopathy. 3.  Diffusely thickened bowel and stomach may represent sequela of portal hypertension versus infectious/inflammatory. 4.  Anasarca. 5.  Right inguinal hernia containing small bowel without evidence of complication. CT CHEST W CONTRAST   Final Result      CHEST      1. Large multiloculated right pleural effusion. Recommend diagnostic and therapeutic thoracentesis. 2.  Complete atelectasis of the right upper lobe. Right upper lobe airway secretions. 3.  Compressive atelectasis in the right lower lobe and middle lobe. 4.  Ill-defined nodular opacities in left upper lobe likely infectious or inflammatory but nonspecific.   5.  Mild mediastinal and bilateral supraclavicular lymphadenopathy. ABDOMEN AND PELVIS      1. Cirrhotic liver with stigmata of portal hypertension including splenomegaly, mild ascites and esophageal and gastric varices. 2.  Jessica hepatis, celiac axis and retroperitoneal lymphadenopathy. 3.  Diffusely thickened bowel and stomach may represent sequela of portal hypertension versus infectious/inflammatory. 4.  Anasarca. 5.  Right inguinal hernia containing small bowel without evidence of complication. CT HEAD WO CONTRAST   Final Result      1. No acute intracranial hemorrhage or mass effect. 2.  Sinus disease. XR CHEST (2 VW)   Final Result      Extensive right pleural-parenchymal opacities, which can be seen with pneumonia or malignancy.  CT chest with contrast.                Assessment/Plan:        Acute hypoxic respiratory failure due to pneumococcal pneumonia complicated by bacteremia and suspected component of pulmonary edema in setting of multiple transfusions  - cont CTX 2g QD day 6 today  - resume diuresis with lasix and aldactone 100 mg daily  - Lasix dose increased to 60 IV 3 times daily per nephrology  - effusion tested and appears to be transudate  - repeat BCx pending  - give levofloxacin x7 days at discharge  - pulm and ID following     Decompensated cirrhosis  - new diagnosis, suspected related to HCV, prior ETOH use. - will require EGD for varices outpatient  - HCV treatment outpatient  - HBV+, DNA levels pending  - replace K prn     Bleeding diathesis  - primarily due to cirrhosis  - low fibrinogen, elevated INR, low plts. - no further oozing  - s/p multiple FFP transfusions  - if bleeds would give FFP and cryo  - DVT ppx held      Anasarca  - due to low protein state in setting of cirrhosis  - nephrology following  - continue lasix with spironolactone.      IV fentanyl use  - COWS protocol  - discussed withdrawal and risk of sensitization after his hospital stay     Hematuria  - f/u outpatient with urology  - monitor for recurrence    Code Status: Full code  FEN: Low-salt diet  PPX: Protonix, holding DVT prophylaxis  DISPO: Home on Darwin Charles MD, PGY-1  08/02/21  9:27 AM    This patient has been staffed and discussed with Areli Wahl MD.     Patient seen and examined, labs and imaging studies reviewed, agree with assessment and plan as outlined above. Continue with current care and plan. Discussed case with patients nurse, discussed case with care team, discussed plan. Patient has a long standing history of smoking and likely has underlying copd he will need outpatient follow up and pft's to confirm. He likely has a chronic component of his acute respiratory failure, he looks very comfortable even when his o2 goes down. Risks and benefits of discharge vs staying discussed at length. D/w social work. D/w social work, patient unfortunately does not qualify for home oxygen. At length discussed need to stay in the hospital for monitoring with the patient. Patient understands and communicates understanding.      MD Josiah Vazquez

## 2021-08-02 NOTE — PROGRESS NOTES
Office : [phone]     Fax :[phone]         Renal Progress Note  Subjective:   Admit Date: 7/25/2021     Interval History:   No acute events overnight. Patient states his shortness of breath is improving and is currently down to 4 liters HFNC. Abdominal swelling also improving. Doing well on diuresis with good urine output. Denies any chest pain, abdominal pain, fever, chills, nausea, vomiting or changes in bowel/urinary habits. DIET ADULT DIET; Regular; Low Sodium (2 gm)  Medications:   Scheduled Meds:   lidocaine  1 patch Transdermal BID    Hydrocerin   Topical BID    furosemide  60 mg Intravenous TID    hydrocortisone   Topical BID    albuterol sulfate HFA  2 puff Inhalation 4x daily    cefTRIAXone (ROCEPHIN) IV  2,000 mg Intravenous Q24H    sodium chloride flush  5-40 mL Intravenous 2 times per day    spironolactone  100 mg Oral Daily    sodium chloride flush  5-40 mL Intravenous 2 times per day     Continuous Infusions:   sodium chloride      sodium chloride         Labs:  CBC:   Recent Labs     07/31/21 0430 08/01/21 0452 08/02/21  0415   WBC 9.4 12.6* 11.7*   HGB 9.6* 10.2* 10.0*   PLT 69* 78* 75*     BMP:    Recent Labs     07/31/21  0430 07/31/21  0430 08/01/21 0452 08/01/21  1336 08/02/21  0415     --  140  --  140   K 3.5   < > 3.3* 3.5 3.9   CL 97*  --  96*  --  98*   CO2 37*  --  37*  --  38*   BUN 18  --  22*  --  20   CREATININE <0.5*  --  <0.5*  --  <0.5*   GLUCOSE 101*  --  116*  --  90    < > = values in this interval not displayed.      Ca/Mg/Phos:   Recent Labs     07/31/21  0430 08/01/21 0452 08/02/21  0415   CALCIUM 8. 0* 8.1* 8.3   MG 1.90 1.80  --      Hepatic:   Recent Labs     07/31/21  0430 08/01/21 0452 08/02/21 0415   AST 40* 43* 54*   ALT 14 15 18   BILITOT 4.1* 4.3* 5.4*   ALKPHOS 68 85 81     Troponin: No results for input(s): TROPONINI in the last 72 hours. BNP: No results for input(s): BNP in the last 72 hours. Lipids: No results for input(s): CHOL, TRIG, HDL, LDLCALC, LABVLDL in the last 72 hours. ABGs:   Recent Labs     07/31/21  1637   PHART 7.375   PO2ART 61.2*   QUP0TVT 54.6*     INR:   Recent Labs     07/31/21 0430 08/01/21 0452 08/02/21 0415   INR 2.11* 1.89* 1.88*     UA:  Recent Labs     07/31/21  1852   PHUR 5.5      Urine Microscopic: No results for input(s): LABCAST, BACTERIA, COMU, HYALCAST, WBCUA, RBCUA, EPIU in the last 72 hours. Urine Culture: No results for input(s): LABURIN in the last 72 hours. Urine Chemistry: No results for input(s): [place], PROTEINUR, NAUR in the last 72 hours. Objective:   Vitals: /70   Pulse 83   Temp 97.7 °F (36.5 °C) (Axillary)   Resp 18   Ht 5' 10\" (1.778 m)   Wt 195 lb 15.8 oz (88.9 kg)   SpO2 94%   BMI 28.12 kg/m²    Wt Readings from Last 3 Encounters:   08/02/21 195 lb 15.8 oz (88.9 kg)      24HR INTAKE/OUTPUT:      Intake/Output Summary (Last 24 hours) at 8/2/2021 0825  Last data filed at 8/2/2021 0500  Gross per 24 hour   Intake 1070 ml   Output 2360 ml   Net -1290 ml     Constitutional:  Alert, awake, no apparent distress  NECK: supple, no JVD  Cardiovascular:  S1, S2 without m/r/g  Respiratory:  CTA B without w/r/r  Abdomen: +bs, soft, nt, edematous   Ext: +2 LE edema with chronic venous stasis. Assessment and Plan:       IMAGING:  CT CHEST WO CONTRAST   Final Result      1. Unchanged large multiloculated right pleural effusion and new small left pleural effusion. 2.  Worsening opacities involving both lungs. Differential considerations include infection, pulmonary edema, pneumonitis, diffuse alveolar damage, and hemorrhage. 3.  Cervical, thoracic, and upper abdominal lymphadenopathy persists. This may be reactive, however metastatic disease and lymphoma can't be excluded. 4.  Unchanged cirrhosis with sequela of portal hypertension. XR CHEST PORTABLE   Final Result      Complete opacification of the right hemithorax. CT GUIDED THORACENTESIS   Final Result      Uneventful CT-guided right thoracentesis      XR CHEST PORTABLE   Final Result      Worsening infiltrates. CT ABDOMEN PELVIS W IV CONTRAST Additional Contrast? None   Final Result      CHEST      1. Large multiloculated right pleural effusion. Recommend diagnostic and therapeutic thoracentesis. 2.  Complete atelectasis of the right upper lobe. Right upper lobe airway secretions. 3.  Compressive atelectasis in the right lower lobe and middle lobe. 4.  Ill-defined nodular opacities in left upper lobe likely infectious or inflammatory but nonspecific.   5.  Mild mediastinal and bilateral supraclavicular lymphadenopathy. ABDOMEN AND PELVIS      1. Cirrhotic liver with stigmata of portal hypertension including splenomegaly, mild ascites and esophageal and gastric varices. 2.  Jessica hepatis, celiac axis and retroperitoneal lymphadenopathy. 3.  Diffusely thickened bowel and stomach may represent sequela of portal hypertension versus infectious/inflammatory. 4.  Anasarca. 5.  Right inguinal hernia containing small bowel without evidence of complication. CT CHEST W CONTRAST   Final Result      CHEST      1. Large multiloculated right pleural effusion. Recommend diagnostic and therapeutic thoracentesis. 2.  Complete atelectasis of the right upper lobe. Right upper lobe airway secretions. 3.  Compressive atelectasis in the right lower lobe and middle lobe. 4.  Ill-defined nodular opacities in left upper lobe likely infectious or inflammatory but nonspecific.   5.  Mild mediastinal and bilateral supraclavicular lymphadenopathy. ABDOMEN AND PELVIS      1. Cirrhotic liver with stigmata of portal hypertension including splenomegaly, mild ascites and esophageal and gastric varices. 2.  Jessica hepatis, celiac axis and retroperitoneal lymphadenopathy. 3.  Diffusely thickened bowel and stomach may represent sequela of portal hypertension versus infectious/inflammatory. 4.  Anasarca. 5.  Right inguinal hernia containing small bowel without evidence of complication. CT HEAD WO CONTRAST   Final Result      1. No acute intracranial hemorrhage or mass effect. 2.  Sinus disease. XR CHEST (2 VW)   Final Result      Extensive right pleural-parenchymal opacities, which can be seen with pneumonia or malignancy. CT chest with contrast.                Assessment/Plan     1. Anasarca  - Etiology from decompensated cirrhosis  - Increase Lasix 60 mg IV TID to 80 mg IV TID  - Aldactone 100 mg PO daily  - Albumin   - Monitor UOP & RF  - Strict I&Os  - Daily weights  - Low Na diet    2. Decompensated Cirrhosis   - Likely due to HCV and history of EtOH abuse  - See treatment plan above    3. Anemia  - Likely from chronic disease  - Stable, continue to monitor    4. Acid-Base/Electrolyte Imbalances  - Daily labs  - Monitor and replace electrolytes prn    5.  Hepatitis C  - HCV treatment outpatient with Dr. Arielle Avalos MD     Discussed with Dr. Whitlock of   [facility] [address] [facility] [phone]  [phone]    Cot diuresis at discharge   F/u as out pt    Buddy Mitchell MD

## 2021-08-02 NOTE — DISCHARGE INSTR - COC
Continuity of Care Form    Patient Name: Hakeem Davis   :  1970  MRN:  [id number]    Admit date:  2021  Discharge date:  ***    Code Status Order: Full Code   Advance Directives:      Admitting Physician:  Brad Melchor MD  PCP: No primary care provider on file. Discharging Nurse: [facility] Unit/Room#: 1229/6737-88  Discharging Unit Phone Number: ***    Emergency Contact:   No emergency contact information on file. Past Surgical History:  History reviewed. No pertinent surgical history. Immunization History: There is no immunization history on file for this patient.     Active Problems:  Patient Active Problem List   Diagnosis Code    Cirrhosis ([facility] [address] K74.60    Pleural effusion J90    Hepatitis C virus infection without hepatic coma B19.20    Hypoxia R09.02    Pneumococcal bacteremia R78.81, B95.3       Isolation/Infection:   Isolation            No Isolation          Patient Infection Status       Infection Onset Added Last Indicated Last Indicated By Review Planned Expiration Resolved Resolved By    None active    Resolved    COVID-19 Rule Out 21 COVID-19 (Ordered)   21 Rule-Out Test Resulted    C-diff Rule Out 21 Clostridium difficile toxin/antigen (Ordered)   21 Shelby Dear Post, RN    Order discontinued            Nurse Assessment:  Last Vital Signs: /72   Pulse 84   Temp 97.6 °F (36.4 °C) (Oral)   Resp 19   Ht 5' 10\" (1.778 m)   Wt 195 lb 15.8 oz (88.9 kg)   SpO2 93%   BMI 28.12 kg/m²     Last documented pain score (0-10 scale): Pain Level: 7  Last Weight:   Wt Readings from Last 1 Encounters:   21 195 lb 15.8 oz (88.9 kg)     Mental Status:  oriented and alert    IV Access:  - None    Nursing Mobility/ADLs:  Walking   Independent- Stand by assist  Transfer  Independent- Stand by assist  Bathing  Independent  Dressing  Independent  Toileting  Independent  Feeding Independent  Med Admin  Independent  Med Delivery   whole    Wound Care Documentation and Therapy:        Elimination:  Continence:   · Bowel: Yes  · Bladder: Yes  Urinary Catheter: None   Colostomy/Ileostomy/Ileal Conduit: No       Date of Last BM: 2021    Intake/Output Summary (Last 24 hours) at 2021 1342  Last data filed at 2021 1128  Gross per 24 hour   Intake 650 ml   Output 2925 ml   Net -2275 ml     I/O last 3 completed shifts: In: 1 [P.O.:960; I.V.:60; IV Piggyback:50]  Out: 2360 [Urine:2360]    Safety Concerns:     Drug abuse    Impairments/Disabilities:      None    Nutrition Therapy:  Current Nutrition Therapy:   - Oral Diet:  Low Sodium (2gm)    Routes of Feeding: Oral  Liquids: Thin Liquids  Daily Fluid Restriction: no  Last Modified Barium Swallow with Video (Video Swallowing Test): not done    Treatments at the Time of Hospital Discharge:   Respiratory Treatments: ***  Oxygen Therapy:  is on oxygen at 4 ( 6 with ambulation) L/min per nasal cannula. Ventilator:    - No ventilator support    Rehab Therapies: Physical Therapy and Occupational Therapy  Weight Bearing Status/Restrictions: [address] [place] [address] Weight Bearin:::0}  Other Medical Equipment (for information only, NOT a DME order):     Other Treatments: ***    Patient's personal belongings (please select all that are sent with patient):  with pt    RN SIGNATURE:  Electronically signed by Dar Bertrand RN on 21 at 2:59 PM EDT    CASE MANAGEMENT/SOCIAL WORK SECTION    Inpatient Status Date: ***    Readmission Risk Assessment Score:  Readmission Risk              Risk of Unplanned Readmission:  12           Discharging to Facility/ Agency   · Name:   · Address:  · Phone:  · Fax:    Dialysis Facility (if applicable)   · Name:  · Address:  · Dialysis Schedule:  · Phone:  · Fax:    / signature: {Esignature:[id number]:::0}    PHYSICIAN SECTION    Prognosis: Guarded    Condition at Discharge: Stable    Rehab Potential (if transferring to Rehab): Fair    Recommended Labs or Other Treatments After Discharge: Follow up with GI, Dr Brittany Luong for 5 days  Start spironolactone and furosemide    I certify that I, or a nurse practitioner or physician assistant working with me, had an in-person encounter with Darin Cho on 8/2/2021 and the reason for the home care services is documented in the clinical note for that day. Darin Cho was assessed on the above date and was found to have medical conditions requiring Home Care that included decompensated cirrhosis. Homebound Status: further, I certify that my clinical findings support that Darin Cho does meet the Medicare definition of \"Confined to the Home\" because of   Deconditioned due to medical condition     Certification and medical necessity: I certify that, based on my findings, the following services are medically necessary home health services for Darin Cho for the following reasons:  - Vital signs monitoring: Nurse to perform BP, HR, RR, Temp, and Weight with each visit and teach patient and caregivers on taking daily. Nurse to call physician if pulse less than 50 or greater than 120, respiratory rate less than 12 or greater than 25, oral temperature greater than or equal to 159 oF, systolic BP less than 90 or greater than 097, diastolic BP less than 50 or greater than 100. Physician Certification: I certify the above information and transfer of Darin Cho  is necessary for the continuing treatment of the diagnosis listed and that he requires [address] for greater 30 days.      Update Admission H&P: No change in H&P    PHYSICIAN SIGNATURE:  Electronically signed by Rylee Juarez MD / Mary Casanova MD on 8/2/21 at 1:43 PM EDT

## 2021-08-02 NOTE — PROGRESS NOTES
GI Progress Note      Benton Barrera is a 45 y.o. male patient. 1. Pleural effusion    2. Hypoxia    3. Hepatitis C virus infection without hepatic coma, unspecified chronicity    4. Cirrhosis of liver with ascites, unspecified hepatic cirrhosis type ([facility] [address]    5. Chronic venous stasis        Admit Date: 2021    Subjective:       No pain.  No fever; breathing better    ROS:  As per above    Scheduled Meds:   lidocaine  1 patch Transdermal BID    Hydrocerin   Topical BID    furosemide  60 mg Intravenous TID    hydrocortisone   Topical BID    albuterol sulfate HFA  2 puff Inhalation 4x daily    cefTRIAXone (ROCEPHIN) IV  2,000 mg Intravenous Q24H    sodium chloride flush  5-40 mL Intravenous 2 times per day    spironolactone  100 mg Oral Daily    sodium chloride flush  5-40 mL Intravenous 2 times per day       Continuous Infusions:   sodium chloride      sodium chloride         PRN Meds:  albuterol, sodium chloride (Inhalant), sodium chloride flush, sodium chloride, ondansetron **OR** ondansetron, sodium chloride flush, sodium chloride, [] traMADol **AND** cloNIDine      Objective:       Patient Vitals for the past 24 hrs:   BP Temp Temp src Pulse Resp SpO2 Weight   21 0826 118/75 97.3 °F (36.3 °C) Oral 86 18 92 %    21 0755      94 %    21 0426       195 lb 15.8 oz (88.9 kg)   21 0345 108/70 97.7 °F (36.5 °C) Axillary 83 18 96 % 195 lb 15.8 oz (88.9 kg)   21 2330 105/72 98.7 °F (37.1 °C) Oral 85 20 93 %    21 110/72 99.5 °F (37.5 °C) Oral 92 20 94 %    21 1812 106/70 97.4 °F (36.3 °C) Oral 87 18 92 %    21 1646     20 91 %    21 1436    90      21 1249     18 93 %    21 1158 111/69 97.8 °F (36.6 °C) Oral 81 18 94 %    21 1156      96 %    21 1005    76  95 %        Exam:  VITALS:  /75   Pulse 86   Temp 97.3 °F (36.3 °C) (Oral)   Resp 18   Ht 5' 10\" (1.778 m) Wt 195 lb 15.8 oz (88.9 kg)   SpO2 92%   BMI 28.12 kg/m²   TEMPERATURE:  Current - Temp: 97.3 °F (36.3 °C); Max - Temp  Av.1 °F (36.7 °C)  Min: 97.3 °F (36.3 °C)  Max: 99.5 °F (37.5 °C)      General appearance: alert, appears stated age, cooperative and no distress  Head: Normocephalic, without obvious abnormality, atraumatic  Neck: supple, symmetrical, trachea midline and thyroid not enlarged, symmetric, no tenderness/mass/nodules  CVS:  RRR, Nl s1s2  Lungs rales on right  Abdomen: soft, non-tender; bowel sounds normal; no masses,  no organomegaly  AAOx3, No asterixis or encephalopathy  Extremities: + edema and pachydermia. Recent Labs     215   WBC 9.4 12.6* 11.7*   HGB 9.6* 10.2* 10.0*   HCT 28.4* 30.1* 29.4*   MCV 93.4 94.0 95.4   PLT 69* 78* 75*     Recent Labs     21  1336 21  0415     --  140  --  140   K 3.5   < > 3.3* 3.5 3.9   CL 97*  --  96*  --  98*   CO2 37*  --  37*  --  38*   BUN 18  --  22*  --  20   CREATININE <0.5*  --  <0.5*  --  <0.5*    < > = values in this interval not displayed. Recent Labs     21  0415   AST 40* 43* 54*   ALT 14 15 18   BILITOT 4.1* 4.3* 5.4*   ALKPHOS 68 85 81     No results for input(s): LIPASE, AMYLASE in the last 72 hours. Recent Labs     21  0415   PROT 5.6* 6.0* 5.8*   INR 2.11* 1.89* 1.88*     No results for input(s): PTT in the last 72 hours. No results for input(s): OCCULTBLD in the last 72 hours. Assessment:       Cirrhosis  Hepatic hydrothorax    Recommendations:       Does not have Hep C given negative viral load  Has Hep B sAg and sAb. Await HBV DNA; likely has been exposed to two different Hep B types. Immune to one and question chronic infection vs acute infection with the other.  Await HBV DNA; may wait a few months to see if clears first as 95% of adults will clear virus  2 gram Na+diet; diuretics per neph  Follow up as outpatient with Dr. Kenya Cisneros MD  8/2/2021  9:32 AM

## 2021-08-02 NOTE — FLOWSHEET NOTE
07/31/21 1600   Encounter Summary   Services provided to: Patient   Referral/Consult From:   (Code Blue. )   Continue Visiting   (7/31/21, CATHY. )   Complexity of Encounter High   Length of Encounter 15 minutes   Routine   Type Initial   Assessment Approachable   Intervention Nurtured hope   Outcome Expressed gratitude   Crisis   Type Code  (Code Blue. )

## 2021-08-02 NOTE — PROGRESS NOTES
Rn reviewed discharge instructions with pt. IV and tele removed. RN stated total for meds in outpt pharm and he stated that he plans to pick them up tomorrow d/t price. Rn educated the need for med compliance and importance of getting them tonight and he said \"i'll see what I can do. \" Clarif ed discharge orders with Dr. Arti Marquez as it was canceled, once addressed it was stated he is discharging a new orders placed. Pt discharged with all belonging and oxygen tank by PCA.

## 2021-08-02 NOTE — PROGRESS NOTES
AM assessment charted. /75   Pulse 86   Temp 97.3 °F (36.3 °C) (Oral)   Resp 18   Ht 5' 10\" (1.778 m)   Wt 195 lb 15.8 oz (88.9 kg)   SpO2 92%   BMI 28.12 kg/m²   Pt endorsed sternal pain with coughing, declines interventions. Will continue to monitor.

## 2021-08-02 NOTE — CARE COORDINATION
[facility]     LOC at discharge: Not Applicable  BLAKE Completed: Not Indicated    Notification completed in HENS/PAS?:  Not Applicable    IMM Completed:   Not Indicated    Transportation:  Transportation PLAN for discharge: friend   Mode of Transport: Private Car  SPONSES; YES/NO/NOT [id number]}    Home Care:  [address] ordered at discharge: Yes  [address]: [facility]  Phone: [phone]  Fax: [phone]  Orders faxed: Yes    Durable Medical Equipment:  DME Provider: n/a  Equipment obtained during hospitalization: n/a    Home Oxygen and Respiratory Equipment:  Oxygen needed at discharge?: Yes  [address]: [address]  Phone: [phone]     Dialysis:  Dialysis patient: No    Dialysis Center:  Not Applicable    Hospice Services:  Location: Not Applicable  Agency: Not Applicable    Consents signed: Not Indicated    Referrals made at [facility] for outpatient continued care:  Not Applicable    Additional CM Notes:   Patient to discharge home. [facility] for HHC needs. [facility] is talking to patient on home O2, to see if he wants to pay out of pocket until he can follow up with doctor on the clinical diagnosis. Patient states a friend will transport him home. The Plan for Transition of Care is related to the following treatment goals of Cirrhosis ([facility] [address] [K74.60]    The Patient and/or patient representative Jaiden Rodriguez and his family were provided with a choice of provider and agrees with the discharge plan Yes    Freedom of choice list was provided with basic dialogue that supports the patient's individualized plan of care/goals and shares the quality data associated with the providers.  Yes    Care Transitions patient: No    Minesh Barrett RN  [facility]  Case Management Department  Ph: [phone]  Fax: [phone]

## 2021-08-02 NOTE — PROGRESS NOTES
ID Follow-up NOTE  RESIDENT NOTE - reviewed / edited, attending note at bottom    CC:   Pneumococcal bacteremia, R effusion, HCV/ HBV infection  Antibiotics: Ceftriaxone    Admit Date: 7/25/2021  Hospital Day: 9    Subjective:     7/31: Code blue called 4:44PM, pt became bradycardic and unresponsive and lost pulse. CPR and epinephrine given, and 2 mg of Narcan  Pt reports visitor passed him an orange pop and that's all he remembers. UDS was positive for cannabinoids. No fevers over the weekend WBC 11.7 O2 92% on 5L HFNC  Patient more awake this AM states he is feeling better. All remembers with the code is that he was drinking orange pop and he felt like it got stuck his chest. Pt would like to get the COVID vaccine during this admission or after he leaves. SOB improved. Denies fevers, chills, n/v.    Objective:     Patient Vitals for the past 8 hrs:   BP Temp Temp src Pulse Resp SpO2 Weight   08/02/21 0826 118/75 97.3 °F (36.3 °C) Oral 86 18 92 %    08/02/21 0755      94 %    08/02/21 0426       195 lb 15.8 oz (88.9 kg)   08/02/21 0345 108/70 97.7 °F (36.5 °C) Axillary 83 18 96 % 195 lb 15.8 oz (88.9 kg)     I/O last 3 completed shifts: In: 7794 [P.O.:960; I.V.:60; IV Piggyback:50]  Out: 2360 [Urine:2360]  No intake/output data recorded.     EXAM:  GENERAL:      No apparent distress  HEENT:           Membranes moist, no oral lesion, PERRL  NECK:             Supple, no lymphadenopathy  LUNGS:           Wheezes, rhonchi, dec breath sounds on the right   CARDIAC:       RRR, no murmur appreciated  ABD:                + BS, distended, TTP  EXT:                Bilateral LE chronic appearing hypertrophic, nodular lesions, bilateral UE tract marks  NEURO:          No focal neurologic findings  PSYCH:           Orientation, sensorium, mood normal  LINES:            central line       Data Review:  Lab Results   Component Value Date    WBC 11.7 (H) 08/02/2021    HGB 10.0 (L) 08/02/2021    HCT 29.4 (L) 08/02/2021    MCV 95.4 08/02/2021    PLT 75 (L) 08/02/2021     Lab Results   Component Value Date    CREATININE <0.5 (L) 08/02/2021    BUN 20 08/02/2021     08/02/2021    K 3.9 08/02/2021    CL 98 (L) 08/02/2021    CO2 38 (H) 08/02/2021       Hepatic Function Panel:   Lab Results   Component Value Date    ALKPHOS 81 08/02/2021    ALT 18 08/02/2021    AST 54 08/02/2021    PROT 5.8 08/02/2021    BILITOT 5.4 08/02/2021    BILIDIR 1.8 07/25/2021    IBILI 3.3 07/25/2021    LABALBU 3.0 08/02/2021       MICRO:  7/25/2021: Blood cx x2, strep pneumoniae  7/28/2021: Pleural fluid gram stain and cx's: 1+WBCs, no organisms seen  7/29/2021: Blood cultures: NGTD         Urine Strep pneumoniae antigen negative      IMAGING:  CT-guided right thoracentesis, therapeutic 7/28/2021  HISTORY: Loculated right pleural effusion     Procedure performed by Dr. Rhiannon Rossi after explanation of procedure, risks and complications to patient. Recent CT chest 7/25/2021 was reviewed prior to the procedure     Patient placed in a left lateral decubitus position and scans repeated through the chest with redemonstration of the large multiloculated pleural effusion. Up-to-date CT equipment with radiation dose reduction techniques     Skin prepped in sterile manner and local anesthesia administered. Angiocath advanced into one of the larger loculations of fluid via lateral intercostal approach.     850 mL of clear yellowish fluid withdrawn for therapeutic purposes. Samples sent for analysis as ordered. Patient tolerated procedure well. Following the procedure, repeat imaging shows a few tiny air bubbles within the lateral pleural space related to   the procedure.     CT CHEST WO CONTRAST 7/29/2021  1.   Unchanged large multiloculated right pleural effusion and new small left pleural effusion. 2.  Worsening opacities involving both lungs.  Differential considerations include infection, pulmonary edema, pneumonitis, diffuse alveolar damage, and hemorrhage. 3.  Cervical, thoracic, and upper abdominal lymphadenopathy persists. This may be reactive, however metastatic disease and lymphoma can't be excluded. 4.  Unchanged cirrhosis with sequela of portal hypertension. Scheduled Meds:   lidocaine  1 patch Transdermal BID    Hydrocerin   Topical BID    furosemide  60 mg Intravenous TID    hydrocortisone   Topical BID    albuterol sulfate HFA  2 puff Inhalation 4x daily    cefTRIAXone (ROCEPHIN) IV  2,000 mg Intravenous Q24H    sodium chloride flush  5-40 mL Intravenous 2 times per day    spironolactone  100 mg Oral Daily    sodium chloride flush  5-40 mL Intravenous 2 times per day       Continuous Infusions:   sodium chloride      sodium chloride         PRN Meds:  albuterol, sodium chloride (Inhalant), sodium chloride flush, sodium chloride, ondansetron **OR** ondansetron, sodium chloride flush, sodium chloride, [] traMADol **AND** cloNIDine      Assessment:     Substance abuse  Cirrhosis - HCV, HBV positive  R pneumonia- s/p azithromycin x3 days   -urine strep antigen negative  R effusion: new small pleural effusion and worsening opacities of both lungs--reviewed images with radiologist, possible aspiration in the left   -fluid transudative, hepatic hydrothorax  S pneumoniae bacteremia, pul source  procalcitonin elevated    Plan:     Cont ceftriaxone (day 8)     Post-discharge - GI f/u for rx HBV / HCV  Drug abstinence    At discharge, po levofloxacin 500 mg po daily x 7 days further     Discussed with pt with Dr Aanli Marrero MD, PGY-1    Addendum to Resident Progress note:  Pt seen, examined and evaluated. I have reviewed the current history, physical findings, labs and assessment and plan and agree with note as documented by resident (Dr. Quarles).     44 yo man with no established med hx, hx opiate use (fentanyl)     Presents with SOB, cough with 'green' sputum over weeks, worse  Pt with abd pain, RUQ.    Developed nausea / vomiting, reason for coming to ED     In ED 7/25, afeb, WBC 35.9, elevated pro-BNP, alb 2.2, bili 5.1  Imaging with R effusion, lung collapse, cirrhosis with varices  Admit, started on ceftriaxone + azithromycin     7/28 IR thoracentesis 850 ml - transudate (pH 7.0, glu 92, prot 1.0, GS 1+WBC, no organism)     7/29 f/u CT - b/l effusion, new airspace d     7/31 Code Blue    Today 8/2 - afeb, on 5L. WBC 11.7    DATA -  BC x 2 S pneumoniae 7/25 positive; F/u [facility] 7/29 - neg  SARS CoV-2 PCR neg  HIV NR  Procalcitonin 7/30 - 0.43  Urinary Pneumococcal ag 7/29 - neg  Pleural fluid cult 7/28 - neg     IMP/  Substance abuse  Cirrhosis - HCV, HBV positive, + varices, low albumin, elevated INR  LE venous stasis, hypertrophic changes    R pneumonia  R effusion  S pneumoniae bacteremia 7/25 , pul source     REC/  Cont ceftriaxone  D#8     Post-discharge - GI f/u for rx HBV / HCV  Drug abstinence     At discharge, po levofloxacin 500 mg po daily x 5 days further   No ID f/u necessary     Medical Decision Making:   The following items were considered in medical decision making:  Discussion of patient care with other providers  Reviewed clinical lab tests  Reviewed radiology tests  Reviewed other diagnostic tests/interventions  Independent review of radiologic images - reviewed CXR, Chest CT   Microbiology cultures and other micro tests reviewed       Discussed with pt  Purvi Mcdermott MD

## 2021-08-02 NOTE — FLOWSHEET NOTE
08/02/21 1524   Encounter Summary   Services provided to: Patient   Referral/Consult From: Rounding   Continue Visiting   (8/2, robert )   Complexity of Encounter Moderate   Length of Encounter 15 minutes   Routine   Type Follow up   Assessment Calm; Approachable   Intervention Active listening;Explored feelings, thoughts, concerns   Outcome Comfort;Expressed gratitude   Staff Betsy Jean

## 2021-08-02 NOTE — PLAN OF CARE
Problem: Falls - Risk of:  Goal: Will remain free from falls  Description: Will remain free from falls  8/2/2021 0045 by Slim Potts RN  Outcome: Met This Shift  8/1/2021 1853 by Victorina Barrientos RN  Outcome: Ongoing  Goal: Absence of physical injury  Description: Absence of physical injury  8/2/2021 0045 by Slim Potts RN  Outcome: Met This Shift  8/1/2021 1853 by Victorina Barrientos RN  Outcome: Ongoing     Problem: Skin Integrity:  Goal: Will show no infection signs and symptoms  Description: Will show no infection signs and symptoms  8/2/2021 0045 by Slim Potts RN  Outcome: Ongoing  8/1/2021 1853 by Victorina Barrientos RN  Outcome: Ongoing  Goal: Absence of new skin breakdown  Description: Absence of new skin breakdown  8/2/2021 0045 by Slim Potts RN  Outcome: Ongoing  8/1/2021 1853 by Victorina Barrientos RN  Outcome: Ongoing     Problem: Pain:  Goal: Pain level will decrease  Description: Pain level will decrease  8/2/2021 0045 by Slim Potts RN  Outcome: Met This Shift  8/1/2021 1853 by Victorina Barrientos RN  Outcome: Ongoing  Goal: Control of acute pain  Description: Control of acute pain  8/2/2021 0045 by Slim Potts RN  Outcome: Met This Shift  8/1/2021 1853 by Victorina Barrientos RN  Outcome: Ongoing  Goal: Control of chronic pain  Description: Control of chronic pain  8/2/2021 0045 by Slim Potts RN  Outcome: Met This Shift  8/1/2021 1853 by Victorina Barrientos RN  Outcome: Ongoing  Goal: Patient's pain/discomfort is manageable  Description: Patient's pain/discomfort is manageable  8/2/2021 0045 by Slim Potts RN  Outcome: Met This Shift  8/1/2021 1853 by Victorina Barrientos RN  Outcome: Ongoing     Problem: Infection:  Goal: Will remain free from infection  Description: Will remain free from infection  8/2/2021 0045 by Slim Potts RN  Outcome: Ongoing  8/1/2021 1853 by Victorina Barrientos RN  Outcome: Ongoing     Problem: Safety:  Goal: Free from accidental physical injury  Description: Free from accidental physical injury  8/2/2021 0045 by Ammon Chavez RN  Outcome: Met This Shift  8/1/2021 1853 by Mercedes Hatch RN  Outcome: Ongoing  Goal: Free from intentional harm  Description: Free from intentional harm  8/2/2021 0045 by Ammon Chavez RN  Outcome: Met This Shift  8/1/2021 1853 by Mercedes Hatch RN  Outcome: Ongoing     Problem: Daily Care:  Goal: Daily care needs are met  Description: Daily care needs are met  8/2/2021 0045 by Ammon Chavez RN  Outcome: Ongoing  8/1/2021 1853 by Mercedes Hatch RN  Outcome: Ongoing     Problem: Discharge Planning:  Goal: Patients continuum of care needs are met  Description: Patients continuum of care needs are met  8/2/2021 0045 by Ammon Chavez RN  Outcome: Not Met This Shift  8/1/2021 1853 by Mercedes Hatch RN  Outcome: Ongoing     Problem: Cardiac:  Goal: Hemodynamic stability will improve  Description: Hemodynamic stability will improve  8/2/2021 0045 by Ammon Chavez RN  Outcome: Ongoing  8/1/2021 1853 by Mercedes Hatch RN  Outcome: Ongoing

## 2021-08-02 NOTE — DISCHARGE SUMMARY
INTERNAL MEDICINE DEPARTMENT AT [address] [place] [address]  DISCHARGE SUMMARY    Patient ID: Hakeem Davis                                             Discharge Date: 8/2/2021   Patient's PCP: No primary care provider on file. Discharge Physician: Renee Wells MD  Admit Date: 7/25/2021   Admitting Physician: Brad Melchor MD    DISCHARGE DIAGNOSES:  1. Pleural effusion    2. Hypoxia    3. Hepatitis C virus infection without hepatic coma, unspecified chronicity    4. Cirrhosis of liver with ascites, unspecified hepatic cirrhosis type ([address]    5. Chronic venous stasis          Hospital Course:    58-year-old male with past medical history of hepatitis C, IV drug user, no previous medical care presents to the ED with diffuse abdominal pain, emesis and shortness of breath. Patient started to notice bloating of the abdominal since a year. He started noticing right lower quadrant pain which was intermittent, diffuse, achy, 5/10 in intensity, non-radiating with no aggravating or relieving factors. He also reported a couple of months of history of shortness of breath, worsening with exacerbation. On admission he denied any chest pain associated with shortness of breath, palpitations. He denied any fevers, chills. On admission ROS was positive for productive cough with greenish sputum and bilateral lower extremity rash. Of note, patient reported having intermittent scrotal swelling for 3 months. Patient was admitted for the concern of   decompensated cirrhosis with ascites secondary to chronic hepatitis C. patient was started on 100 mg spironolactone and 40 mg IV Lasix. His INR was elevated, for which he received Vit K + total of 3 U of FFP during his course of stay. Pt was started on lactulose and rifaximin for ammonia of 67. He was also given albumin. Also started on protonix.    Hepatic panel was ordered, which revealed positive HBsAg, positive HBeAg and significantly elevated HBV DNA. GI was consulted. Pt to be seen by Dr. Barbara Spencer outpatient for the treatment of HepB. Given his shortness of breath and productive cough with greenish sputum and WBC count of 35.9. Chest x-ray was ordered which revealed extensive opacification of the right hemithorax with pleural fluid and airspace disease. Chest CT with contrast revealed a large multiloculated right pleural effusion. Pt was diagnosed with acute respiratory failure 2/2 PNA and pleural effusion. ID was consulted, pt was started on azithromycin and rocephin. Diagnostic thoracentesis was performed, which revealed transudative effusion. Following this patient's Lasix dosage was increased to 40 IV 3 times daily, per nephrology. Following this, patient's shortness of breath improved. Lasix was increased to 60 IV 3 times daily. Echo was ordered which revealed EF 60-65%. No valvular vegetations were reveled. Patient's hospital course was complicated by a CODE BLUE called on 7/31/2021 for lost pulse. ROSC was achieved after 1 round of CPR and patient became immediately responsive after getting a dose of Narcan. Pt was educated in detail to stop IVDU and smoking. Pt was discharge with home O2 and 5 day course of levaquin per ID. On the date of discharge, the patient reported feeling stable. The patient was found to not be in any acute distress, with vital signs within normal limits, and no new abnormalities on physical examination. Further, the patient expressed appropriate understanding of, and agreement with, the discharge recommendations, medications, and plan. Physical Exam:  /72   Pulse 84   Temp 97.6 °F (36.4 °C) (Oral)   Resp 19   Ht 5' 10\" (1.778 m)   Wt 195 lb 15.8 oz (88.9 kg)   SpO2 94%   BMI 28.12 kg/m²   General appearance: alert, appears stated age and cooperative  Head: Normocephalic, without obvious abnormality, atraumatic  Eyes: conjunctivae/corneas clear. PERRL, EOM's intact.  Fundi benign. Ears: normal TM's and external ear canals both ears  Nose: Nares normal. Septum midline. Mucosa normal. No drainage or sinus tenderness. Throat: lips, mucosa, and tongue normal; teeth and gums normal  Neck: no adenopathy, no carotid bruit, no JVD, supple, symmetrical, trachea midline and thyroid not enlarged, symmetric, no tenderness/mass/nodules  Lungs: clear to auscultation bilaterally  Heart: regular rate and rhythm, S1, S2 normal, no murmur, click, rub or gallop  Abdomen: soft, non-tender; bowel sounds normal; no masses,  no organomegaly  Extremities: extremities normal, atraumatic, no cyanosis or edema  Neurologic: Grossly normal    Consults: ID, nephrology pulmonology   Disposition: home  Discharged Condition: Stable  Follow Up: Primary Care Physician in two weeks    DISCHARGE MEDICATION:     Medication List      START taking these medications    albuterol sulfate  (90 Base) MCG/ACT inhaler  Inhale 2 puffs into the lungs 4 times daily  Notes to patient: Albuterol (Ventolin, Proventil)  USE-  Treats COPD and/or asthma (opens airways, improves breathing)  SIDE EFFECTS-  headache, nervousness, fast heartbeat     Hydrocerin Crea cream  Apply topically 2 times daily     hydrocortisone 1 % ointment  Apply topically 2 times daily.      levoFLOXacin 750 MG tablet  Commonly known as: Levaquin  Take 1 tablet by mouth daily for 5 days  Notes to patient: Levofloxacin  (Levaquin)  USE--  Treat bacterial infection  SIDE EFFECTS--  Upset stomach, diarrhea     ondansetron 4 MG disintegrating tablet  Commonly known as: ZOFRAN-ODT  Take 1 tablet by mouth every 12 hours as needed for Nausea or Vomiting     spironolactone 100 MG tablet  Commonly known as: ALDACTONE  Take 1 tablet by mouth daily     torsemide 100 MG tablet  Commonly known as: DEMADEX  Take 0.5 tablets by mouth daily           Where to Get Your Medications      These medications were sent to [facility]/pharmacy #[id number]- [place], [place] - [address] [facility] [address]    Phone: [phone]   · albuterol sulfate  (90 Base) MCG/ACT inhaler  · hydrocortisone 1 % ointment     You can get these medications from any pharmacy    Bring a paper prescription for each of these medications  · Hydrocerin Crea cream  · levoFLOXacin 750 MG tablet  · ondansetron 4 MG disintegrating tablet  · spironolactone 100 MG tablet  · torsemide 100 MG tablet       Activity: activity as tolerated  Diet: cardiac diet  Wound Care: none needed    Time Spent on discharge is more than 30 minutes    Signed:  Asya Cabrales MD, MD   Internal Medicine, PGY1  8/2/2021      Patient seen and examined, labs and imaging reviewed, agree with assessment and plan as outlined above. patients condition continues to improve doing well, asked patient to monitor side effects of medications which i've discussed at length, recurrence of symptoms or new symptoms including but not limited to chest pain, shortness of breath, nausea, vomiting, fevers or chills and seek immediate medical attention or call 911. Greater than 30 minutes spent on case on day of discharge.      Samantha Brady MD FACP

## 2021-08-02 NOTE — PROGRESS NOTES
Occupational Therapy   Occupational Therapy Initial Assessment and Treatment Note   Date: 2021   Patient Name: Lorrie Camargo  MRN: [id number]     : 1970    Date of Service: 2021    Discharge Recommendations:Glen Chavira scored a 21/24 on the AM-PAC ADL Inpatient form. Current research shows that an AM-PAC score of 18 or greater is typically associated with a discharge to the patient's home setting. Based on the patient's AM-PAC score, and their current ADL deficits, it is recommended that the patient have 2-3 sessions per week of Occupational Therapy at d/c to increase the patient's independence. At this time, this patient demonstrates the endurance and safety to discharge home with [facility] and a follow up treatment frequency of 2-3x/wk. Please see assessment section for further patient specific details. If patient discharges prior to next session this note will serve as a discharge summary. Please see below for the latest assessment towards goals. S Level 1  OT Equipment Recommendations  Equipment Needed: Yes  Other: shower chair    Assessment   Performance deficits / Impairments: Decreased functional mobility ; Decreased endurance;Decreased safe awareness;Decreased ADL status  Assessment: Pt from home with long time roommate. Pt demo functioning below functional baseline - requires 5L o2 with freq desat with minimal activity. Anticipate d/c home with 24hr assist / [facility] when O2 requirements decrease. Will follow as inpt.   Treatment Diagnosis: impaired ADLs/ functional transfers /decreased endurance 2/2 cirrhosis  Prognosis: Fair  Decision Making: Medium Complexity  OT Education: OT Role;Plan of Care;ADL Adaptive Strategies;Transfer Training;Energy Conservation;IADL Safety  Patient Education: verb understanding - reinforce as needed  REQUIRES OT FOLLOW UP: Yes  Activity Tolerance  Activity Tolerance: Patient limited by fatigue  Activity Tolerance: pt demo desats with minimal activity -- O2 sat at rest 93% on 5L, 88% with amb to bathroom, 84% with ambulation in room x 40 feet. Pt needing 3-4 mins / min v.cues for Pursed lip breathing to return to 91-92%. Pt remained on 5L throughout session. Safety Devices  Safety Devices in place: Yes  Type of devices: Chair alarm in place;Call light within reach;Nurse notified; Left in chair           Patient Diagnosis(es): The primary encounter diagnosis was Pleural effusion. Diagnoses of Hypoxia, Hepatitis C virus infection without hepatic coma, unspecified chronicity, Cirrhosis of liver with ascites, unspecified hepatic cirrhosis type ([facility] [address] and Chronic venous stasis were also pertinent to this visit. has no past medical history on file. has no past surgical history on file. Treatment Diagnosis: impaired ADLs/ functional transfers /decreased endurance 2/2 cirrhosis      Restrictions  Position Activity Restriction  Other position/activity restrictions: up with assistance    Subjective   General  Chart Reviewed: Yes  Patient assessed for rehabilitation services?: Yes  Additional Pertinent Hx: Admit 7/25 with Cirrhosis   cxray - Extensive right pleural-parenchymal opacities, CT head -neg, Thoracentesis 7/26, CT Chest -Unchanged large multiloculated right pleural effusion and new small left pleural effusion. PMHX: IVDU, + smoker  Family / Caregiver Present: No  Diagnosis: Cirrhosis  Subjective  Subjective: \"I feel okay ' pt found in bed agreeable for OOB/OT eval and tx.   Patient Currently in Pain: Denies  Pain Assessment  Pain Assessment: 0-10  Pain Level: 7  Londono-Baker Pain Rating: No hurt  Patient's Stated Pain Goal: No pain  Non-Pharmaceutical Pain Intervention(s): Declines  Vital Signs  Patient Currently in Pain: Denies    Social/Functional History  Social/Functional History  Lives With: Friend(s)  Type of Home: Apartment  Home Layout: One level  Home Access: Stairs to enter with rails  Entrance Stairs - Number of Steps: 5 + 17 to second floor  Bathroom Shower/Tub: Tub/Shower unit  Bathroom Toilet: Standard (sink to the right)  Receives Help From: Friend(s)  ADL Assistance: Independent  Homemaking Assistance: Independent (shares with roommate)  Homemaking Responsibilities: Yes  Ambulation Assistance: Independent  Transfer Assistance: Independent  Active : No  Occupation: Unemployed  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 pt states family can assist as needed. no falls       Objective  Treatment included functional transfer training, ADL's and pt. education. Vision: Within Functional Limits  Hearing: Within functional limits    Orientation  Overall Orientation Status: Within Normal Limits       Balance  Sitting Balance: Independent  Standing Balance: Stand by assistance  Standing Balance  Time: 3 mins x 2 and 2 mins x  2  Activity: functional transfers / stance at sink /functional mobility in room / bathroom  Comment: BARRETO noted with minimal activity  Functional Mobility  Functional - Mobility Device: No device  Activity: To/from bathroom; Other  Assist Level: Stand by assistance (to Supervision)  Functional Mobility Comments: Hardy Malone / Christiano Arroyo noted with mobility attempts  Toilet Transfers  Toilet - Technique: Ambulating  Equipment Used: Standard toilet  Toilet Transfer: Modified independent  Toilet Transfers Comments: with rail for steadying assist - has sink at home for leverage  ADL  Feeding: Independent  Grooming: Independent;Setup  LE Dressing: Contact guard assistance;Minimal assistance  Toileting: Contact guard assistance;Stand by assistance  Additional Comments: Pt limited by poor endurance - desats on 5L to 84-85% with minimal activity  Tone RUE  RUE Tone: Normotonic  Tone LUE  LUE Tone: Normotonic  Coordination  Movements Are Fluid And Coordinated: Yes     Bed mobility  Supine to Sit: Supervision  Comment: HOB at 30  Transfers  Sit to stand: Stand by assistance  Stand to sit: Supervision  Transfer Comments: v.cues for slowing down/ safety  Vision - Basic Assessment  Prior Vision: Wears glasses only for reading (cheaters as needed)  Cognition  Overall Cognitive Status: [facility]  Cognition Comment: impulsive at times with mobility attempts    LUE AROM (degrees)  LUE AROM : WFL  Left Hand AROM (degrees)  Left Hand AROM: WFL  RUE AROM (degrees)  RUE AROM : WFL  Right Hand AROM (degrees)  Right Hand AROM: WFL  LUE Strength  Gross LUE Strength: WFL  RUE Strength  Gross RUE Strength: WFL      Plan   Plan  Times per week: 2-5x  Times per day: Daily  Current Treatment Recommendations: Functional Mobility Training, Endurance Training, Safety Education & Training, Self-Care / ADL, Patient/Caregiver Education & Training, Equipment Evaluation, Education, & procurement    AM-PAC Score  AM-[facility] Inpatient Daily Activity Raw Score: 21 (08/02/21 1031)  AM-PAC Inpatient ADL T-Scale Score : 44.27 (08/02/21 1031)  ADL Inpatient CMS 0-100% Score: 32.79 (08/02/21 1031)  ADL Inpatient CMS G-Code Modifier : Juanita Aguillon (08/02/21 1031)    Goals  Short term goals  Time Frame for Short term goals: at d/c  Short term goal 1: Stance x 6 mins with Mod independence for ADLs  Short term goal 2: LE dressing with Supervision / AE prn  Short term goal 3: Verb 3 EC/WS tech to use at home  Short term goal 4: Functional transfers with mod independence  Patient Goals   Patient goals : Go home - feel better     Therapy Time   Individual Concurrent Group Co-treatment   Time In 0920         Time Out 1000         Minutes 40              Timed Code Treatment Minutes:   23 mins     Total Treatment Minutes:  40 mins       Mago Butt MS, OTR/L #[id number]

## 2021-08-02 NOTE — CARE COORDINATION
CTN contacted Kell with [facility] [phone]. They have accepted this patient and will pull referral from [facility]. They will contact patient and make arrangements for [facility].    Electronically signed by Lisandra Kruse LPN on 1/0/6292 at 9:59 PM

## 2021-08-02 NOTE — PROGRESS NOTES
Physical Therapy    Facility/Department: [place] [address] [facility]U  Initial Assessment/Treatment    NAME: Janell Gupta  : 1970  MRN: [id number]    Date of Service: 2021    Discharge Recommendations:    Janell Gupta scored a 20/24 on the AM-PAC short mobility form. Current research shows that an AM-PAC score of 18 or greater is typically associated with a discharge to the patient's home setting. Based on the patient's AM-PAC score and their current functional mobility deficits, it is recommended that the patient have 2-3 sessions per week of Physical Therapy at d/c to increase the patient's independence. At this time, this patient demonstrates the endurance and safety to discharge home with home PT and a follow up treatment frequency of 2-3x/wk. Please see assessment section for further patient specific details. If patient discharges prior to next session this note will serve as a discharge summary. Please see below for the latest assessment towards goals. PT Equipment Recommendations  Equipment Needed: No    Assessment   Body structures, Functions, Activity limitations: Decreased functional mobility ; Decreased endurance  Assessment: Pt functioning below baseline mostly with respect to endurance. Pt easliy fatigued & has multiple stairs to enter home. Pt on 5 L O2  & O2 sat decreasing with minimal activity. Anticipate pt will go home upon D/C. Recommend cont PT while here to maximize independence. Recommend home PT & initial 24-hr assistance. Treatment Diagnosis: decreased mobility & endurance  Prognosis: Good  Decision Making: Low Complexity  PT Education: Goals;PT Role;Plan of Care;General Safety; Energy Conservation  REQUIRES PT FOLLOW UP: Yes  Activity Tolerance  Activity Tolerance: Patient Tolerated treatment well;Patient limited by fatigue;Patient limited by endurance       Patient Diagnosis(es): The primary encounter diagnosis was Pleural effusion.  Diagnoses of Hypoxia, Hepatitis C virus infection without hepatic coma, unspecified chronicity, Cirrhosis of liver with ascites, unspecified hepatic cirrhosis type ([address] and Chronic venous stasis were also pertinent to this visit. has no past medical history on file. has no past surgical history on file. Restrictions  Position Activity Restriction  Other position/activity restrictions: up with assistance  Vision/Hearing  Vision: Within Functional Limits  Hearing: Within functional limits     Subjective  General  Chart Reviewed: Yes  Patient assessed for rehabilitation services?: Yes  Additional Pertinent Hx: PMH: Hep C, IV drug use. Pt admitted with SOB & abdominal pain. Dx: pleural effusion. s/p thoracentesis on 7/27. Family / Caregiver Present: No  Referring Practitioner: Samuel  Referral Date : 07/31/21  Diagnosis: pleural effusion  Follows Commands: Within Functional Limits  Subjective  Subjective: Pt supine in bed & agreeable to PT. Pain Screening  Patient Currently in Pain: Denies  Vital Signs  Patient Currently in Pain: Denies       Orientation  Orientation  Overall Orientation Status: Within Normal Limits  Social/Functional History  Social/Functional History  Lives With: Friend(s)  Type of Home: Apartment  Home Layout: One level  Home Access: Stairs to enter with rails  Entrance Stairs - Number of Steps: 5 + 17 to second floor  Bathroom Shower/Tub: Tub/Shower unit  [employer]: Standard (sink to the right)  Receives Help From: Friend(s)  ADL Assistance: Independent  Homemaking Assistance: Independent (shares with roommate)  Homemaking Responsibilities: Yes  Ambulation Assistance: Independent  Transfer Assistance: Independent  Active : No  Occupation: Unemployed  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 pt states family can assist as needed.  no falls       Objective          AROM RLE (degrees)  RLE AROM: WNL  AROM LLE (degrees)  LLE AROM : WNL  Strength RLE  Comment: not formally tested, appears [place]  Strength LLE  Comment: not formally tested, appears WFL        Bed mobility  Supine to Sit: Supervision (HOB elevated)  Scooting: Supervision (seated)  Transfers  Sit to Stand: Stand by assistance  Stand to sit: Stand by assistance  Ambulation  Ambulation?: Yes  Ambulation 1  Surface: level tile  Device: No Device  Assistance: Stand by assistance  Quality of Gait: steady  Distance: 10 ft x 2, 45 ft  Comments: no LOB. distance limited by SOB. O2 sat 93% at rest, down to 87% after ambulating 10 ft & standing to urinate. down to 84% after ambulating 45 ft. back to 90% after ~5 min seated rest each time. cues for proper breathing technique.   Stairs/Curb  Stairs?: No     Balance  Sitting - Static: Good  Sitting - Dynamic: Good  Standing - Static: Good  Standing - Dynamic: Good        Plan   Plan  Times per week: 2-5  Current Treatment Recommendations: Strengthening, Endurance Training, Gait Training, Stair training, Safety Education & Training, Home Exercise Program  Safety Devices  Type of devices: Call light within reach, Chair alarm in place, Left in chair, Nurse notified                                                   AM-PAC Score  AM-PAC Inpatient Mobility Raw Score : 20 (08/02/21 1044)  AM-PAC Inpatient T-Scale Score : 47.67 (08/02/21 1044)  Mobility Inpatient CMS 0-100% Score: 35.83 (08/02/21 1044)  Mobility Inpatient CMS G-Code Modifier : Hailey Bennett (08/02/21 1044)          Goals  Short term goals  Time Frame for Short term goals: D/C  Short term goal 1: si<->stand IND  Short term goal 2: Amb 75 ft with RW SBA  Short term goal 3: pt will tolerate stair assessment  Patient Goals   Patient goals : to go home       Therapy Time   Individual Concurrent Group Co-treatment   Time In 0927         Time Out 1000         Minutes [address] [place] [address], PT

## 2021-08-03 LAB — AFP: 1.8 UG/L

## 2021-08-04 LAB
AMPHETAMINES SCREEN BLOOD: NEGATIVE NG/ML
BARBITURATES SCREEN BLOOD: NEGATIVE NG/ML
BENZODIAZEPINES SCREEN BLOOD: NEGATIVE NG/ML
BUPRENORPHINE: NEGATIVE NG/ML
CANNABINOID SCREEN BLOOD: POSITIVE NG/ML
COCAINE SCREEN BLOOD: NEGATIVE NG/ML
Lab: NORMAL
METHADONE SCREEN BLOOD: NEGATIVE NG/ML
METHAMPHETAMINES SERUM/ PLASMA: NEGATIVE NG/ML
OPIATES SCREEN BLOOD: NEGATIVE NG/ML
OXYCODONE: NEGATIVE NG/ML
PHENCYCLIDINE SCREEN BLOOD: NEGATIVE NG/ML

## 2021-08-05 ENCOUNTER — OFFICE VISIT (OUTPATIENT)
Dept: INTERNAL MEDICINE CLINIC | Age: 51
End: 2021-08-05
Payer: COMMERCIAL

## 2021-08-05 VITALS
BODY MASS INDEX: 27.98 KG/M2 | OXYGEN SATURATION: 90 % | HEART RATE: 115 BPM | DIASTOLIC BLOOD PRESSURE: 74 MMHG | SYSTOLIC BLOOD PRESSURE: 117 MMHG | WEIGHT: 195 LBS

## 2021-08-05 DIAGNOSIS — R18.8 CIRRHOSIS OF LIVER WITH ASCITES, UNSPECIFIED HEPATIC CIRRHOSIS TYPE (HCC): ICD-10-CM

## 2021-08-05 DIAGNOSIS — R21 SKIN RASH: ICD-10-CM

## 2021-08-05 DIAGNOSIS — R09.02 HYPOXIA: Primary | ICD-10-CM

## 2021-08-05 DIAGNOSIS — K74.60 CIRRHOSIS OF LIVER WITH ASCITES, UNSPECIFIED HEPATIC CIRRHOSIS TYPE (HCC): ICD-10-CM

## 2021-08-05 DIAGNOSIS — B18.2 CHRONIC HEPATITIS C WITHOUT HEPATIC COMA (HCC): ICD-10-CM

## 2021-08-05 DIAGNOSIS — F19.90 IVDU (INTRAVENOUS DRUG USER): ICD-10-CM

## 2021-08-05 PROCEDURE — 99215 OFFICE O/P EST HI 40 MIN: CPT | Performed by: NURSE PRACTITIONER

## 2021-08-05 PROCEDURE — G8419 CALC BMI OUT NRM PARAM NOF/U: HCPCS | Performed by: NURSE PRACTITIONER

## 2021-08-05 PROCEDURE — 3017F COLORECTAL CA SCREEN DOC REV: CPT | Performed by: NURSE PRACTITIONER

## 2021-08-05 PROCEDURE — 1111F DSCHRG MED/CURRENT MED MERGE: CPT | Performed by: NURSE PRACTITIONER

## 2021-08-05 PROCEDURE — 4004F PT TOBACCO SCREEN RCVD TLK: CPT | Performed by: NURSE PRACTITIONER

## 2021-08-05 PROCEDURE — G8427 DOCREV CUR MEDS BY ELIG CLIN: HCPCS | Performed by: NURSE PRACTITIONER

## 2021-08-05 RX ORDER — FUROSEMIDE 40 MG/1
40 TABLET ORAL DAILY
COMMUNITY
End: 2021-08-20

## 2021-08-05 SDOH — ECONOMIC STABILITY: FOOD INSECURITY: WITHIN THE PAST 12 MONTHS, THE FOOD YOU BOUGHT JUST DIDN'T LAST AND YOU DIDN'T HAVE MONEY TO GET MORE.: NEVER TRUE

## 2021-08-05 SDOH — ECONOMIC STABILITY: TRANSPORTATION INSECURITY
IN THE PAST 12 MONTHS, HAS LACK OF TRANSPORTATION KEPT YOU FROM MEETINGS, WORK, OR FROM GETTING THINGS NEEDED FOR DAILY LIVING?: NO

## 2021-08-05 SDOH — ECONOMIC STABILITY: TRANSPORTATION INSECURITY
IN THE PAST 12 MONTHS, HAS THE LACK OF TRANSPORTATION KEPT YOU FROM MEDICAL APPOINTMENTS OR FROM GETTING MEDICATIONS?: NO

## 2021-08-05 SDOH — ECONOMIC STABILITY: FOOD INSECURITY: WITHIN THE PAST 12 MONTHS, YOU WORRIED THAT YOUR FOOD WOULD RUN OUT BEFORE YOU GOT MONEY TO BUY MORE.: NEVER TRUE

## 2021-08-05 ASSESSMENT — PATIENT HEALTH QUESTIONNAIRE - PHQ9
SUM OF ALL RESPONSES TO PHQ QUESTIONS 1-9: 0
SUM OF ALL RESPONSES TO PHQ9 QUESTIONS 1 & 2: 0
1. LITTLE INTEREST OR PLEASURE IN DOING THINGS: 0
SUM OF ALL RESPONSES TO PHQ QUESTIONS 1-9: 0
2. FEELING DOWN, DEPRESSED OR HOPELESS: 0
SUM OF ALL RESPONSES TO PHQ QUESTIONS 1-9: 0

## 2021-08-05 ASSESSMENT — SOCIAL DETERMINANTS OF HEALTH (SDOH): HOW HARD IS IT FOR YOU TO PAY FOR THE VERY BASICS LIKE FOOD, HOUSING, MEDICAL CARE, AND HEATING?: NOT HARD AT ALL

## 2021-08-06 PROBLEM — F19.90 IVDU (INTRAVENOUS DRUG USER): Status: ACTIVE | Noted: 2021-08-06

## 2021-08-06 PROBLEM — R21 SKIN RASH: Status: ACTIVE | Noted: 2021-08-06

## 2021-08-06 PROBLEM — J18.9 PNEUMONIA: Status: ACTIVE | Noted: 2021-08-06

## 2021-08-06 LAB — CANNABINOID GC/MS: 8 NG/ML

## 2021-08-06 ASSESSMENT — ENCOUNTER SYMPTOMS
DIARRHEA: 0
WHEEZING: 0
COUGH: 0
SINUS PAIN: 0
BACK PAIN: 0
CONSTIPATION: 0
SHORTNESS OF BREATH: 1
ABDOMINAL PAIN: 0
COLOR CHANGE: 0
SINUS PRESSURE: 0

## 2021-08-06 NOTE — ASSESSMENT & PLAN NOTE
Improving  Continue Levaquin  Wean O2 as tolerated, sats above 94%  Shortness of breath improving  Follow up in 2 weeks

## 2021-08-06 NOTE — PROGRESS NOTES
Subjective:      Patient ID: Brayan Reece is a 45 y.o. y.o. male. HPI    Patient is here for a new patient visit. She was recently hospitalized for cirrhosis, pneumonia, hepatitis C. see hospital course below. \"Hospital Course:    54-year-old male with past medical history of hepatitis C, IV drug user, no previous medical care presents to the ED with diffuse abdominal pain, emesis and shortness of breath. Patient started to notice bloating of the abdominal since a year. He started noticing right lower quadrant pain which was intermittent, diffuse, achy, 5/10 in intensity, non-radiating with no aggravating or relieving factors. He also reported a couple of months of history of shortness of breath, worsening with exacerbation. On admission he denied any chest pain associated with shortness of breath, palpitations. He denied any fevers, chills. On admission ROS was positive for productive cough with greenish sputum and bilateral lower extremity rash. Of note, patient reported having intermittent scrotal swelling for 3 months.     Patient was admitted for the concern of   decompensated cirrhosis with ascites secondary to chronic hepatitis C. patient was started on 100 mg spironolactone and 40 mg IV Lasix. His INR was elevated, for which he received Vit K + total of 3 U of FFP during his course of stay. Pt was started on lactulose and rifaximin for ammonia of 67. He was also given albumin. Also started on protonix. Hepatic panel was ordered, which revealed positive HBsAg, positive HBeAg and significantly elevated HBV DNA. GI was consulted. Pt to be seen by Dr. Mitul Sands outpatient for the treatment of HepB. Given his shortness of breath and productive cough with greenish sputum and WBC count of 35.9. Chest x-ray was ordered which revealed extensive opacification of the right hemithorax with pleural fluid and airspace disease.   Chest CT with contrast revealed a large multiloculated right pleural surgical history on file. Social History     Socioeconomic History    Marital status: Single     Spouse name: Not on file    Number of children: Not on file    Years of education: Not on file    Highest education level: Not on file   Occupational History    Not on file   Tobacco Use    Smoking status: Current Every Day Smoker     Packs/day: 1.00     Years: 39.00     Pack years: 39.00     Types: Cigarettes     Start date: 35    Smokeless tobacco: Never Used   Vaping Use    Vaping Use: Never used   Substance and Sexual Activity    Alcohol use: Never    Drug use: Yes     Types: IV     Comment: fentanyl    Sexual activity: Not on file   Other Topics Concern    Not on file   Social History Narrative    Not on file     Social Determinants of Health     Financial Resource Strain: Low Risk     Difficulty of Paying Living Expenses: Not hard at all   Food Insecurity: No Food Insecurity    Worried About [address] in the Last Year: Never true    [facility] [employer] in the Last Year: Never true   Transportation Needs: No Transportation Needs    Lack of Transportation (Medical): No    Lack of Transportation (Non-Medical): No   Physical Activity:     Days of Exercise per Week:     Minutes of Exercise per Session:    Stress:     Feeling of Stress :    Social Connections:     Frequency of Communication with Friends and Family:     Frequency of Social Gatherings with Friends and Family:     Attends [employer] Services:     Active Member of Clubs or Organizations:     Attends Club or Organization Meetings:     Marital Status:    Intimate Partner Violence:     Fear of Current or Ex-Partner:     Emotionally Abused:     Physically Abused:     Sexually Abused:        No family history on file.     Vitals:    08/05/21 1332   BP: 117/74   Pulse: 115   SpO2: 90%       Wt Readings from Last 3 Encounters:   08/05/21 195 lb (88.5 kg)   08/02/21 195 lb 15.8 oz (88.9 kg)       Review of Systems Constitutional: Positive for fatigue. Negative for chills and fever. HENT: Negative for congestion, sinus pressure and sinus pain. Respiratory: Positive for shortness of breath. Negative for cough and wheezing. Cardiovascular: Positive for leg swelling. Negative for chest pain and palpitations. Gastrointestinal: Negative for abdominal pain, constipation and diarrhea. Musculoskeletal: Negative for arthralgias, back pain and myalgias. Skin: Positive for rash. Negative for color change and pallor. Neurological: Negative for dizziness, syncope, weakness, light-headedness and headaches. Psychiatric/Behavioral: Negative for behavioral problems, confusion and sleep disturbance. The patient is not nervous/anxious. Objective:   Physical Exam  Constitutional:       Appearance: He is well-developed. HENT:      Head: Normocephalic and atraumatic. Eyes:      Conjunctiva/sclera: Conjunctivae normal.      Pupils: Pupils are equal, round, and reactive to light. Neck:      Thyroid: No thyromegaly. Vascular: No JVD. Cardiovascular:      Rate and Rhythm: Normal rate and regular rhythm. Heart sounds: Normal heart sounds. Pulmonary:      Effort: Pulmonary effort is normal. No respiratory distress. Breath sounds: Normal breath sounds. No wheezing. Musculoskeletal:         General: No deformity. Normal range of motion. Cervical back: Normal range of motion and neck supple. Skin:     General: Skin is warm and dry. Capillary Refill: Capillary refill takes less than 2 seconds. Findings: Rash (BLE) present. Rash is crusting and scaling. Comments: Lichenification   Neurological:      Mental Status: He is alert and oriented to person, place, and time.    Psychiatric:         Behavior: Behavior normal.       Assessment:      See Problem List assessment and plan       Plan:      Hepatitis C virus infection without hepatic coma  Referred to Dr. Franklin Ser  No ascites  Skin and sclera jaundice  Follow-up with GI as planned    Pneumonia  Improving  Continue Levaquin  Wean O2 as tolerated, sats above 94%  Shortness of breath improving  Follow up in 2 weeks     Hypoxia  Remains on 4 L of O2  Wean sats as tolerated  Continue Levaquin    Skin rash  ? Complication of HepC/ HepB   ? Chronic venous statis   HIV screening negative   Improving slightly with hydrocortisone   Consider wound care referral     IVDU (intravenous drug user)  Counseled on full cessation   He is doing well so far   Declines community referrals         Patient engaged in shared decision making. Information given to evaluate options of treatment, understand what is needed and discussimportance of following plan.

## 2021-08-06 NOTE — ASSESSMENT & PLAN NOTE
? Complication of HepC/ HepB   ?  Chronic venous statis   HIV screening negative   Improving slightly with hydrocortisone   Consider wound care referral

## 2021-08-09 ENCOUNTER — TELEPHONE (OUTPATIENT)
Dept: INTERNAL MEDICINE CLINIC | Age: 51
End: 2021-08-09

## 2021-08-09 NOTE — TELEPHONE ENCOUNTER
Pt would like his pulse ox to go to   [address] [place] [address] Drive [address] [facility],[address] [phone] - F [phone] instead of [facility]    Please contact once sent

## 2021-08-17 NOTE — ED TRIAGE NOTES
Pt states he feels sick, has been having abd/scrotum pain that he rates 11/10. Pt also has a rash on bilateral legs that he states he has no idea what it is as he hasnt been to the doctor in years. Pt does admit to injecting heroin yesterday. Pt does not use oxygen at home, o2 sat 89% on room air.  Pt only came to hospital due to niece calling [phone]
Accidental fall

## 2021-08-19 ENCOUNTER — OFFICE VISIT (OUTPATIENT)
Dept: INTERNAL MEDICINE CLINIC | Age: 51
End: 2021-08-19
Payer: COMMERCIAL

## 2021-08-19 ENCOUNTER — TELEPHONE (OUTPATIENT)
Dept: INTERNAL MEDICINE CLINIC | Age: 51
End: 2021-08-19

## 2021-08-19 VITALS
DIASTOLIC BLOOD PRESSURE: 62 MMHG | SYSTOLIC BLOOD PRESSURE: 118 MMHG | HEART RATE: 99 BPM | BODY MASS INDEX: 29.99 KG/M2 | OXYGEN SATURATION: 95 % | WEIGHT: 209 LBS

## 2021-08-19 DIAGNOSIS — K74.60 CIRRHOSIS OF LIVER WITH ASCITES, UNSPECIFIED HEPATIC CIRRHOSIS TYPE (HCC): ICD-10-CM

## 2021-08-19 DIAGNOSIS — R21 SKIN RASH: ICD-10-CM

## 2021-08-19 DIAGNOSIS — J18.9 PNEUMONIA DUE TO INFECTIOUS ORGANISM, UNSPECIFIED LATERALITY, UNSPECIFIED PART OF LUNG: ICD-10-CM

## 2021-08-19 DIAGNOSIS — R18.8 CIRRHOSIS OF LIVER WITH ASCITES, UNSPECIFIED HEPATIC CIRRHOSIS TYPE (HCC): ICD-10-CM

## 2021-08-19 PROCEDURE — 99214 OFFICE O/P EST MOD 30 MIN: CPT | Performed by: NURSE PRACTITIONER

## 2021-08-19 PROCEDURE — G8419 CALC BMI OUT NRM PARAM NOF/U: HCPCS | Performed by: NURSE PRACTITIONER

## 2021-08-19 PROCEDURE — 4004F PT TOBACCO SCREEN RCVD TLK: CPT | Performed by: NURSE PRACTITIONER

## 2021-08-19 PROCEDURE — 3017F COLORECTAL CA SCREEN DOC REV: CPT | Performed by: NURSE PRACTITIONER

## 2021-08-19 PROCEDURE — G8427 DOCREV CUR MEDS BY ELIG CLIN: HCPCS | Performed by: NURSE PRACTITIONER

## 2021-08-19 PROCEDURE — 1111F DSCHRG MED/CURRENT MED MERGE: CPT | Performed by: NURSE PRACTITIONER

## 2021-08-19 RX ORDER — DIAPER,BRIEF,INFANT-TODD,DISP
EACH MISCELLANEOUS
Qty: 30 G | Refills: 0 | Status: CANCELLED | OUTPATIENT
Start: 2021-08-19

## 2021-08-19 RX ORDER — DIAPER,BRIEF,INFANT-TODD,DISP
EACH MISCELLANEOUS
Qty: 453.6 G | Refills: 1 | Status: SHIPPED | OUTPATIENT
Start: 2021-08-19 | End: 2021-08-26

## 2021-08-19 ASSESSMENT — ENCOUNTER SYMPTOMS
ABDOMINAL PAIN: 0
ABDOMINAL DISTENTION: 1
COLOR CHANGE: 0
VOMITING: 0
CONSTIPATION: 0
SINUS PRESSURE: 0
DIARRHEA: 0
SINUS PAIN: 0
WHEEZING: 0
NAUSEA: 0
SHORTNESS OF BREATH: 0
BACK PAIN: 0
COUGH: 0

## 2021-08-19 NOTE — PROGRESS NOTES
Janell Gupta (:  1970) is a 45 y.o. male,Established patient, here for evaluation of the following chief complaint(s):  2 Week Follow-Up (Would like bigger jar of hydrocortisone cream)      ASSESSMENT/PLAN:  1. Skin rash  Assessment & Plan:  Slightly improved   Continue hydrocortisone   2. Cirrhosis of liver with ascites, unspecified hepatic cirrhosis type [facility])  Assessment & Plan:  Appointment scheduled with GI   Mild ascites noted  Reviewed labs   3. Pneumonia due to infectious organism, unspecified laterality, unspecified part of lung  Assessment & Plan:  Completed levaquin   Off oxygen   SOB resolved   Call if symptoms worsen or fail to improve       No follow-ups on file. SUBJECTIVE/OBJECTIVE :  HPI  Patient is here for follow-up of his pneumonia. He is doing well. He completed his antibiotics. He is off oxygen. Denies shortness of breath. Denies fevers. He taking his medications as prescribed. Tolerating well. He has an appointment scheduled with GI later this month. He has noticed a slight increase in his abdominal distention. Denies abdominal pain. His rash to his BLE is improving. Hydrocortisone cream is helping. No open lesions or weeping. Current Outpatient Medications   Medication Sig Dispense Refill    hydrocortisone 1 % cream Apply topically 2 times daily. 453.6 g 1    furosemide (LASIX) 40 MG tablet Take 40 mg by mouth daily      albuterol sulfate  (90 Base) MCG/ACT inhaler Inhale 2 puffs into the lungs 4 times daily 1 Inhaler 3    Skin Protectants, Misc. (HYDROCERIN) CREA cream Apply topically 2 times daily 1 Container 0    spironolactone (ALDACTONE) 100 MG tablet Take 1 tablet by mouth daily 30 tablet 3    torsemide (DEMADEX) 100 MG tablet Take 0.5 tablets by mouth daily 30 tablet 3     No current facility-administered medications for this visit. Review of Systems   Constitutional: Negative for chills, fatigue and fever.    HENT: Negative for congestion, sinus pressure and sinus pain. Respiratory: Negative for cough, shortness of breath and wheezing. Cardiovascular: Negative for chest pain and palpitations. Gastrointestinal: Positive for abdominal distention. Negative for abdominal pain, constipation, diarrhea, nausea and vomiting. Musculoskeletal: Negative for arthralgias, back pain and myalgias. Skin: Positive for rash (BLE). Negative for color change and pallor. Neurological: Negative for dizziness, syncope, weakness, light-headedness and headaches. Psychiatric/Behavioral: Negative for behavioral problems, confusion and sleep disturbance. The patient is not nervous/anxious. Vitals:    08/19/21 1312   BP: 118/62   Site: Left Upper Arm   Position: Sitting   Cuff Size: Large Adult   Pulse: 99   SpO2: 95%   Weight: 209 lb (94.8 kg)       Physical Exam  Constitutional:       Appearance: He is well-developed. HENT:      Head: Normocephalic and atraumatic. Eyes:      Conjunctiva/sclera: Conjunctivae normal.      Pupils: Pupils are equal, round, and reactive to light. Neck:      Thyroid: No thyromegaly. Vascular: No JVD. Cardiovascular:      Rate and Rhythm: Normal rate and regular rhythm. Heart sounds: Normal heart sounds. Pulmonary:      Effort: Pulmonary effort is normal. No respiratory distress. Breath sounds: Normal breath sounds. No wheezing. Abdominal:      General: Bowel sounds are normal.      Palpations: Abdomen is soft. Musculoskeletal:         General: No deformity. Normal range of motion. Cervical back: Normal range of motion and neck supple. Skin:     General: Skin is warm and dry. Capillary Refill: Capillary refill takes less than 2 seconds. Findings: Rash present. Rash is scaling. Comments: Lichenification- slightly improved    Neurological:      Mental Status: He is alert and oriented to person, place, and time.    Psychiatric:         Behavior: Behavior normal.         An electronic signature was used to authenticate this note.     --Alireza Villavicencio, APRMARCOS - CNP

## 2021-08-19 NOTE — TELEPHONE ENCOUNTER
----- Message from Earline Hsu sent at 8/19/2021  2:35 PM EDT -----  Subject: Message to Provider    QUESTIONS  Information for Provider? Patient called in to give list of medications 1.   torsemide 100 mg 2. spironolactone 100 mg   ---------------------------------------------------------------------------  --------------  CALL BACK INFO  What is the best way for the office to contact you? OK to leave message on   voicemail  Preferred Call Back Phone Number? [phone]  ---------------------------------------------------------------------------  --------------  SCRIPT ANSWERS  Relationship to Patient?  Self

## 2021-08-24 ENCOUNTER — TELEPHONE (OUTPATIENT)
Dept: INTERNAL MEDICINE CLINIC | Age: 51
End: 2021-08-24

## 2021-08-24 NOTE — TELEPHONE ENCOUNTER
hydrocortisone 1 % cream and oximeter were sent to the wrong pharmacy   Pt would like these to go to   [facility], [address] [place] [address], [address] - F [phone]

## 2021-09-07 ENCOUNTER — APPOINTMENT (OUTPATIENT)
Dept: CT IMAGING | Age: 51
DRG: 283 | End: 2021-09-07
Payer: COMMERCIAL

## 2021-09-07 ENCOUNTER — HOSPITAL ENCOUNTER (INPATIENT)
Age: 51
LOS: 2 days | Discharge: HOME HEALTH | DRG: 283 | End: 2021-09-10
Attending: EMERGENCY MEDICINE | Admitting: INTERNAL MEDICINE
Payer: COMMERCIAL

## 2021-09-07 ENCOUNTER — APPOINTMENT (OUTPATIENT)
Dept: GENERAL RADIOLOGY | Age: 51
DRG: 283 | End: 2021-09-07
Payer: COMMERCIAL

## 2021-09-07 DIAGNOSIS — R41.82 ALTERED MENTAL STATUS, UNSPECIFIED ALTERED MENTAL STATUS TYPE: ICD-10-CM

## 2021-09-07 DIAGNOSIS — J90 PLEURAL EFFUSION: Primary | ICD-10-CM

## 2021-09-07 DIAGNOSIS — E72.20 HYPERAMMONEMIA (HCC): ICD-10-CM

## 2021-09-07 LAB
ALBUMIN SERPL-MCNC: 2.7 G/DL (ref 3.4–5)
ALP BLD-CCNC: 92 U/L (ref 40–129)
ALT SERPL-CCNC: 46 U/L (ref 10–40)
AMMONIA: 141 UMOL/L (ref 16–60)
ANION GAP SERPL CALCULATED.3IONS-SCNC: 8 MMOL/L (ref 3–16)
APTT: 21.7 SEC (ref 26.2–38.6)
AST SERPL-CCNC: 187 U/L (ref 15–37)
BASE EXCESS VENOUS: 10.5 MMOL/L (ref -2–3)
BASOPHILS ABSOLUTE: 0.1 K/UL (ref 0–0.2)
BASOPHILS RELATIVE PERCENT: 0.5 %
BILIRUB SERPL-MCNC: 8 MG/DL (ref 0–1)
BILIRUBIN DIRECT: 2.2 MG/DL (ref 0–0.3)
BILIRUBIN, INDIRECT: 5.8 MG/DL (ref 0–1)
BUN BLDV-MCNC: 30 MG/DL (ref 7–20)
CALCIUM SERPL-MCNC: 8.3 MG/DL (ref 8.3–10.6)
CARBOXYHEMOGLOBIN: 3.7 % (ref 0–1.5)
CHLORIDE BLD-SCNC: 89 MMOL/L (ref 99–110)
CO2: 29 MMOL/L (ref 21–32)
CREAT SERPL-MCNC: 0.7 MG/DL (ref 0.9–1.3)
EOSINOPHILS ABSOLUTE: 0.2 K/UL (ref 0–0.6)
EOSINOPHILS RELATIVE PERCENT: 1.8 %
ETHANOL: NORMAL MG/DL (ref 0–0.08)
GFR AFRICAN AMERICAN: >60
GFR NON-AFRICAN AMERICAN: >60
GLUCOSE BLD-MCNC: 79 MG/DL (ref 70–99)
HCO3 VENOUS: 35.6 MMOL/L (ref 24–28)
HCT VFR BLD CALC: 34.6 % (ref 40.5–52.5)
HEMOGLOBIN, VEN, REDUCED: 25.8 %
HEMOGLOBIN: 11.7 G/DL (ref 13.5–17.5)
INR BLD: 1.61 (ref 0.88–1.12)
LACTIC ACID: 2.4 MMOL/L (ref 0.4–2)
LIPASE: 34 U/L (ref 13–60)
LYMPHOCYTES ABSOLUTE: 3 K/UL (ref 1–5.1)
LYMPHOCYTES RELATIVE PERCENT: 25 %
MCH RBC QN AUTO: 34.6 PG (ref 26–34)
MCHC RBC AUTO-ENTMCNC: 33.7 G/DL (ref 31–36)
MCV RBC AUTO: 102.7 FL (ref 80–100)
METHEMOGLOBIN VENOUS: <0 % (ref 0–1.5)
MONOCYTES ABSOLUTE: 1.2 K/UL (ref 0–1.3)
MONOCYTES RELATIVE PERCENT: 10.5 %
NEUTROPHILS ABSOLUTE: 7.4 K/UL (ref 1.7–7.7)
NEUTROPHILS RELATIVE PERCENT: 62.2 %
O2 SAT, VEN: 73 %
PCO2, VEN: 47.1 MMHG (ref 41–51)
PDW BLD-RTO: 16.9 % (ref 12.4–15.4)
PH VENOUS: 7.49 (ref 7.35–7.45)
PLATELET # BLD: 102 K/UL (ref 135–450)
PMV BLD AUTO: 8.8 FL (ref 5–10.5)
PO2, VEN: 41.5 MMHG (ref 25–40)
POTASSIUM REFLEX MAGNESIUM: 8 MMOL/L (ref 3.5–5.1)
PRO-BNP: 365 PG/ML (ref 0–124)
PROTHROMBIN TIME: 18.5 SEC (ref 9.9–12.7)
RBC # BLD: 3.37 M/UL (ref 4.2–5.9)
SODIUM BLD-SCNC: 126 MMOL/L (ref 136–145)
TCO2 CALC VENOUS: 37 MMOL/L
TOTAL PROTEIN: 7.3 G/DL (ref 6.4–8.2)
TROPONIN: 0.1 NG/ML
WBC # BLD: 11.8 K/UL (ref 4–11)

## 2021-09-07 PROCEDURE — 85730 THROMBOPLASTIN TIME PARTIAL: CPT

## 2021-09-07 PROCEDURE — 70450 CT HEAD/BRAIN W/O DYE: CPT

## 2021-09-07 PROCEDURE — 83690 ASSAY OF LIPASE: CPT

## 2021-09-07 PROCEDURE — 83605 ASSAY OF LACTIC ACID: CPT

## 2021-09-07 PROCEDURE — 99285 EMERGENCY DEPT VISIT HI MDM: CPT

## 2021-09-07 PROCEDURE — 83880 ASSAY OF NATRIURETIC PEPTIDE: CPT

## 2021-09-07 PROCEDURE — 85610 PROTHROMBIN TIME: CPT

## 2021-09-07 PROCEDURE — 82140 ASSAY OF AMMONIA: CPT

## 2021-09-07 PROCEDURE — 80076 HEPATIC FUNCTION PANEL: CPT

## 2021-09-07 PROCEDURE — 82803 BLOOD GASES ANY COMBINATION: CPT

## 2021-09-07 PROCEDURE — 85025 COMPLETE CBC W/AUTO DIFF WBC: CPT

## 2021-09-07 PROCEDURE — 84484 ASSAY OF TROPONIN QUANT: CPT

## 2021-09-07 PROCEDURE — 80048 BASIC METABOLIC PNL TOTAL CA: CPT

## 2021-09-07 PROCEDURE — 82077 ASSAY SPEC XCP UR&BREATH IA: CPT

## 2021-09-07 PROCEDURE — 71045 X-RAY EXAM CHEST 1 VIEW: CPT

## 2021-09-07 PROCEDURE — 93005 ELECTROCARDIOGRAM TRACING: CPT | Performed by: PHYSICIAN ASSISTANT

## 2021-09-07 PROCEDURE — 36415 COLL VENOUS BLD VENIPUNCTURE: CPT

## 2021-09-08 ENCOUNTER — APPOINTMENT (OUTPATIENT)
Dept: ULTRASOUND IMAGING | Age: 51
DRG: 283 | End: 2021-09-08
Payer: COMMERCIAL

## 2021-09-08 ENCOUNTER — APPOINTMENT (OUTPATIENT)
Dept: CT IMAGING | Age: 51
DRG: 283 | End: 2021-09-08
Payer: COMMERCIAL

## 2021-09-08 PROBLEM — G93.40 ENCEPHALOPATHY ACUTE: Status: ACTIVE | Noted: 2021-09-08

## 2021-09-08 LAB
ALBUMIN SERPL-MCNC: 2.2 G/DL (ref 3.4–5)
ALBUMIN SERPL-MCNC: 2.4 G/DL (ref 3.4–5)
ALP BLD-CCNC: 106 U/L (ref 40–129)
ALT SERPL-CCNC: 29 U/L (ref 10–40)
AMMONIA: 115 UMOL/L (ref 16–60)
ANION GAP SERPL CALCULATED.3IONS-SCNC: 8 MMOL/L (ref 3–16)
AST SERPL-CCNC: 74 U/L (ref 15–37)
BACTERIA: ABNORMAL /HPF
BILIRUB SERPL-MCNC: 7.6 MG/DL (ref 0–1)
BILIRUBIN DIRECT: 2.8 MG/DL (ref 0–0.3)
BILIRUBIN URINE: ABNORMAL
BILIRUBIN, INDIRECT: 4.8 MG/DL (ref 0–1)
BLOOD, URINE: ABNORMAL
BUN BLDV-MCNC: 28 MG/DL (ref 7–20)
CALCIUM SERPL-MCNC: 8.3 MG/DL (ref 8.3–10.6)
CHLORIDE BLD-SCNC: 96 MMOL/L (ref 99–110)
CLARITY: CLEAR
CO2: 30 MMOL/L (ref 21–32)
COLOR: YELLOW
CREAT SERPL-MCNC: 0.8 MG/DL (ref 0.9–1.3)
EKG ATRIAL RATE: 87 BPM
EKG DIAGNOSIS: NORMAL
EKG P AXIS: 44 DEGREES
EKG P-R INTERVAL: 142 MS
EKG Q-T INTERVAL: 390 MS
EKG QRS DURATION: 84 MS
EKG QTC CALCULATION (BAZETT): 469 MS
EKG R AXIS: 42 DEGREES
EKG T AXIS: 21 DEGREES
EKG VENTRICULAR RATE: 87 BPM
EPITHELIAL CELLS, UA: ABNORMAL /HPF (ref 0–5)
GFR AFRICAN AMERICAN: >60
GFR NON-AFRICAN AMERICAN: >60
GLUCOSE BLD-MCNC: 75 MG/DL (ref 70–99)
GLUCOSE URINE: NEGATIVE MG/DL
HCT VFR BLD CALC: 30.5 % (ref 40.5–52.5)
HEMOGLOBIN: 10.3 G/DL (ref 13.5–17.5)
KETONES, URINE: NEGATIVE MG/DL
LACTIC ACID: 1.6 MMOL/L (ref 0.4–2)
LACTIC ACID: 1.8 MMOL/L (ref 0.4–2)
LEUKOCYTE ESTERASE, URINE: NEGATIVE
MAGNESIUM: 2 MG/DL (ref 1.8–2.4)
MCH RBC QN AUTO: 34.6 PG (ref 26–34)
MCHC RBC AUTO-ENTMCNC: 33.8 G/DL (ref 31–36)
MCV RBC AUTO: 102.3 FL (ref 80–100)
MICROSCOPIC EXAMINATION: YES
NITRITE, URINE: NEGATIVE
OSMOLALITY URINE: 419 MOSM/KG (ref 390–1070)
OSMOLALITY: 289 MOSM/KG (ref 278–305)
PDW BLD-RTO: 16.3 % (ref 12.4–15.4)
PH UA: 5 (ref 5–8)
PHOSPHORUS: 3.4 MG/DL (ref 2.5–4.9)
PLATELET # BLD: 73 K/UL (ref 135–450)
PLATELET # BLD: ABNORMAL K/UL (ref 135–450)
PLATELET SLIDE REVIEW: ABNORMAL
PMV BLD AUTO: ABNORMAL FL (ref 5–10.5)
POTASSIUM SERPL-SCNC: 3.3 MMOL/L (ref 3.5–5.1)
POTASSIUM SERPL-SCNC: 3.9 MMOL/L (ref 3.5–5.1)
PROTEIN UA: NEGATIVE MG/DL
RBC # BLD: 2.98 M/UL (ref 4.2–5.9)
RBC UA: ABNORMAL /HPF (ref 0–4)
SODIUM BLD-SCNC: 133 MMOL/L (ref 136–145)
SODIUM BLD-SCNC: 134 MMOL/L (ref 136–145)
SODIUM BLD-SCNC: 135 MMOL/L (ref 136–145)
SODIUM BLD-SCNC: 136 MMOL/L (ref 136–145)
SODIUM URINE: <20 MMOL/L
SPECIFIC GRAVITY UA: 1.01 (ref 1–1.03)
TOTAL PROTEIN: 6.3 G/DL (ref 6.4–8.2)
TROPONIN: 0.09 NG/ML
URINE REFLEX TO CULTURE: ABNORMAL
URINE TYPE: ABNORMAL
UROBILINOGEN, URINE: 4 E.U./DL
WBC # BLD: 9.6 K/UL (ref 4–11)
WBC UA: ABNORMAL /HPF (ref 0–5)

## 2021-09-08 PROCEDURE — 83930 ASSAY OF BLOOD OSMOLALITY: CPT

## 2021-09-08 PROCEDURE — 6370000000 HC RX 637 (ALT 250 FOR IP): Performed by: STUDENT IN AN ORGANIZED HEALTH CARE EDUCATION/TRAINING PROGRAM

## 2021-09-08 PROCEDURE — 87040 BLOOD CULTURE FOR BACTERIA: CPT

## 2021-09-08 PROCEDURE — 36415 COLL VENOUS BLD VENIPUNCTURE: CPT

## 2021-09-08 PROCEDURE — 80076 HEPATIC FUNCTION PANEL: CPT

## 2021-09-08 PROCEDURE — 83935 ASSAY OF URINE OSMOLALITY: CPT

## 2021-09-08 PROCEDURE — 99223 1ST HOSP IP/OBS HIGH 75: CPT | Performed by: INTERNAL MEDICINE

## 2021-09-08 PROCEDURE — 1200000000 HC SEMI PRIVATE

## 2021-09-08 PROCEDURE — 84300 ASSAY OF URINE SODIUM: CPT

## 2021-09-08 PROCEDURE — 85049 AUTOMATED PLATELET COUNT: CPT

## 2021-09-08 PROCEDURE — 82140 ASSAY OF AMMONIA: CPT

## 2021-09-08 PROCEDURE — 83605 ASSAY OF LACTIC ACID: CPT

## 2021-09-08 PROCEDURE — 6360000002 HC RX W HCPCS: Performed by: STUDENT IN AN ORGANIZED HEALTH CARE EDUCATION/TRAINING PROGRAM

## 2021-09-08 PROCEDURE — 83735 ASSAY OF MAGNESIUM: CPT

## 2021-09-08 PROCEDURE — 85027 COMPLETE CBC AUTOMATED: CPT

## 2021-09-08 PROCEDURE — 6360000004 HC RX CONTRAST MEDICATION: Performed by: EMERGENCY MEDICINE

## 2021-09-08 PROCEDURE — 6370000000 HC RX 637 (ALT 250 FOR IP): Performed by: PHYSICIAN ASSISTANT

## 2021-09-08 PROCEDURE — 84132 ASSAY OF SERUM POTASSIUM: CPT

## 2021-09-08 PROCEDURE — 81001 URINALYSIS AUTO W/SCOPE: CPT

## 2021-09-08 PROCEDURE — 84295 ASSAY OF SERUM SODIUM: CPT

## 2021-09-08 PROCEDURE — 2580000003 HC RX 258: Performed by: STUDENT IN AN ORGANIZED HEALTH CARE EDUCATION/TRAINING PROGRAM

## 2021-09-08 PROCEDURE — 71260 CT THORAX DX C+: CPT

## 2021-09-08 PROCEDURE — 84484 ASSAY OF TROPONIN QUANT: CPT

## 2021-09-08 PROCEDURE — 80069 RENAL FUNCTION PANEL: CPT

## 2021-09-08 PROCEDURE — 93010 ELECTROCARDIOGRAM REPORT: CPT | Performed by: INTERNAL MEDICINE

## 2021-09-08 RX ORDER — POLYETHYLENE GLYCOL 3350 17 G/17G
17 POWDER, FOR SOLUTION ORAL DAILY PRN
Status: DISCONTINUED | OUTPATIENT
Start: 2021-09-08 | End: 2021-09-10 | Stop reason: HOSPADM

## 2021-09-08 RX ORDER — DEXTROSE MONOHYDRATE 50 MG/ML
INJECTION, SOLUTION INTRAVENOUS CONTINUOUS
Status: DISCONTINUED | OUTPATIENT
Start: 2021-09-08 | End: 2021-09-08

## 2021-09-08 RX ORDER — ACETAMINOPHEN 325 MG/1
650 TABLET ORAL EVERY 6 HOURS PRN
Status: DISCONTINUED | OUTPATIENT
Start: 2021-09-08 | End: 2021-09-10 | Stop reason: HOSPADM

## 2021-09-08 RX ORDER — LACTULOSE 10 G/15ML
20 SOLUTION ORAL 3 TIMES DAILY
Status: DISCONTINUED | OUTPATIENT
Start: 2021-09-08 | End: 2021-09-10 | Stop reason: HOSPADM

## 2021-09-08 RX ORDER — SODIUM CHLORIDE 0.9 % (FLUSH) 0.9 %
5-40 SYRINGE (ML) INJECTION EVERY 12 HOURS SCHEDULED
Status: DISCONTINUED | OUTPATIENT
Start: 2021-09-08 | End: 2021-09-10 | Stop reason: HOSPADM

## 2021-09-08 RX ORDER — ONDANSETRON 2 MG/ML
4 INJECTION INTRAMUSCULAR; INTRAVENOUS EVERY 6 HOURS PRN
Status: DISCONTINUED | OUTPATIENT
Start: 2021-09-08 | End: 2021-09-10 | Stop reason: HOSPADM

## 2021-09-08 RX ORDER — FUROSEMIDE 10 MG/ML
40 INJECTION INTRAMUSCULAR; INTRAVENOUS 2 TIMES DAILY
Status: DISCONTINUED | OUTPATIENT
Start: 2021-09-09 | End: 2021-09-10 | Stop reason: HOSPADM

## 2021-09-08 RX ORDER — SODIUM CHLORIDE 9 MG/ML
25 INJECTION, SOLUTION INTRAVENOUS PRN
Status: DISCONTINUED | OUTPATIENT
Start: 2021-09-08 | End: 2021-09-10 | Stop reason: HOSPADM

## 2021-09-08 RX ORDER — ALBUTEROL SULFATE 2.5 MG/3ML
2.5 SOLUTION RESPIRATORY (INHALATION) 4 TIMES DAILY
Status: DISCONTINUED | OUTPATIENT
Start: 2021-09-08 | End: 2021-09-10 | Stop reason: HOSPADM

## 2021-09-08 RX ORDER — ONDANSETRON 4 MG/1
4 TABLET, ORALLY DISINTEGRATING ORAL EVERY 8 HOURS PRN
Status: DISCONTINUED | OUTPATIENT
Start: 2021-09-08 | End: 2021-09-10 | Stop reason: HOSPADM

## 2021-09-08 RX ORDER — ALBUTEROL SULFATE 2.5 MG/3ML
2.5 SOLUTION RESPIRATORY (INHALATION) EVERY 4 HOURS PRN
Status: DISCONTINUED | OUTPATIENT
Start: 2021-09-08 | End: 2021-09-08

## 2021-09-08 RX ORDER — ACETAMINOPHEN 650 MG/1
650 SUPPOSITORY RECTAL EVERY 6 HOURS PRN
Status: DISCONTINUED | OUTPATIENT
Start: 2021-09-08 | End: 2021-09-10 | Stop reason: HOSPADM

## 2021-09-08 RX ORDER — SODIUM CHLORIDE 0.9 % (FLUSH) 0.9 %
5-40 SYRINGE (ML) INJECTION PRN
Status: DISCONTINUED | OUTPATIENT
Start: 2021-09-08 | End: 2021-09-10 | Stop reason: HOSPADM

## 2021-09-08 RX ORDER — POTASSIUM CHLORIDE 20 MEQ/1
20 TABLET, EXTENDED RELEASE ORAL ONCE
Status: COMPLETED | OUTPATIENT
Start: 2021-09-08 | End: 2021-09-08

## 2021-09-08 RX ADMIN — DEXTROSE MONOHYDRATE 1000 MG: 5 INJECTION INTRAVENOUS at 15:48

## 2021-09-08 RX ADMIN — LACTULOSE: 10 SOLUTION ORAL at 15:25

## 2021-09-08 RX ADMIN — POTASSIUM CHLORIDE 20 MEQ: 1500 TABLET, EXTENDED RELEASE ORAL at 09:30

## 2021-09-08 RX ADMIN — IOPAMIDOL 80 ML: 755 INJECTION, SOLUTION INTRAVENOUS at 02:18

## 2021-09-08 RX ADMIN — Medication 5 ML: at 20:14

## 2021-09-08 RX ADMIN — LACTULOSE 20 G: 20 SOLUTION ORAL at 20:14

## 2021-09-08 RX ADMIN — LACTULOSE 20 G: 20 POWDER, FOR SOLUTION ORAL at 09:30

## 2021-09-08 RX ADMIN — DEXTROSE MONOHYDRATE: 50 INJECTION, SOLUTION INTRAVENOUS at 12:01

## 2021-09-08 RX ADMIN — LACTULOSE 20 G: 20 POWDER, FOR SOLUTION ORAL at 15:45

## 2021-09-08 ASSESSMENT — PAIN SCALES - GENERAL: PAINLEVEL_OUTOF10: 0

## 2021-09-08 NOTE — PROGRESS NOTES
Progress Note    Admit Date: 9/7/2021  Diet: No diet orders on file    CC: AMS    Interval history:   - responding in 2-3 word sentences and following commands this AM  - did not get first dose lactulose overnight: receiving this AM    HPI:  Pt is a 46 y/o M w/ a PMHx of IVDU, Cirrhosis, and Hep B who presents with AMS. Per ED report, patient arrived in a state of altered mental status but was conversational and following commands. Pt was reportedly confused, and repeatedly answered questions with the phrase \"about 2 weeks ago\" even when inappropriate. Pt also reportedly was able to follow commands enough to have demonstrated asterixis. Pt was recently discharged from this hospital on 8/02 after having been treated for his decompensated liver cirrhosis and a PNA c/b a large R-sided pleural effusion. Pt seen at beside in ED but at this time was minimally responsive. Pt was noted to groan with abdominal palpation. CT Chest/Abd/Pelvis was ordered to r/o an acute intra-abdominal process. CTH was WNL, and a CXR was improved from his previous films. Admission ammonia was elevated to 141 and lactate was also elevated to 2.4. Transaminases demonstrate an alcoholic injury pattern, with a AST of 74 and and ALT of 29. Indirect Bilirubin was 5.8, compared to a direct bilirubin of 2.2. Albumin was decreased to 2.7 while INR was elevated to 1.61. Pt admitted for ongoing work-up of his AMS and mgmt of his chronic liver issues.      Medications:     Scheduled Meds:   [Held by provider] rifaximin  200 mg Oral TID    potassium chloride  20 mEq Oral Once    lactulose  20 g Oral Once     Continuous Infusions:  PRN Meds:    Objective:   Vitals:   T-max:  Patient Vitals for the past 8 hrs:   BP Temp Temp src Pulse Resp SpO2   09/08/21 0902 126/69 98.3 °F (36.8 °C) Axillary 79 16 96 %   09/08/21 0315 134/69 98.4 °F (36.9 °C) Oral 83 12 94 %   09/08/21 0200 (!) 114/54   81 9 92 %   09/08/21 0130 131/70          No intake or output data in the 24 hours ending 09/08/21 0913    Review of Systems   Unable to perform ROS: Mental status change       Physical Exam  HENT:      Mouth/Throat:      Mouth: Mucous membranes are moist.      Pharynx: Oropharynx is clear. Eyes:      Extraocular Movements: Extraocular movements intact. Pupils: Pupils are equal, round, and reactive to light. Cardiovascular:      Rate and Rhythm: Normal rate and regular rhythm. Pulses: Normal pulses. Heart sounds: Murmur (systolic murmur at LUSB) heard. Pulmonary:      Effort: Pulmonary effort is normal.      Breath sounds: Wheezing (Diffuse. Worse R>L) and rales (Worse on R) present. Comments: Decreased breath sounds R>L  Abdominal:      General: Bowel sounds are normal. There is distension. Palpations: Abdomen is soft. Tenderness: There is no abdominal tenderness. Skin:     Comments: Significant tree-bark/woody appearance of bilateral lower extremities with raised brown dried areas   Neurological:      General: No focal deficit present. Mental Status: He is alert. He is disoriented. Comments: Follows commands but only answers in 2-3 words. Asterixis present.  Not oriented to time         LABS:    CBC:   Recent Labs     09/07/21 2225 09/08/21 0755   WBC 11.8* 9.6   HGB 11.7* 10.3*   HCT 34.6* 30.5*   * see below   .7* 102.3*     Renal:    Recent Labs     09/07/21 2225 09/08/21 0008 09/08/21  0627 09/08/21  0755   *  --  136 134*   K 8.0* 3.9  --  3.3*   CL 89*  --   --  96*   CO2 29  --   --  30   BUN 30*  --   --  28*   CREATININE 0.7*  --   --  0.8*   GLUCOSE 79  --   --  75   CALCIUM 8.3  --   --  8.3   MG  --   --   --  2.00   PHOS  --   --   --  3.4   ANIONGAP 8  --   --  8     Hepatic:   Recent Labs     09/07/21 2225 09/08/21 0008 09/08/21  0755   * 74*  --    ALT 46* 29  --    BILITOT 8.0* 7.6*  --    BILIDIR 2.2* 2.8*  --    PROT 7.3 6.3*  --    LABALBU 2.7* 2.4* 2.2* ALKPHOS 92 106  --      Troponin:   Recent Labs     09/07/21  2225 09/08/21  0008   TROPONINI 0.10* 0.09*     BNP: No results for input(s): BNP in the last 72 hours. Lipids: No results for input(s): CHOL, HDL in the last 72 hours. Invalid input(s): LDLCALCU, TRIGLYCERIDE  ABGs:  No results for input(s): PHART, DGF4MOG, PO2ART, GJD0UCO, BEART, THGBART, P1EFARCL, DBL2EUU in the last 72 hours. INR:   Recent Labs     09/07/21  2312   INR 1.61*     Lactate: No results for input(s): LACTATE in the last 72 hours. Cultures:  -----------------------------------------------------------------  RAD:   CT CHEST ABDOMEN PELVIS W CONTRAST   Final Result     Shrunken and nodular liver with esophageal varices and splenomegaly    consistent with cirrhosis. There is mild diffuse anasarca over the torso    and a small amount of ascites. No focal abscess is identified. _______________________________________________       IMPRESSION:        1. There is a partially loculated moderate to large right pleural    effusion, slightly increased compared to previous measuring up to 7.7 cm    thick anteriorly. 2.  Multiple subacute appearing right anterior rib fractures,    incompletely healed. 3.  Scattered rounded groundglass infiltrates on the left, improved since    previous. XR CHEST PORTABLE   Final Result      1. Persistent moderate right-sided pleural effusion but diminished consolidation with still central and lower lobe and pleural-based consolidations noted but improved compared to prior studies   2. Decreased left patchy airspace disease, greatly improved               CT Head WO Contrast   Final Result      Normal noncontrast CT the brain without acute hemorrhage, edema or hydrocephalus.        Bilateral sinus disease predominantly involving the maxillary sinuses and ethmoid air cells          Assessment/Plan:   44 y/o M w/ a PMHx of IVDU, Cirrhosis, and Hep B p/w AMS    Decompensated cirrhosis, Hepatic encephalopathy: Ammonia elevated. Need to rule out cause of exacerbation such as GIB or infection especially in context of known varices and ascites although WBC normalized and Hb stable  - lactulose TID: titrate to 3-4 stools/day  - lactulose enema today  - consider rifaximin if does not improve  - GI consult  - FU blood cxs  - UA  - FOBT  - SLP  - daily LFTs  - palliative consult    Ascites 2/2 cirrhosis: abd exam relatively benign today  - cont ceftriaxone  - diagnostic paracentesis  - hold off on diuresis until Na corrects  - hold torsemide and spironolactone while NPO    Esophageal varices: no signs of acute bleeding  - hold AC for now    Hyponatremia: corrected fairly quickly from 126 to 134 without intervention. 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 Given hepatic hydrothorax want to r/o infection  - pulm c/s  - diuresis per above    Macrocytic anemia: stable to priors  - folate, B12      Code Status: full code  FEN: NPO pending SLP eval  PPX: SCDs  DISPO: IP    Beatriz Alexander MD, PGY-1  09/08/21  9:13 AM    This patient has been staffed and discussed with Dr. Stephanie Xiao.

## 2021-09-08 NOTE — ED PROVIDER NOTES
[address] [facility] ENCOUNTER          PHYSICIAN ASSISTANT NOTE       Date of evaluation: 9/7/2021    Chief Complaint     Other (pt states, \"Im wheezy. \" )      History of Present Illness     Yesenia Griffin is a 45 y.o. male who presents to the emergency room with altered mental status. Patient initially presents the emergency department complaining of feeling wheezy. When I assessed the patient, he states he came because he is not feeling well. He is unable to give me any information was making him feel unwell. I asked the patient if he has used any alcohol or drugs, he states 2 weeks ago. When I asked him if he has had any nausea or vomiting he states 2 weeks ago. He is unable to provide any meaningful history at this time. He believes it is November in the 1980s. Patient does have scleral icterus to bilateral eyes and has recently been admitted to this hospital for pleural effusions, cirrhosis of the liver, hepatitis C. Review of Systems     Review of Systems   Unable to perform ROS: Mental status change       Past Medical, Surgical, Family, and Social History     He has no past medical history on file. He has no past surgical history on file. His family history is not on file. He reports that he has been smoking cigarettes. He started smoking about 41 years ago. He has a 39.00 pack-year smoking history. He has never used smokeless tobacco. He reports current drug use. Drug: IV. He reports that he does not drink alcohol. Medications     Previous Medications    ALBUTEROL SULFATE  (90 BASE) MCG/ACT INHALER    Inhale 2 puffs into the lungs 4 times daily    SKIN PROTECTANTS, MISC. (HYDROCERIN) CREA CREAM    Apply topically 2 times daily    SPIRONOLACTONE (ALDACTONE) 100 MG TABLET    Take 1 tablet by mouth daily    TORSEMIDE (DEMADEX) 100 MG TABLET    Take 0.5 tablets by mouth daily       Allergies     He has No Known Allergies.     Physical Exam     INITIAL VITALS: BP: (!) 142/87, Temp: 98.2 °F (36.8 °C), Pulse: 67, Resp: 16, SpO2: 96 %  Physical Exam  Constitutional:       Appearance: Normal appearance. HENT:      Head: Normocephalic and atraumatic. Nose: Nose normal.      Mouth/Throat:      Mouth: Mucous membranes are moist.   Eyes:      General: Scleral icterus present. Extraocular Movements: Extraocular movements intact. Pupils: Pupils are equal, round, and reactive to light. Cardiovascular:      Pulses: Normal pulses. Heart sounds: Normal heart sounds. Pulmonary:      Effort: Pulmonary effort is normal. No respiratory distress. Breath sounds: Normal breath sounds. Abdominal:      General: There is no distension. Palpations: Abdomen is soft. Tenderness: There is no abdominal tenderness. Musculoskeletal:         General: Normal range of motion. Cervical back: Normal range of motion and neck supple. Skin:     General: Skin is warm and dry. Neurological:      General: No focal deficit present. Mental Status: He is alert. He is disoriented. Cranial Nerves: No cranial nerve deficit. Motor: No weakness. Diagnostic Results     EKG   Interpreted in conjunction with emergency department physician Dada Alvarez MD  Rhythm: normal sinus   Rate: normal  Axis: normal  : none  Conduction: normal  ST Segments: no acute change  T Waves: no acute change  Q Waves:nonspecific  Clinical Impression: no acute changes  Comparison:  7/25/2021    RADIOLOGY:  XR CHEST PORTABLE   Final Result      1. Persistent moderate right-sided pleural effusion but diminished consolidation with still central and lower lobe and pleural-based consolidations noted but improved compared to prior studies   2. Decreased left patchy airspace disease, greatly improved               CT Head WO Contrast   Final Result      Normal noncontrast CT the brain without acute hemorrhage, edema or hydrocephalus.        Bilateral sinus disease predominantly involving the maxillary sinuses and ethmoid air cells          LABS:   Please see medical record     RECENT VITALS:  BP: (!) 142/87, Temp: 98.2 °F (36.8 °C), Pulse: 67, Resp: 16, SpO2: 96 %     ED Course     Nursing Notes, Past Medical Hx,Past Surgical Hx, Social Hx, Allergies, and Family Hx were reviewed. The patient was given the following medications:  Orders Placed This Encounter   Medications    lactulose (CEPHULAC) packet 20 g       CONSULTS:  Lesley Childers / BOBBI / Murphy Familia is a 45 y.o. male who presents the emergency room with altered mental status. Vital signs stable on presentation remained stable throughout his stay. Thorough history and physical exam performed in detail above. Patient presents the emergency department with altered mental status. His initial complaint when shaking and was wheezing. When I assessed the patient, he states he has not been feeling well. Patient is unable to describe what is making him not feel well. He does believe it is November in the 1980s. On physical exam he does have scleral icterus bilaterally. Heart rate and rhythm regular. Lungs clear to auscultation bilaterally. Abdomen soft and nontender. Patient did have asterixis noted on my exam.  He is able to participate in neuro exam and has 5/5 strength in bilateral upper extremities. Cranial nerves II through XII intact. No pronator drift. EKG without ST or T wave change concerning for ischemia. CBC with a mildly elevated white blood cell count to 11.8. BMP with a hemolyzed potassium of 8.0, however his EKG shows no concerning changes for hyperkalemia. Repeat potassium pending at this time. Hepatic function panel did show an AST of 187 and a total bilirubin of 8.0, which is increased from 48 and 6.0 on 8/5/2021. Lipase unremarkable. Initial troponin elevated to 0.10, however repeat 1 hour troponin downtrending to 0.09.   Ethanol level negative. Ammonia level elevated to 141. Chest x-ray did show persistent moderate right-sided pleural effusion, but diminished consolidation with still central and lower lobe consolidations but improved from previous studies. Head CT unremarkable. Given hemolysis, repeat ammonia, potassium, and hepatic function panel was ordered. I do believe that hyperammonemia is likely causing the patient's altered mental status given history and physical exam findings. Orders were placed for 20 g of lactulose orally. At this time, I do believe the patient should be admitted to the hospital for further evaluation. Plan was discussed with the patient was agreeable. Call was placed to hospitalist to discuss admission. This patient was also evaluated by the attending physician. All care plans were discussed and agreed upon. Clinical Impression     1. Altered mental status, unspecified altered mental status type    2. Hyperammonemia (HCC)        Disposition     PATIENT REFERRED TO:  No follow-up provider specified.     DISCHARGE MEDICATIONS:  New Prescriptions    No medications on file       DISPOSITION Decision To Admit 09/07/2021 11:58:24 PM        Brittany Patten PA-C  09/08/21 [id number]

## 2021-09-08 NOTE — DISCHARGE SUMMARY
INTERNAL MEDICINE DEPARTMENT AT [address] [place] [address]  DISCHARGE SUMMARY    Patient ID: Brayan Reece                                             Discharge Date: 9/10/2021   Patient's PCP: La Gates, APRN - CNP                                          Discharge Physician: Hitesh Valdivia MD MD  Admit Date: 9/7/2021   Admitting Physician: Franci Ho DO    PROBLEMS DURING HOSPITALIZATION:  Present on Admission:   Encephalopathy acute   Hyponatremia   Altered mental status   Hyperammonemia ([facility] [address]   Anasarca      DISCHARGE DIAGNOSES:    HPI:  Pt is a 44 y/o M w/ a PMHx of IVDU, Cirrhosis, and Hep B who presents with AMS. Per ED report, patient arrived in a state of altered mental status but was conversational and following commands. Pt was reportedly confused, and repeatedly answered questions with the phrase \"about 2 weeks ago\" even when inappropriate. Pt also reportedly was able to follow commands enough to have demonstrated asterixis. Pt was recently discharged from this hospital on 8/02 after having been treated for his decompensated liver cirrhosis and a PNA c/b a large R-sided pleural effusion.               Pt seen at beside in ED but at this time was minimally responsive. Pt was noted to groan with abdominal palpation. CT Chest/Abd/Pelvis was ordered to r/o an acute intra-abdominal process. CTH was WNL, and a CXR was improved from his previous films. Admission ammonia was elevated to 141 and lactate was also elevated to 2.4. Transaminases demonstrate an alcoholic injury pattern, with a AST of 74 and and ALT of 29. Indirect Bilirubin was 5.8, compared to a direct bilirubin of 2.2. Albumin was decreased to 2.7 while INR was elevated to 1.61. Pt admitted for ongoing work-up of his AMS and mgmt of his chronic liver issues. Pt started on lactulose and given lactulose enema. GIB and infectious workup negative. Hyponatremia overcorrected from 126 to 134 without intervention and D5 given for 6 hrs.  Na remained stable. D5 was discontinued. Mental status improved with lactulose and ammonia down trended appropriately. Patient's mental status improved with lactulose and ammonia down trended appropriately. Pleural effusion expected 2/2 chronic hepatic hydrothorax and thoracentesis consistent with this. Paracentesis negative for infection. Ceftriaxone was discontinued and patient was discharged to home with lactulose, home diuretics, and OP FU with GI. The following issues were addressed during hospitalization:  Hepatic encephalopathy secondary to decompensated cirrhosis   ascites secondary to cirrhosis   esophageal varices  Hyponatremia secondary to cirrhosis  Chronic loculated pleural effusion secondary to hepatic hydrothorax  Macrocytic anemia    Physical Exam:  BP (!) 120/58   Pulse 81   Temp 97.6 °F (36.4 °C) (Oral)   Resp 14   SpO2 91%     Physical Exam  HENT:      Mouth/Throat:      Mouth: Mucous membranes are moist.      Pharynx: Oropharynx is clear. Eyes:      Extraocular Movements: Extraocular movements intact. Pupils: Pupils are equal, round, and reactive to light. Cardiovascular:      Rate and Rhythm: Normal rate and regular rhythm. Pulses: Normal pulses. Heart sounds: Normal heart sounds. Pulmonary:      Effort: Pulmonary effort is normal. No respiratory distress. Breath sounds: Wheezing (diffuse worse R>L ) and rales (worse R>L) present. Comments: Decreased breath sounds in RLL  Abdominal:      General: Bowel sounds are normal. There is distension. Palpations: Abdomen is soft. Tenderness: There is no abdominal tenderness. Skin:     Comments: Significant woody appearance of bilateral lower extremities   Neurological:      General: No focal deficit present. Mental Status: He is alert and oriented to person, place, and time.            Consults: pulmonary/intensive care, GI and nephrology  Significant Diagnostic Studies:  CT chest abdomen pelvis w con, CXR, CT head wo, thoracentesis, paracentesis  Treatments: lactulose, ceftriaxone, lasix, spironolactone  Disposition: home  Discharged Condition: Stable  Follow Up: Primary Care Physician in one week, GI in 1 week    DISCHARGE MEDICATION:     Medication List      START taking these medications    lactulose 10 GM/15ML solution  Commonly known as: CHRONULAC  Take 30 mLs by mouth 3 times daily        CONTINUE taking these medications    albuterol sulfate  (90 Base) MCG/ACT inhaler  Inhale 2 puffs into the lungs 4 times daily     Hydrocerin Crea cream  Apply topically 2 times daily     spironolactone 100 MG tablet  Commonly known as: ALDACTONE  Take 1 tablet by mouth daily     torsemide 100 MG tablet  Commonly known as: DEMADEX  Take 0.5 tablets by mouth daily           Where to Get Your Medications      You can get these medications from any pharmacy    Bring a paper prescription for each of these medications  · lactulose 10 GM/15ML solution  · spironolactone 100 MG tablet  · torsemide 100 MG tablet       Activity: activity as tolerated  Diet: regular diet  Wound Care: none needed    Time Spent on discharge is more than 30 minutes    Signed:  Sachin De La Vega MD,  MD, PGY-1  9/10/2021

## 2021-09-08 NOTE — CONSULTS
Initial Pulmonary & Critical Care Consult Note      Reason for Consult: Dr. Dena Barrientos  Requesting Physician: Worsening Chronic Loculated Pleural Effusion  Subjective:   CHIEF COMPLAINT / HPI:                65-year-old male with a past medical history of IV drug use most commonly fentanyl,  decompensated cirrhosis secondary to hep C presented with altered mental status. As per chart review the patient was minimally responsive and responding to pain however he would periodically follow commands.  In the ED his vitals were stable and he was satting on room air. [facility] was admitted for hepatic encephalopathy and is being treated with lactulose and rifaximin.  His CT chest abdomen pelvis with contrast showed a partially loculated moderate to large right pleural effusion which is slightly increased from his previous imaging.  It also shows multiple subacute appearing right anterior rib fractures which are incompletely healed and scattered rounded groundglass infiltrates on the left which are improved since last image. He was previously seen by pulmonology and had a thoracentesis, his fluid studies were consistent with transudative which was likely secondary to hepatic hydrothorax. The airspace opacities on the left are from previous strep pneumo bacteremia  which have improved    Past Medical History:  History reviewed. No pertinent past medical history. Past Surgical History:    History reviewed. No pertinent surgical history.   Current Medications:     [Held by provider] rifaximin  200 mg Oral TID    lactulose  20 g Oral TID       REVIEW OF SYSTEMS:    Unable to assess since patient not very verbally repsponsive    Objective:   PHYSICAL EXAM:      VITALS:  /69   Pulse 79   Temp 98.3 °F (36.8 °C) (Axillary)   Resp 16   SpO2 96%   24HR INTAKE/OUTPUT:  No intake or output data in the 24 hours ending 21 1000  CURRENT PULSE OXIMETRY:  SpO2: 96 %  24HR PULSE OXIMETRY RANGE:  SpO2  Av %  Min: 92 %  Max: 98 %    CONSTITUTIONAL:  awake, alert, not entirely cooperative, no apparent distress but will follow commands like stretch your arms out and he was able to read my name off my badge  EYES: Pupils equal round and reactive to light. NECK:  trachea midlinel  LUNGS:  no increased work of breathing. No accessory muscle use. Dec breath sounds on right side  CARDIOVASCULAR:  normal S1 and S2, no edema and no JVD  ABDOMEN:  distended , and no tenderness to palpation  SKIN:  Appears jaundiced    DATA:    Old records have been reviewed  CBC with Differential:    Lab Results   Component Value Date    WBC 9.6 09/08/2021    RBC 2.98 09/08/2021    HGB 10.3 09/08/2021    HCT 30.5 09/08/2021    PLT see below 09/08/2021    .3 09/08/2021    MCH 34.6 09/08/2021    MCHC 33.8 09/08/2021    RDW 16.3 09/08/2021    BANDSPCT 9 07/25/2021    LYMPHOPCT 25.0 09/07/2021    MONOPCT 10.5 09/07/2021    BASOPCT 0.5 09/07/2021    MONOSABS 1.2 09/07/2021    LYMPHSABS 3.0 09/07/2021    EOSABS 0.2 09/07/2021    BASOSABS 0.1 09/07/2021     BMP:    Lab Results   Component Value Date     09/08/2021    K 3.3 09/08/2021    K 8.0 09/07/2021    CL 96 09/08/2021    CO2 30 09/08/2021    BUN 28 09/08/2021    CREATININE 0.8 09/08/2021    CALCIUM 8.3 09/08/2021    GFRAA >60 09/08/2021    LABGLOM >60 09/08/2021    GLUCOSE 75 09/08/2021     Hepatic Function Panel:    Lab Results   Component Value Date    ALKPHOS 106 09/08/2021    ALT 29 09/08/2021    AST 74 09/08/2021    PROT 6.3 09/08/2021    BILITOT 7.6 09/08/2021    BILIDIR 2.8 09/08/2021    IBILI 4.8 09/08/2021     ABG:    Lab Results   Component Value Date    AWA6BQE 31.9 07/31/2021    BEART 7 07/31/2021    G7WGTWPT 90 07/31/2021    PHART 7.375 07/31/2021    GMQ5LNK 54.6 07/31/2021    PO2ART 61.2 07/31/2021    LRP7RXA 34 07/31/2021       Radiology Review:  All pertinent images / reports were reviewed as a part of this visit.   CT chest/abd/pelvis w con 9/8/2021  IMPRESSION:        1. Santo Burch is a

## 2021-09-08 NOTE — RT PROTOCOL NOTE
RT Nebulizer Bronchodilator Protocol Note    There is a bronchodilator order in the chart from a provider indicating to follow the RT Bronchodilator Protocol and there is an Initiate RT Bronchodilator Protocol order as well (see protocol at bottom of note). The findings from the last RT Protocol Assessment were as follows:  Smoking: >15 Pack years  Surgical Status: No surgery  Xray: Chronic changes  Respiratory Pattern: RR 12-20  Mental Status: Confused but follows commands  Breath Sounds: Scattered wheeze  Cough: Strong, spontaneous, non-productive  Activity Level: Walking unassisted  Oxygen Requirement: Room Air - 2LNC/28% or home setting  Indication for Bronchodilator Therapy: On home bronchodilators  Bronchodilator Assessment Score: 5    Aerosolized bronchodilator medication orders have not been revised according to the RT Bronchodilator Protocol, home regimen. RT Bronchodilator Protocol:    Respiratory Therapist to perform RT Therapy Protocol Assessment then follow the protocol. No Indications  adjust the frequency to every 6 hours PRN wheezing or bronchospasm, if no treatments needed after 48 hours then discontinue using Per Protocol order mode. If indication present, adjust the RT bronchodilator orders based on the Bronchodilator Assessment Score as follows:    0-6  enter or revise RT Bronchodilator order to Albuterol Nebulizer order with frequency of every 2 hours PRN for wheezing or increased work of breathing using Per Protocol order mode. Repeat RT Therapy Protocol Assessment as needed. 7-10 - discontinue any other Inpatient aerosolized bronchodilator medication orders and enter or revise two Albuterol Nebulizer orders, one with BID frequency and one with frequency of every 2 hours PRN wheezing or increased work of breathing using Per Protocol order mode. Repeat RT Therapy Protocol Assessment with second treatment then BID and as needed.       11-13 - discontinue any other Inpatient aerosolized bronchodilator medication orders and enter [employer] order with QID frequency and an Albuterol Nebulizer order with frequency of every 2 hours PRN wheezing or increased work of breathing using Per Protocol order mode. Repeat RT Therapy Protocol Assessment with second treatment then QID and as needed. Greater than 13 - discontinue any other Inpatient aerosolized bronchodilator medication orders and enter a DuoNeb Nebulizer order with every 4 hours frequency and an Albuterol Nebulizer order with frequency of every 2 hours PRN wheezing or increased work of breathing using Per Protocol order mode. Repeat RT Therapy Protocol Assessment with second treatment then every 4 hours and as needed. RT to enter RT Home Evaluation for COPD & MDI Assessment order using Per Protocol order mode.     Electronically signed by Leeanna Pascual RCP on 9/8/2021 at 6:49 PM

## 2021-09-08 NOTE — H&P
Internal Medicine  PGY 1  History & Physical      CC: AMS    History Obtained From:  electronic medical record & ED PA    HISTORY OF PRESENT ILLNESS:   Pt is a 44 y/o M w/ a PMHx of IVDU, Cirrhosis, and Hep B who presents with AMS. Per ED report, patient arrived in a state of altered mental status but was conversational and following commands. Pt was reportedly confused, and repeatedly answered questions with the phrase \"about 2 weeks ago\" even when inappropriate. Pt also reportedly was able to follow commands enough to have demonstrated asterixis. Pt was recently discharged from this hospital on 8/02 after having been treated for his decompensated liver cirrhosis and a PNA c/b a large R-sided pleural effusion. Pt seen at beside in ED but at this time was minimally responsive. Pt was noted to groan with abdominal palpation. CT Chest/Abd/Pelvis was ordered to r/o an acute intra-abdominal process. CTH was WNL, and a CXR was improved from his previous films. Admission ammonia was elevated to 141 and lactate was also elevated to 2.4. Transaminases demonstrate an alcoholic injury pattern, with a AST of 74 and and ALT of 29. Indirect Bilirubin was 5.8, compared to a direct bilirubin of 2.2. Albumin was decreased to 2.7 while INR was elevated to 1.61. Pt admitted for ongoing work-up of his AMS and mgmt of his chronic liver issues. Past Medical History:    -Hepatitis B  -Cirrhosis of liver with ascites  -Chronic venous stasis   -Pleural Effusion    Past Surgical History:    History reviewed. No pertinent surgical history. Medications Priorto Admission:    Not in a hospital admission. Allergies:  Patient has no known allergies. Social History:   TOBACCO:   reports that he has been smoking cigarettes. He started smoking about 41 years ago. He has a 39.00 pack-year smoking history. He has never used smokeless tobacco.  ETOH:   reports no history of alcohol use.   DRUGS : Per chart review, pt reports previous IV drug use    Family History:   History reviewed. No pertinent family history. Review of Systems   Unable to perform ROS: Mental status change     Physical Exam  Constitutional:       General: He is not in acute distress. Appearance: He is ill-appearing. Comments: [facility]      Head: Normocephalic and atraumatic. Right Ear: External ear normal.      Left Ear: External ear normal.      Nose: Nose normal.   Eyes:      General: Scleral icterus present. Cardiovascular:      Rate and Rhythm: Normal rate and regular rhythm. Heart sounds: Normal heart sounds. No murmur heard. No friction rub. No gallop. Pulmonary:      Effort: No respiratory distress. Breath sounds: Normal breath sounds. No wheezing, rhonchi or rales. Comments: Low RR, ~6    Abdominal:      General: There is distension (Ascites present). Palpations: Abdomen is soft. There is no mass. Tenderness: There is abdominal tenderness (pt graons to palpation to the abdomen). There is no guarding. Musculoskeletal:      Right lower leg: Edema present. Left lower leg: Edema present. Comments: Bilateral chronic venous statis changes to lower legs   Skin:     Coloration: Skin is jaundiced. Findings: Erythema (lower legs bilaterally ) present. Neurological:      Mental Status: He is disoriented. Comments: Patient tracks once aroused but is otherwise nonverbal and does not follow commands.           Vitals:    09/08/21 0030   BP: (!) 113/55   Pulse: 82   Resp: 18   Temp:    SpO2: 98%       DATA:    Labs:  CBC:   Recent Labs     09/07/21 2225   WBC 11.8*   HGB 11.7*   HCT 34.6*   *       BMP:   Recent Labs     09/07/21 2225 09/08/21 0008   *  --    K 8.0* 3.9   CL 89*  --    CO2 29  --    BUN 30*  --    CREATININE 0.7*  --    GLUCOSE 79  --      LFT's:   Recent Labs     09/07/21 2225 09/08/21 0008   * 74*   ALT 46* 29   BILITOT 8.0* 7.6*   ALKPHOS 92 106     Troponin:   Recent Labs     09/07/21  2225 09/08/21  0008   TROPONINI 0.10* 0.09*     BNP:No results for input(s): BNP in the last 72 hours. ABGs: No results for input(s): PHART, ZPV8MXQ, PO2ART in the last 72 hours. INR:   Recent Labs     09/07/21  2312   INR 1.61*       U/A:No results for input(s): NITRITE, COLORU, PHUR, LABCAST, WBCUA, RBCUA, MUCUS, TRICHOMONAS, YEAST, BACTERIA, CLARITYU, SPECGRAV, LEUKOCYTESUR, UROBILINOGEN, BILIRUBINUR, BLOODU, GLUCOSEU, AMORPHOUS in the last 72 hours. Invalid input(s): KETONESU    XR CHEST PORTABLE   Final Result      1. Persistent moderate right-sided pleural effusion but diminished consolidation with still central and lower lobe and pleural-based consolidations noted but improved compared to prior studies   2. Decreased left patchy airspace disease, greatly improved               CT Head WO Contrast   Final Result      Normal noncontrast CT the brain without acute hemorrhage, edema or hydrocephalus. Bilateral sinus disease predominantly involving the maxillary sinuses and ethmoid air cells      CT CHEST ABDOMEN PELVIS W CONTRAST    (Results Pending)         ASSESSMENT AND PLAN:    Pt is a 46 y/o M w/ a PMHx of IVDU, Cirrhosis, and Hep B who presents w/ hepatic encephalopathy. Decompensated Cirrhosis  Hepatic Encephalopathy  Pt w/ AMS in ED, ammonia of 141 and reported asterixis. Known hx of cirrhosis.    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 Adelaide Galo MD  9/8/2021,  1:08 AM      I saw the patient independently from the resident . I discussed the care with the resident. I personally reviewed the HPI, PH, FH, SH, ROS and medications. I repeated pertinent portions of the examination and reviewed the relevant imaging and laboratory data. I agree with the findings, assessment and plan as documented.  addition to: Patient is admitted for acute decompensated cirrhosis hepatic encephalopathy treatment as above

## 2021-09-08 NOTE — PROGRESS NOTES
Pt given lactulose enema, pt was able to tolerate entire enema. Only liquid was returned very minimal stool noted. Po lactulose given as well with encouragement.

## 2021-09-08 NOTE — ED TRIAGE NOTES
Patient comes in with AMS and complains of SOB, patient has a signifiant medical history, patient thinks I is the 1980's

## 2021-09-08 NOTE — PROGRESS NOTES
Pt's step-brother Ryan called, stated he brought the pt to the hospital because he was concerned about his condition. Update given. His number was added to emergency contacts.

## 2021-09-08 NOTE — CONSULTS
Nephrology Consult Note                                                                                                                                                                                                                                                                                                                                                               Office : [phone]     Fax :[phone]              Patient's Name: Jason Gordillo  9/8/2021    Reason for Consult:  Hyponatremia   Requesting Physician:  DEVEN Simmons CNP      Chief Complaint:  SOB     History of Present Ilness:      56-year-old male with a past medical history of IV drug use most commonly fentanyl,  decompensated cirrhosis secondary to hep C presented with altered mental status. As per chart review the patient was minimally responsive and responding to pain however he would periodically follow commands.  In the ED his vitals were stable and he was satting on room air. Wayne French was admitted for hepatic encephalopathy and is being treated with lactulose and rifaximin.  His CT chest abdomen pelvis with contrast showed a partially loculated moderate to large right pleural effusion which is slightly increased from his previous imaging      History reviewed. No pertinent surgical history. History reviewed. No pertinent family history. reports that he has been smoking cigarettes. He started smoking about 41 years ago. He has a 39.00 pack-year smoking history. He has never used smokeless tobacco. He reports current drug use. Drug: IV. He reports that he does not drink alcohol. Allergies:  Patient has no known allergies.     Current Medications:    sodium chloride flush 0.9 % injection 5-40 mL, 2 times per day  sodium chloride flush 0.9 % injection 5-40 mL, PRN  0.9 % sodium chloride infusion, PRN  ondansetron (ZOFRAN-ODT) disintegrating tablet 4 mg, Q8H PRN   Or  ondansetron (ZOFRAN) injection 4 mg, Q6H PRN  polyethylene glycol (GLYCOLAX) packet 17 g, Daily PRN  acetaminophen (TYLENOL) tablet 650 mg, Q6H PRN   Or  acetaminophen (TYLENOL) suppository 650 mg, Q6H PRN  [START ON 9/9/2021] cefTRIAXone (ROCEPHIN) 1000 mg IVPB in 50 mL D5W minibag, Q24H  lactulose (CHRONULAC) 10 GM/15ML solution 20 g, TID  [Held by provider] rifaximin (XIFAXAN) tablet 200 mg, TID  albuterol (PROVENTIL) nebulizer solution 2.5 mg, 4x daily        Review of Systems:   14 point ROS obtained but were negative except mentioned in HPI      Physical exam:     Vitals:  BP (!) 146/82   Pulse 85   Temp 97.6 °F (36.4 °C) (Axillary)   Resp 16   SpO2 95%   Constitutional:  AA  Skin: no rash, turgor wnl  Heent:  eomi, mmm  Neck: no bruits or jvd noted  Cardiovascular:  S1, S2 without m/r/g  Respiratory: dec BS   Abdomen:  +bs, soft, nt, nd  Ext: +++ lower extremity edema  Psychiatric: mood and affect appropriate  Musculoskeletal:  Rom, muscular strength intact    Data:   Labs:  CBC: Recent Labs     09/07/21 2225 09/08/21 0755 09/08/21  1146   WBC 11.8* 9.6  --    HGB 11.7* 10.3*  --    * see below 73*     BMP:    Recent Labs     09/07/21 2225 09/08/21  0008 09/08/21  0627 09/08/21  0755 09/08/21  1146 09/08/21  1848   *  --    < > 134* 135* 133*   K 8.0* 3.9  --  3.3*  --   --    CL 89*  --   --  96*  --   --    CO2 29  --   --  30  --   --    BUN 30*  --   --  28*  --   --    CREATININE 0.7*  --   --  0.8*  --   --    GLUCOSE 79  --   --  75  --   --     < > = values in this interval not displayed. Ca/Mg/Phos:   Recent Labs     09/07/21 2225 09/08/21  0755   CALCIUM 8.3 8.3   MG  --  2.00   PHOS  --  3.4     Hepatic:   Recent Labs     09/07/21 2225 09/08/21 0008   * 74*   ALT 46* 29   BILITOT 8.0* 7.6*   ALKPHOS 92 106     Troponin:   Recent Labs     09/07/21 2225 09/08/21 0008   TROPONINI 0.10* 0.09*     BNP: No results for input(s): BNP in the last 72 hours.   Lipids: No results for input(s): CHOL, TRIG, HDL, LDLCALC, LABVLDL in the last 72 hours. ABGs: No results for input(s): PHART, PO2ART, BFL6GKO in the last 72 hours. INR:   Recent Labs     09/07/21  2312   INR 1.61*     UA:  Recent Labs     09/08/21  1745   COLORU Yellow   CLARITYU Clear   GLUCOSEU Negative   BILIRUBINUR MODERATE*   KETUA Negative   SPECGRAV 1.015   BLOODU LARGE*   PHUR 5.0   PROTEINU Negative   UROBILINOGEN 4.0*   NITRU Negative   LEUKOCYTESUR Negative   LABMICR YES   URINETYPE NotGiven      Urine Microscopic:   Recent Labs     09/08/21  1745   BACTERIA Rare*   WBCUA 0-2   RBCUA 3-4   EPIU 2-5     Urine Culture: No results for input(s): LABURIN in the last 72 hours. Urine Chemistry:   Recent Labs     09/08/21  1745   NAUR <20             IMAGING:  CT CHEST ABDOMEN PELVIS W CONTRAST   Final Result     Shrunken and nodular liver with esophageal varices and splenomegaly    consistent with cirrhosis. There is mild diffuse anasarca over the torso    and a small amount of ascites. No focal abscess is identified. _______________________________________________       IMPRESSION:        1. There is a partially loculated moderate to large right pleural    effusion, slightly increased compared to previous measuring up to 7.7 cm    thick anteriorly. 2.  Multiple subacute appearing right anterior rib fractures,    incompletely healed. 3.  Scattered rounded groundglass infiltrates on the left, improved since    previous. XR CHEST PORTABLE   Final Result      1. Persistent moderate right-sided pleural effusion but diminished consolidation with still central and lower lobe and pleural-based consolidations noted but improved compared to prior studies   2. Decreased left patchy airspace disease, greatly improved               CT Head WO Contrast   Final Result      Normal noncontrast CT the brain without acute hemorrhage, edema or hydrocephalus.        Bilateral sinus disease predominantly involving the maxillary sinuses and ethmoid air cells      US GUIDED PARACENTESIS    (Results Pending)       Assessment/Plan   1. Hyponatremia     2. HTN    3. Anemia    4. Acid- base/ Electrolyte imbalance     5. Cirrhosis     6. Pleural effusion     7.  Anasarca     Plan   - Hyponatremia is overcorrected   - D5 x 6 hours   - can restart diuretics once Na is back down to 130 - 132 range   - replace K   - Pulm consulted for Pleural effusion   - free water restriction from tomorrow   - d.w primary team                 Thank you for allowing us to participate in care of Amaya Amador MD  Feel free to contact me   [facility] [address]  Office : [phone]  Fax :[phone]

## 2021-09-08 NOTE — ED PROVIDER NOTES
ED Attending Attestation Note     Date of evaluation: 9/7/2021    This patient was seen by the advance practice provider. I have seen and examined the patient, agree with the workup, evaluation, management and diagnosis. The care plan has been discussed. I have reviewed the ECG and concur with the KAY's interpretation. My assessment reveals complaints of altered mental status. Patient is confused but has no focal neurologic deficits present. Does have a history of liver failure and symptoms are most consistent with hepatic encephalopathy. Will check laboratory studies.       Hamida Diaz MD  09/08/21 5679

## 2021-09-08 NOTE — CONSULTS
[facility]  Palliative Medicine Consultation Note      Date Of Admission:9/7/2021  Date of consult: 09/08/21  Seen by [facility] in the past:  No    Recommendations:        Went to see the pt at the bedside. No visitors present. He is alert to self only, unable to give the name of the place. He thinks it is November. He does not know why he came to the hospital. Asked if he has any family or friends that we should call, to which he does not provide an answer. D/w RN April, he has not had any visitors or calls today but reportedly his mother brought him to the hospital.     Called number listed for \"Lambert Arguelles" in his emergency contacts. Went to [employer]. Left voicemail with callback number. No other numbers listed in his emergency contacts. D/w RUBEN Michelle. If pt does have a visitor or family calls for updates, please add their number to the chart. 1. Goals of Care/Advanced Care planning/Code status: Full Code, pt is not decisional at this time. Plan to discuss with pt's NOK when able to reach them. 2. Pain: Pt denies  3. SOB: Pt denies  4. AMS/Hepatic encephalopathy: Ammonia elevated, pt currently on lactulose titrating to 3-4 stools per day  5. Disposition: TBD pending hospital course    Reason for Consult:         [x]  Goals of Care  [x]  Code Status Discussion/Advanced Care Planning   []  Psychosocial/Family Support  []  Symptom Management  []  Other (Specify)    Requesting Physician: Dr. Raleigh Pandya:  Altered mental status    History Obtained From:  electronic medical record, reason patient could not give history:  altered mental status    History of Present Illness:         Be Marquez is a 45 y.o. male with PMH of IVDU, Cirrhosis, and Hep B who presented with altered mental status. He was able to follow commands but was answering questions inappropriately. His ammonia is elevated to 141 and lactate elevated to 2.4.  He's admitted with decompensated cirrhosis and hepatic encephalopathy. Paracentesis is planned. Pulmonology was consulted to evaluate his loculated pleural effusion. Pt was recently discharged from this hospital on 8/02 after having been treated for his decompensated liver cirrhosis and pneumonia. Subjective:         Past Medical History:    History reviewed. No pertinent past medical history. Past Surgical History:    History reviewed. No pertinent surgical history. Current Medications:    Medications Prior to Admission: albuterol sulfate  (90 Base) MCG/ACT inhaler, Inhale 2 puffs into the lungs 4 times daily  Skin Protectants, Misc. (HYDROCERIN) CREA cream, Apply topically 2 times daily  spironolactone (ALDACTONE) 100 MG tablet, Take 1 tablet by mouth daily  torsemide (DEMADEX) 100 MG tablet, Take 0.5 tablets by mouth daily    Allergies:  Patient has no known allergies. Social History:    · TOBACCO: reports that he has been smoking cigarettes. He started smoking about 41 years ago. He has a 39.00 pack-year smoking history. He has never used smokeless tobacco.  · ETOH:   reports no history of alcohol use. · Patient currently lives with family ? Reportedly mother    Review of Systems -   Review of Systems: A 10 point review of systems was conducted, significant findings as notedin HPI. Objective:          Physical Exam  Constitutional:       Appearance: He is ill-appearing. Cardiovascular:      Rate and Rhythm: Normal rate. Heart sounds: Normal heart sounds. Pulmonary:      Effort: Pulmonary effort is normal.      Breath sounds: Normal breath sounds. Abdominal:      General: Bowel sounds are normal.      Palpations: Abdomen is soft. Musculoskeletal:      Right lower leg: No edema. Left lower leg: No edema. Skin:     General: Skin is warm and dry. Coloration: Skin is jaundiced. Neurological:      Mental Status: He is alert. He is disoriented.           Palliative Performance Scale:  [] 60% Ambulation reduced; Significant disease; Can't do hobbies/housework; intake normal or reduced; occasional assist; LOC full/confusion  [x] 50% Mainly sit/lie; Extensive disease; Can't do any work; Considerable assist; intake normal  Or reduced; LOC full/confusion  [] 40% Mainly in bed; Extensive disease; Mainly assist; intake normal or reduced; occasional assist; LOC full/confusion  [] 30% Bed Bound; Extensive disease; Total care; intake reduced; LOC full/confusion  [] 20% Bed Bound; Extensive disease; Total care; intake minimal; Drowsy/coma  [] 10% Bed Bound; Extensive disease; Total care; Mouth care only; Drowsy/coma  [] 0% Death    PPS: 50     Vitals:    /69   Pulse 79   Temp 98.3 °F (36.8 °C) (Axillary)   Resp 16   SpO2 96%     Labs:    BMP: Recent Labs     09/07/21 2225 09/08/21 0008 09/08/21 0627 09/08/21  0755   *  --  136 134*   K 8.0* 3.9  --  3.3*   CL 89*  --   --  96*   CO2 29  --   --  30   BUN 30*  --   --  28*   CREATININE 0.7*  --   --  0.8*   GLUCOSE 79  --   --  75     CBC:   Recent Labs     09/07/21 2225 09/08/21 0755   WBC 11.8* 9.6   HGB 11.7* 10.3*   HCT 34.6* 30.5*   * see below       LFT's:   Recent Labs     09/07/21 2225 09/08/21 0008   * 74*   ALT 46* 29   BILITOT 8.0* 7.6*   ALKPHOS 92 106     Troponin:   Recent Labs     09/07/21 2225 09/08/21 0008   TROPONINI 0.10* 0.09*     BNP: No results for input(s): BNP in the last 72 hours. ABGs: No results for input(s): PHART, VUM7XPO, PO2ART in the last 72 hours. INR:   Recent Labs     09/07/21  2312   INR 1.61*       U/A:No results for input(s): NITRITE, COLORU, PHUR, LABCAST, WBCUA, RBCUA, MUCUS, TRICHOMONAS, YEAST, BACTERIA, CLARITYU, SPECGRAV, LEUKOCYTESUR, UROBILINOGEN, BILIRUBINUR, BLOODU, GLUCOSEU, AMORPHOUS in the last 72 hours. Invalid input(s): KETONESU    CT CHEST ABDOMEN PELVIS W CONTRAST   Final Result     Shrunken and nodular liver with esophageal varices and splenomegaly    consistent with cirrhosis.   There is mild diffuse anasarca over the torso    and a small amount of ascites. No focal abscess is identified. _______________________________________________       IMPRESSION:        1. There is a partially loculated moderate to large right pleural    effusion, slightly increased compared to previous measuring up to 7.7 cm    thick anteriorly. 2.  Multiple subacute appearing right anterior rib fractures,    incompletely healed. 3.  Scattered rounded groundglass infiltrates on the left, improved since    previous. XR CHEST PORTABLE   Final Result      1. Persistent moderate right-sided pleural effusion but diminished consolidation with still central and lower lobe and pleural-based consolidations noted but improved compared to prior studies   2. Decreased left patchy airspace disease, greatly improved               CT Head WO Contrast   Final Result      Normal noncontrast CT the brain without acute hemorrhage, edema or hydrocephalus. Bilateral sinus disease predominantly involving the maxillary sinuses and ethmoid air cells            Conclusion/Time spent:         Recommendations see above    Time spent with patient and/or family: 10  Time reviewing records: 10 min   Time communicating with staff: 5 min     A total of 25 minutes spent with the patient and family on unit greater than 50% in counseling regarding palliative care and in goals of care for the patient. Thank you to Dr. Alejandro Ann for this consultation. We will continue to follow Mr. Haro's care as needed.     [address] [facility]  Inpatient Palliative Care  [phone]

## 2021-09-08 NOTE — PROGRESS NOTES
Speech Language Pathology  Dysphagia - attempt    Order received, chart reviewed, pt too lethargic at this time to participate fully in evaluation. Will follow up as pt able and schedule allows      Janett Dillon M.S./[facility]3]-SLP #[id number]  Pg.  # [phone]

## 2021-09-09 ENCOUNTER — APPOINTMENT (OUTPATIENT)
Dept: GENERAL RADIOLOGY | Age: 51
DRG: 283 | End: 2021-09-09
Payer: COMMERCIAL

## 2021-09-09 ENCOUNTER — APPOINTMENT (OUTPATIENT)
Dept: ULTRASOUND IMAGING | Age: 51
DRG: 283 | End: 2021-09-09
Payer: COMMERCIAL

## 2021-09-09 LAB
A/G RATIO: 0.6 (ref 1.1–2.2)
ALBUMIN FLUID: <0.2 G/DL
ALBUMIN SERPL-MCNC: 2.5 G/DL (ref 3.4–5)
ALP BLD-CCNC: 106 U/L (ref 40–129)
ALT SERPL-CCNC: 28 U/L (ref 10–40)
AMMONIA: 57 UMOL/L (ref 16–60)
ANION GAP SERPL CALCULATED.3IONS-SCNC: 9 MMOL/L (ref 3–16)
APPEARANCE FLUID: CLEAR
APPEARANCE FLUID: NORMAL
AST SERPL-CCNC: 63 U/L (ref 15–37)
BASOPHILS ABSOLUTE: 0.1 K/UL (ref 0–0.2)
BASOPHILS RELATIVE PERCENT: 0.7 %
BILIRUB SERPL-MCNC: 10.5 MG/DL (ref 0–1)
BILIRUBIN DIRECT: 4.2 MG/DL (ref 0–0.3)
BILIRUBIN, INDIRECT: 6.3 MG/DL (ref 0–1)
BUN BLDV-MCNC: 23 MG/DL (ref 7–20)
CALCIUM SERPL-MCNC: 8.6 MG/DL (ref 8.3–10.6)
CELL COUNT FLUID TYPE: NORMAL
CELL COUNT FLUID TYPE: NORMAL
CHLORIDE BLD-SCNC: 97 MMOL/L (ref 99–110)
CLOT EVALUATION: NORMAL
CLOT EVALUATION: NORMAL
CO2: 32 MMOL/L (ref 21–32)
COLOR FLUID: YELLOW
COLOR FLUID: YELLOW
CREAT SERPL-MCNC: 0.6 MG/DL (ref 0.9–1.3)
EOSINOPHILS ABSOLUTE: 0.3 K/UL (ref 0–0.6)
EOSINOPHILS RELATIVE PERCENT: 2.6 %
FLUID TYPE: NORMAL
GFR AFRICAN AMERICAN: >60
GFR NON-AFRICAN AMERICAN: >60
GLOBULIN: 4.1 G/DL
GLUCOSE BLD-MCNC: 119 MG/DL (ref 70–99)
GLUCOSE, FLUID: 183 MG/DL
HCT VFR BLD CALC: 31 % (ref 40.5–52.5)
HEMOGLOBIN: 10.8 G/DL (ref 13.5–17.5)
LACTATE DEHYDROGENASE: 294 U/L (ref 100–190)
LD, FLUID: 27 U/L
LYMPHOCYTES ABSOLUTE: 2.5 K/UL (ref 1–5.1)
LYMPHOCYTES RELATIVE PERCENT: 25.6 %
LYMPHOCYTES, BODY FLUID: 29 %
LYMPHOCYTES, BODY FLUID: 36 %
MACROPHAGE FLUID: 46 %
MACROPHAGE FLUID: 7 %
MAGNESIUM: 2.2 MG/DL (ref 1.8–2.4)
MCH RBC QN AUTO: 35.4 PG (ref 26–34)
MCHC RBC AUTO-ENTMCNC: 34.9 G/DL (ref 31–36)
MCV RBC AUTO: 101.5 FL (ref 80–100)
MESOTHELIAL FLUID: 15 %
MESOTHELIAL FLUID: 2 %
MONOCYTE, FLUID: 24 %
MONOCYTE, FLUID: 6 %
MONOCYTES ABSOLUTE: 0.8 K/UL (ref 0–1.3)
MONOCYTES RELATIVE PERCENT: 8.6 %
NEUTROPHIL, FLUID: 17 %
NEUTROPHIL, FLUID: 18 %
NEUTROPHILS ABSOLUTE: 6.1 K/UL (ref 1.7–7.7)
NEUTROPHILS RELATIVE PERCENT: 62.5 %
NUCLEATED CELLS FLUID: 72 /CUMM
NUCLEATED CELLS FLUID: 78 /CUMM
NUMBER OF CELLS COUNTED FLUID: 100
NUMBER OF CELLS COUNTED FLUID: 100
PDW BLD-RTO: 16.2 % (ref 12.4–15.4)
PHOSPHORUS: 3.1 MG/DL (ref 2.5–4.9)
PLATELET # BLD: 75 K/UL (ref 135–450)
PMV BLD AUTO: 8.1 FL (ref 5–10.5)
POTASSIUM SERPL-SCNC: 3.2 MMOL/L (ref 3.5–5.1)
POTASSIUM SERPL-SCNC: 3.4 MMOL/L (ref 3.5–5.1)
PROTEIN FLUID: <0.2 G/DL
RBC # BLD: 3.06 M/UL (ref 4.2–5.9)
RBC FLUID: 2100 /CUMM
RBC FLUID: 500 /CUMM
SODIUM BLD-SCNC: 135 MMOL/L (ref 136–145)
SODIUM BLD-SCNC: 138 MMOL/L (ref 136–145)
TOTAL CK: 75 U/L (ref 39–308)
TOTAL PROTEIN: 6.4 G/DL (ref 6.4–8.2)
TOTAL PROTEIN: 6.6 G/DL (ref 6.4–8.2)
WBC # BLD: 9.7 K/UL (ref 4–11)

## 2021-09-09 PROCEDURE — 82607 VITAMIN B-12: CPT

## 2021-09-09 PROCEDURE — 6370000000 HC RX 637 (ALT 250 FOR IP): Performed by: STUDENT IN AN ORGANIZED HEALTH CARE EDUCATION/TRAINING PROGRAM

## 2021-09-09 PROCEDURE — 89051 BODY FLUID CELL COUNT: CPT

## 2021-09-09 PROCEDURE — 2580000003 HC RX 258: Performed by: STUDENT IN AN ORGANIZED HEALTH CARE EDUCATION/TRAINING PROGRAM

## 2021-09-09 PROCEDURE — 84155 ASSAY OF PROTEIN SERUM: CPT

## 2021-09-09 PROCEDURE — 85025 COMPLETE CBC W/AUTO DIFF WBC: CPT

## 2021-09-09 PROCEDURE — 87070 CULTURE OTHR SPECIMN AEROBIC: CPT

## 2021-09-09 PROCEDURE — C9113 INJ PANTOPRAZOLE SODIUM, VIA: HCPCS | Performed by: STUDENT IN AN ORGANIZED HEALTH CARE EDUCATION/TRAINING PROGRAM

## 2021-09-09 PROCEDURE — 2580000003 HC RX 258

## 2021-09-09 PROCEDURE — 99233 SBSQ HOSP IP/OBS HIGH 50: CPT | Performed by: INTERNAL MEDICINE

## 2021-09-09 PROCEDURE — 92610 EVALUATE SWALLOWING FUNCTION: CPT

## 2021-09-09 PROCEDURE — 88305 TISSUE EXAM BY PATHOLOGIST: CPT

## 2021-09-09 PROCEDURE — 82550 ASSAY OF CK (CPK): CPT

## 2021-09-09 PROCEDURE — 82945 GLUCOSE OTHER FLUID: CPT

## 2021-09-09 PROCEDURE — 82140 ASSAY OF AMMONIA: CPT

## 2021-09-09 PROCEDURE — 1200000000 HC SEMI PRIVATE

## 2021-09-09 PROCEDURE — 80053 COMPREHEN METABOLIC PANEL: CPT

## 2021-09-09 PROCEDURE — 82746 ASSAY OF FOLIC ACID SERUM: CPT

## 2021-09-09 PROCEDURE — 6360000002 HC RX W HCPCS: Performed by: INTERNAL MEDICINE

## 2021-09-09 PROCEDURE — 88112 CYTOPATH CELL ENHANCE TECH: CPT

## 2021-09-09 PROCEDURE — 0W9G3ZZ DRAINAGE OF PERITONEAL CAVITY, PERCUTANEOUS APPROACH: ICD-10-PCS | Performed by: RADIOLOGY

## 2021-09-09 PROCEDURE — 36415 COLL VENOUS BLD VENIPUNCTURE: CPT

## 2021-09-09 PROCEDURE — 92526 ORAL FUNCTION THERAPY: CPT

## 2021-09-09 PROCEDURE — 83986 ASSAY PH BODY FLUID NOS: CPT

## 2021-09-09 PROCEDURE — 2700000000 HC OXYGEN THERAPY PER DAY

## 2021-09-09 PROCEDURE — 94640 AIRWAY INHALATION TREATMENT: CPT

## 2021-09-09 PROCEDURE — 2709999900 US GUIDED PARACENTESIS

## 2021-09-09 PROCEDURE — 6360000002 HC RX W HCPCS: Performed by: STUDENT IN AN ORGANIZED HEALTH CARE EDUCATION/TRAINING PROGRAM

## 2021-09-09 PROCEDURE — 82042 OTHER SOURCE ALBUMIN QUAN EA: CPT

## 2021-09-09 PROCEDURE — 6370000000 HC RX 637 (ALT 250 FOR IP): Performed by: INTERNAL MEDICINE

## 2021-09-09 PROCEDURE — 32557 INSERT CATH PLEURA W/ IMAGE: CPT | Performed by: INTERNAL MEDICINE

## 2021-09-09 PROCEDURE — 84295 ASSAY OF SERUM SODIUM: CPT

## 2021-09-09 PROCEDURE — 83735 ASSAY OF MAGNESIUM: CPT

## 2021-09-09 PROCEDURE — 83615 LACTATE (LD) (LDH) ENZYME: CPT

## 2021-09-09 PROCEDURE — 0W993ZZ DRAINAGE OF RIGHT PLEURAL CAVITY, PERCUTANEOUS APPROACH: ICD-10-PCS | Performed by: STUDENT IN AN ORGANIZED HEALTH CARE EDUCATION/TRAINING PROGRAM

## 2021-09-09 PROCEDURE — 84157 ASSAY OF PROTEIN OTHER: CPT

## 2021-09-09 PROCEDURE — 94761 N-INVAS EAR/PLS OXIMETRY MLT: CPT

## 2021-09-09 PROCEDURE — 84132 ASSAY OF SERUM POTASSIUM: CPT

## 2021-09-09 PROCEDURE — 71045 X-RAY EXAM CHEST 1 VIEW: CPT

## 2021-09-09 PROCEDURE — 87205 SMEAR GRAM STAIN: CPT

## 2021-09-09 RX ORDER — SPIRONOLACTONE 100 MG/1
100 TABLET, FILM COATED ORAL DAILY
Status: DISCONTINUED | OUTPATIENT
Start: 2021-09-09 | End: 2021-09-10 | Stop reason: HOSPADM

## 2021-09-09 RX ORDER — POTASSIUM CHLORIDE 7.45 MG/ML
10 INJECTION INTRAVENOUS
Status: COMPLETED | OUTPATIENT
Start: 2021-09-09 | End: 2021-09-09

## 2021-09-09 RX ORDER — PANTOPRAZOLE SODIUM 40 MG/10ML
40 INJECTION, POWDER, LYOPHILIZED, FOR SOLUTION INTRAVENOUS DAILY
Status: DISCONTINUED | OUTPATIENT
Start: 2021-09-09 | End: 2021-09-10 | Stop reason: HOSPADM

## 2021-09-09 RX ORDER — DEXTROSE MONOHYDRATE 50 MG/ML
INJECTION, SOLUTION INTRAVENOUS CONTINUOUS
Status: DISCONTINUED | OUTPATIENT
Start: 2021-09-09 | End: 2021-09-09

## 2021-09-09 RX ORDER — HYDROCHLOROTHIAZIDE 25 MG/1
25 TABLET ORAL DAILY
Status: DISCONTINUED | OUTPATIENT
Start: 2021-09-09 | End: 2021-09-10

## 2021-09-09 RX ORDER — POTASSIUM CHLORIDE 20 MEQ/1
40 TABLET, EXTENDED RELEASE ORAL ONCE
Status: COMPLETED | OUTPATIENT
Start: 2021-09-09 | End: 2021-09-09

## 2021-09-09 RX ORDER — POTASSIUM CHLORIDE 20 MEQ/1
TABLET, EXTENDED RELEASE ORAL
Status: DISPENSED
Start: 2021-09-09 | End: 2021-09-10

## 2021-09-09 RX ADMIN — ALBUTEROL SULFATE 2.5 MG: 2.5 SOLUTION RESPIRATORY (INHALATION) at 16:07

## 2021-09-09 RX ADMIN — SPIRONOLACTONE 100 MG: 100 TABLET ORAL at 11:40

## 2021-09-09 RX ADMIN — POTASSIUM CHLORIDE 10 MEQ: 7.45 INJECTION INTRAVENOUS at 11:20

## 2021-09-09 RX ADMIN — FUROSEMIDE 40 MG: 10 INJECTION, SOLUTION INTRAMUSCULAR; INTRAVENOUS at 17:12

## 2021-09-09 RX ADMIN — SODIUM CHLORIDE 25 ML: 9 INJECTION, SOLUTION INTRAVENOUS at 11:19

## 2021-09-09 RX ADMIN — LACTULOSE 20 G: 20 SOLUTION ORAL at 21:06

## 2021-09-09 RX ADMIN — DEXTROSE MONOHYDRATE: 50 INJECTION, SOLUTION INTRAVENOUS at 01:55

## 2021-09-09 RX ADMIN — CEFTRIAXONE 1000 MG: 1 INJECTION, POWDER, FOR SOLUTION INTRAMUSCULAR; INTRAVENOUS at 16:52

## 2021-09-09 RX ADMIN — LACTULOSE 20 G: 20 SOLUTION ORAL at 09:25

## 2021-09-09 RX ADMIN — FUROSEMIDE 40 MG: 10 INJECTION, SOLUTION INTRAMUSCULAR; INTRAVENOUS at 09:25

## 2021-09-09 RX ADMIN — LACTULOSE 20 G: 20 SOLUTION ORAL at 16:46

## 2021-09-09 RX ADMIN — ALBUTEROL SULFATE 2.5 MG: 2.5 SOLUTION RESPIRATORY (INHALATION) at 08:29

## 2021-09-09 RX ADMIN — POTASSIUM CHLORIDE 10 MEQ: 7.45 INJECTION INTRAVENOUS at 09:25

## 2021-09-09 RX ADMIN — PANTOPRAZOLE SODIUM 40 MG: 40 INJECTION, POWDER, FOR SOLUTION INTRAVENOUS at 11:40

## 2021-09-09 RX ADMIN — SODIUM CHLORIDE 25 ML: 9 INJECTION, SOLUTION INTRAVENOUS at 09:23

## 2021-09-09 RX ADMIN — POTASSIUM CHLORIDE 10 MEQ: 7.45 INJECTION INTRAVENOUS at 12:46

## 2021-09-09 RX ADMIN — HYDROCHLOROTHIAZIDE 25 MG: 25 TABLET ORAL at 16:46

## 2021-09-09 RX ADMIN — POTASSIUM CHLORIDE 10 MEQ: 7.45 INJECTION INTRAVENOUS at 14:12

## 2021-09-09 RX ADMIN — Medication 10 ML: at 21:46

## 2021-09-09 RX ADMIN — POTASSIUM CHLORIDE 40 MEQ: 1500 TABLET, EXTENDED RELEASE ORAL at 23:42

## 2021-09-09 ASSESSMENT — PAIN SCALES - GENERAL
PAINLEVEL_OUTOF10: 0

## 2021-09-09 ASSESSMENT — ENCOUNTER SYMPTOMS
ABDOMINAL PAIN: 0
SHORTNESS OF BREATH: 0
COUGH: 0
ABDOMINAL DISTENTION: 1

## 2021-09-09 NOTE — PROGRESS NOTES
Nephrology  Note                                                                                                                                                                                                                                                                                                                                                               Office : [phone]     Fax :[phone]              Patient's Name: Janell Gupta  9/9/2021      Good UO with lasix   ++ edema   No N/V/D     History reviewed. No pertinent surgical history. History reviewed. No pertinent family history. reports that he has been smoking cigarettes. He started smoking about 41 years ago. He has a 39.00 pack-year smoking history. He has never used smokeless tobacco. He reports current drug use. Drug: IV. He reports that he does not drink alcohol. Allergies:  Patient has no known allergies.     Current Medications:    potassium chloride 10 mEq/100 mL IVPB (Peripheral Line), Q1H  pantoprazole (PROTONIX) injection 40 mg, Daily  sodium chloride flush 0.9 % injection 5-40 mL, 2 times per day  sodium chloride flush 0.9 % injection 5-40 mL, PRN  0.9 % sodium chloride infusion, PRN  ondansetron (ZOFRAN-ODT) disintegrating tablet 4 mg, Q8H PRN   Or  ondansetron (ZOFRAN) injection 4 mg, Q6H PRN  polyethylene glycol (GLYCOLAX) packet 17 g, Daily PRN  acetaminophen (TYLENOL) tablet 650 mg, Q6H PRN   Or  acetaminophen (TYLENOL) suppository 650 mg, Q6H PRN  cefTRIAXone (ROCEPHIN) 1000 mg IVPB in 50 mL D5W minibag, Q24H  lactulose (CHRONULAC) 10 GM/15ML solution 20 g, TID  [Held by provider] rifaximin (XIFAXAN) tablet 200 mg, TID  albuterol (PROVENTIL) nebulizer solution 2.5 mg, 4x daily  furosemide (LASIX) injection 40 mg, BID          Physical exam:     Vitals:  /66   Pulse 85   Temp 97.5 °F (36.4 °C) (Axillary)   Resp 16   SpO2 95%   Constitutional:  AA  Skin: no rash, turgor wnl  Heent:  eomi, mmm  Neck: no Hang Bonilla MD  Feel free to contact me   [facility] [address]  Office : [phone]  Fax :[phone]

## 2021-09-09 NOTE — PROGRESS NOTES
Palliative Care Chart Review  and Check in Note:     NAME:  Shelby Caruso  Admit Date: 9/7/2021  Hospital Day:  Hospital Day: 3   Current Code status: Full Code    Palliative care is continuing to following Mr. Winter Chamorro for symptom management,  and goals of care discussion as needed. Patient's chart reviewed today 9/9/21. Saw pt at the bedside, working with SLP. He is slightly more alert this morning. He requests that this NP call his step brother [employer]. Spoke with pt's step brother Ronald Nichols. He reports pt lives alone, typically independent with ADLs. He does not have a HCPOA. His NOK would be his four siblings, [employer], Unknown Anabela and [place]. Will attempt to complete HCPOA with the pt when he is more alert. The following are the currently established goals/code status, and Symptom management.      Goals of care: Continue with current management    Code status: Full Code    Discharge plan: Pending PT/OT recs Cheri Saint, APRN - CNP  09/09/21  8:59 AM

## 2021-09-09 NOTE — PLAN OF CARE
Problem: Falls - Risk of:  Goal: Will remain free from falls  Description: Will remain free from falls  9/9/2021 0636 by Velvet Moody RN  Outcome: Ongoing   Fall protocol in place, pt remains free from falls

## 2021-09-09 NOTE — CONSULTS
Gastroenterology Consult Note      Patient: Nba Knowles  : 1970  Acct#:      Date:  2021    Subjective:       History of Present Illness  Patient is a 45 y.o.  male PMH hepatitis B, IVDU, cirrhosis, who is seen in consult for 'decompensated liver cirrhosis - hepatic encephalopathy.'  Patient presented to the ED on  with complaints of altered mental status. ED course  Vitals: BP: 142/87, Temp: 98.2 °F, Pulse: 67, Resp: 16, SpO2: 96%  Labs: Na 126, albumin 2.7, ALT 46, , ammonia 141, bilirubin 8.0, WBC 11.8, lactate 2.4, INR 1.61. Imaging: CXR- 1. Persistent moderate right-sided pleural effusion but diminished consolidation with still central and lower lobe and pleural-based consolidations noted but improved compared to prior studies 2. Decreased left patchy airspace disease, greatly improved. CTH- Normal noncontrast CT the brain without acute hemorrhage, edema or hydrocephalus. CT chest/abd/pelvis- Shrunken and nodular liver with esophageal varices and splenomegaly consistent with cirrhosis.  There is mild diffuse anasarca over the torso and a small amount of ascites.  No focal abscess is identified. Interventions: Given 20g packet of lactulose     Patient was admitted in late July of this year and was seen by GI for cirrhosis. Patient was to follow up outpatient for an EGD and hepatitis management on 2021. Labs at that time showed Pleural fluid c/w hepatic hydrothorax, HepBsAb (+), HepBsAg reactive, HepCAb (+), HepBcore (+), HCV quant < 10 IU/ mL. When I spoke to patient this morning he is alert and oriented x4. Notably patient has bilateral scleral icterus. Abdomen is distended, nontender, bowel sounds x4. Patient states that he has been nauseous for the past week. Denies vomiting, diarrhea, melena, hematochezia, abdominal pain. States that he has not had a bowel movement in weeks. Patient states that he uses IV fentanyl.  Last used on day of admission 9/7. States he was incarcerated years ago. Denies family or personal history of IBS, colon cancer, Crohns disease, ulcerative colitis. History reviewed. No pertinent past medical history. History reviewed. No pertinent surgical history. Past Endoscopic History: no prior    Decompensation History:  Hepatic Encephalopathy: never  Variceal bleeding: never  Ascites: recent diagnosis, bloating for a year  Spontaneous bacterial peritonitis: never  Renal Failure: never     Admission Meds  No current facility-administered medications on file prior to encounter. Current Outpatient Medications on File Prior to Encounter   Medication Sig Dispense Refill    albuterol sulfate  (90 Base) MCG/ACT inhaler Inhale 2 puffs into the lungs 4 times daily 1 Inhaler 3    Skin Protectants, Misc. (HYDROCERIN) CREA cream Apply topically 2 times daily 1 Container 0    spironolactone (ALDACTONE) 100 MG tablet Take 1 tablet by mouth daily 30 tablet 3    torsemide (DEMADEX) 100 MG tablet Take 0.5 tablets by mouth daily 30 tablet 3         Allergies  No Known Allergies     Social history:  Social History     Tobacco Use    Smoking status: Current Every Day Smoker     Packs/day: 1.00     Years: 39.00     Pack years: 39.00     Types: Cigarettes     Start date: 1980    Smokeless tobacco: Never Used   Substance Use Topics    Alcohol use: Never        Family History:   History reviewed. No pertinent family history. Review of Systems  As noted in HPI       Physical Exam:  Blood pressure 129/66, pulse 85, temperature 97.5 °F (36.4 °C), temperature source Axillary, resp. rate 16, SpO2 95 %. General appearance: alert, cooperative, no distress, appears stated age  Eyes: Bilateral scleral icterus  Head: Normocephalic, without obvious abnormality  Lungs: Diffuse wheezing. Heart: regular rate and rhythm, normal S1 and S2, systolic murmur noted. Abdomen: Distended, non-tender.  Bowel sounds normal. No masses,  no organomegaly. Extremities: Bl lower extremities brown discoloration   Skin: warm and dry, no jaundice  Neuro: Grossly intact, A&OX3      Data Review:    Recent Labs     09/07/21 2225 09/07/21 2225 09/08/21 0755 09/08/21 1146 09/09/21  0631   WBC 11.8*  --  9.6  --  9.7   HGB 11.7*  --  10.3*  --  10.8*   HCT 34.6*  --  30.5*  --  31.0*   .7*  --  102.3*  --  101.5*   *   < > see below 73* 75*    < > = values in this interval not displayed. Recent Labs     09/07/21 2225 09/08/21 0008 09/08/21 0627 09/08/21 0755 09/08/21 1146 09/08/21  1848 09/09/21  0022 09/09/21  0631   *  --    < > 134*   < > 133* 135* 138   K 8.0* 3.9  --  3.3*  --   --   --  3.2*   CL 89*  --   --  96*  --   --   --  97*   CO2 29  --   --  30  --   --   --  32   PHOS  --   --   --  3.4  --   --   --  3.1   BUN 30*  --   --  28*  --   --   --  23*   CREATININE 0.7*  --   --  0.8*  --   --   --  0.6*    < > = values in this interval not displayed. Recent Labs     09/07/21 2225 09/08/21 0008 09/09/21  0631   * 74* 63*   ALT 46* 29 28   BILIDIR 2.2* 2.8* 4.2*   BILITOT 8.0* 7.6* 10.5*   ALKPHOS 92 106 106     Recent Labs     09/07/21 2225   LIPASE 34.0     Recent Labs     09/07/21  2312   PROTIME 18.5*   INR 1.61*     Imaging Studies:    CT CHEST ABDOMEN PELVIS W CONTRAST   Final Result     Shrunken and nodular liver with esophageal varices and splenomegaly    consistent with cirrhosis. There is mild diffuse anasarca over the torso    and a small amount of ascites. No focal abscess is identified. _______________________________________________       IMPRESSION:        1. There is a partially loculated moderate to large right pleural    effusion, slightly increased compared to previous measuring up to 7.7 cm    thick anteriorly. 2.  Multiple subacute appearing right anterior rib fractures,    incompletely healed.    3.  Scattered rounded groundglass infiltrates on the left, improved since

## 2021-09-09 NOTE — PROGRESS NOTES
Progress Note    Admit Date: 9/7/2021  Diet: Diet NPO    CC: AMS    Interval history:   - mental status much improved this AM: appears to be at baseline  - pulm plans for thora today  - starting on lasix this AM, Na still @ 135, off D5 now    HPI:  Pt is a 44 y/o M w/ a PMHx of IVDU, Cirrhosis, and Hep B who presents with AMS. Per ED report, patient arrived in a state of altered mental status but was conversational and following commands. Pt was reportedly confused, and repeatedly answered questions with the phrase \"about 2 weeks ago\" even when inappropriate. Pt also reportedly was able to follow commands enough to have demonstrated asterixis. Pt was recently discharged from this hospital on 8/02 after having been treated for his decompensated liver cirrhosis and a PNA c/b a large R-sided pleural effusion. Pt seen at beside in ED but at this time was minimally responsive. Pt was noted to groan with abdominal palpation. CT Chest/Abd/Pelvis was ordered to r/o an acute intra-abdominal process. CTH was WNL, and a CXR was improved from his previous films. Admission ammonia was elevated to 141 and lactate was also elevated to 2.4. Transaminases demonstrate an alcoholic injury pattern, with a AST of 74 and and ALT of 29. Indirect Bilirubin was 5.8, compared to a direct bilirubin of 2.2. Albumin was decreased to 2.7 while INR was elevated to 1.61. Pt admitted for ongoing work-up of his AMS and mgmt of his chronic liver issues.      Medications:     Scheduled Meds:   potassium chloride  10 mEq IntraVENous Q1H    sodium chloride flush  5-40 mL IntraVENous 2 times per day    cefTRIAXone (ROCEPHIN) IV  1,000 mg IntraVENous Q24H    lactulose  20 g Oral TID    [Held by provider] rifaximin  200 mg Oral TID    albuterol  2.5 mg Nebulization 4x daily    furosemide  40 mg IntraVENous BID     Continuous Infusions:   sodium chloride 25 mL (09/09/21 0923)     PRN Meds:    Objective:   Vitals:   T-max:  Patient Vitals for the past 8 hrs:   BP Temp Temp src Pulse Resp SpO2   09/09/21 0832     16 95 %   09/09/21 0805 129/66 97.5 °F (36.4 °C) Axillary 85 16 96 %   09/09/21 0304 121/65 98.5 °F (36.9 °C) Axillary 86 16 97 %       Intake/Output Summary (Last 24 hours) at 9/9/2021 0947  Last data filed at 9/9/2021 0600  Gross per 24 hour   Intake 310 ml   Output 100 ml   Net 210 ml       Review of Systems   Unable to perform ROS: Mental status change   Constitutional: Negative for chills and fever. Respiratory: Negative for cough and shortness of breath. Cardiovascular: Negative for chest pain and leg swelling. Gastrointestinal: Positive for abdominal distention. Negative for abdominal pain. Neurological: Negative for dizziness and light-headedness. Physical Exam  HENT:      Mouth/Throat:      Mouth: Mucous membranes are moist.      Pharynx: Oropharynx is clear. Eyes:      Extraocular Movements: Extraocular movements intact. Pupils: Pupils are equal, round, and reactive to light. Cardiovascular:      Rate and Rhythm: Normal rate and regular rhythm. Pulses: Normal pulses. Heart sounds: Murmur (systolic murmur at LUSB) heard. Pulmonary:      Effort: Pulmonary effort is normal.      Breath sounds: Wheezing (Diffuse. Worse R>L) present. No rales (Worse on R). Comments: Decreased breath sounds R>L  Abdominal:      General: Bowel sounds are normal. There is distension. Palpations: Abdomen is soft. Tenderness: There is no abdominal tenderness. Skin:     Comments: Significant tree-bark/woody appearance of bilateral lower extremities with raised brown dried areas   Neurological:      General: No focal deficit present. Mental Status: He is alert and oriented to person, place, and time.          LABS:    CBC:   Recent Labs     09/07/21  2225 09/07/21  2225 09/08/21  0755 09/08/21  1146 09/09/21  0631   WBC 11.8*  --  9.6  --  9.7   HGB 11.7*  --  10.3*  --  10.8*   HCT 34.6*  -- 30.5*  --  31.0*   *   < > see below 73* 75*   .7*  --  102.3*  --  101.5*    < > = values in this interval not displayed. Renal:    Recent Labs     09/07/21 2225 09/08/21 0008 09/08/21  0627 09/08/21 0755 09/08/21  1146 09/08/21  1848 09/09/21  0022 09/09/21  0631   *  --    < > 134*   < > 133* 135* 138   K 8.0* 3.9  --  3.3*  --   --   --  3.2*   CL 89*  --   --  96*  --   --   --  97*   CO2 29  --   --  30  --   --   --  32   BUN 30*  --   --  28*  --   --   --  23*   CREATININE 0.7*  --   --  0.8*  --   --   --  0.6*   GLUCOSE 79  --   --  75  --   --   --  119*   CALCIUM 8.3  --   --  8.3  --   --   --  8.6   MG  --   --   --  2.00  --   --   --  2.20   PHOS  --   --   --  3.4  --   --   --  3.1   ANIONGAP 8  --   --  8  --   --   --  9    < > = values in this interval not displayed. Hepatic:   Recent Labs     09/07/21 2225 09/07/21 2225 09/08/21 0008 09/08/21 0755 09/09/21  0631   *  --  74*  --  63*   ALT 46*  --  29  --  28   BILITOT 8.0*  --  7.6*  --  10.5*   BILIDIR 2.2*  --  2.8*  --  4.2*   PROT 7.3  --  6.3*  --  6.6   LABALBU 2.7*   < > 2.4* 2.2* 2.5*   ALKPHOS 92  --  106  --  106    < > = values in this interval not displayed. Troponin:   Recent Labs     09/07/21 2225 09/08/21 0008   TROPONINI 0.10* 0.09*     BNP: No results for input(s): BNP in the last 72 hours. Lipids: No results for input(s): CHOL, HDL in the last 72 hours. Invalid input(s): LDLCALCU, TRIGLYCERIDE  ABGs:  No results for input(s): PHART, SFD7VIJ, PO2ART, MRN2IXX, BEART, THGBART, N7LMCVFL, XXU1EDM in the last 72 hours. INR:   Recent Labs     09/07/21  2312   INR 1.61*     Lactate: No results for input(s): LACTATE in the last 72 hours. Cultures:  -----------------------------------------------------------------  RAD:   CT CHEST ABDOMEN PELVIS W CONTRAST   Final Result     Shrunken and nodular liver with esophageal varices and splenomegaly    consistent with cirrhosis.   There is mild diffuse anasarca over the torso    and a small amount of ascites. No focal abscess is identified. _______________________________________________       IMPRESSION:        1. There is a partially loculated moderate to large right pleural    effusion, slightly increased compared to previous measuring up to 7.7 cm    thick anteriorly. 2.  Multiple subacute appearing right anterior rib fractures,    incompletely healed. 3.  Scattered rounded groundglass infiltrates on the left, improved since    previous. XR CHEST PORTABLE   Final Result      1. Persistent moderate right-sided pleural effusion but diminished consolidation with still central and lower lobe and pleural-based consolidations noted but improved compared to prior studies   2. Decreased left patchy airspace disease, greatly improved               CT Head WO Contrast   Final Result      Normal noncontrast CT the brain without acute hemorrhage, edema or hydrocephalus. Bilateral sinus disease predominantly involving the maxillary sinuses and ethmoid air cells      US GUIDED PARACENTESIS    (Results Pending)       Assessment/Plan:   46 y/o M w/ a PMHx of IVDU, Cirrhosis, and Hep B p/w AMS    Decompensated cirrhosis, Hepatic encephalopathy: Ammonia elevated. Need to rule out cause of exacerbation such as GIB or infection especially in context of known varices and ascites although WBC normalized and Hb stable. Mental status and ammonia improved with lactulose  - lactulose TID: gooal 3 BM daily  - GI consult  - FU blood cxs  - FOBT  - SLP: recs appreciated  - daily LFTs  - palliative consult  - PT/OT    Ascites 2/2 cirrhosis: abd exam relatively benign today  - cont ceftriaxone  - diagnostic paracentesis: FU para studies  - IV lasix 40mg BID + spironolactone 100mg daily    Esophageal varices: no signs of acute bleeding  - hold AC for now    Hyponatremia: corrected fairly quickly from 126 to 134 without intervention.  Now has normal serum osm and stable Na  - nephro consult: recs appreciated    Chronic loculated pleural effusion: recent evaluation expected to be hepatic hydrothorax. Given hepatic hydrothorax want to r/o infection  - pulm c/s: plan for thora  - FU thora studies  - diuresis per above    Macrocytic anemia: stable to priors  - folate, B12      Code Status: full code  FEN: NPO pending SLP eval  PPX: SCDs, protonix  DISPO: Jael Shaffer MD, PGY-1  09/09/21  9:47 AM    This patient has been staffed and discussed with Dr. Porter Nickerson.

## 2021-09-09 NOTE — PROCEDURES
Dada Womack is a 45 y.o. male patient. 1. Altered mental status, unspecified altered mental status type    2. Hyperammonemia ([address]      History reviewed. No pertinent past medical history. Blood pressure 116/75, pulse 94, temperature 97.8 °F (36.6 °C), temperature source Oral, resp. rate 16, SpO2 98 %. Thoracentesis    Date/Time: 9/9/2021 5:53 PM  Performed by: Kaylee Reyes MD  Authorized by: Kaylee Reyes MD   Consent: Verbal consent obtained. Written consent obtained. Risks and benefits: risks, benefits and alternatives were discussed  Consent given by: patient  Patient understanding: patient states understanding of the procedure being performed  Patient consent: the patient's understanding of the procedure does not match consent given  Site marked: the operative site was marked  Imaging studies: imaging studies not available  Required items: required blood products, implants, devices, and special equipment available  Patient identity confirmed: verbally with patient and arm band  Time out: Immediately prior to procedure a \"time out\" was called to verify the correct patient, procedure, equipment, support staff and site/side marked as required. Procedure purpose: diagnostic and therapeutic  Indications: pleural effusion  Preparation: Patient was prepped and draped in the usual sterile fashion.   Local anesthesia used: yes    Anesthesia:  Local anesthesia used: yes  Local Anesthetic: lidocaine 2% without epinephrine    Sedation:  Patient sedated: no    Preparation: skin prepped with alcohol  Ultrasound guidance: yes  Location: right posterior  Intercostal space: 8th  Puncture method: over-the-needle catheter  Needle size: 20  Catheter size: 8 [place]  Number of attempts: 1  Drainage amount: 2000 ml  Drainage characteristics: serosanguinous  Patient tolerance: patient tolerated the procedure well with no immediate complications  Chest x-ray performed: yes  Chest x-ray interpreted by radiologist.  Comments: EBL < 5 cc        Dilma Tuttle MD  9/9/2021    I was present, gloved and assisted during the procedure. It was done under US guidance. Blood loss is minimal  2000 mL of shira color fluid removed.     On US the fluid was loculated

## 2021-09-09 NOTE — PROGRESS NOTES
Diet and Intervention  Diet Solids Recommendation: Regular  Liquid Consistency Recommendation: Thin  Recommended Form of Meds: Whole with water  Therapeutic Interventions: Diet tolerance monitoring;Oral care; Patient/Family education    Compensatory Swallowing Strategies  Upright as possible for all oral intake    Treatment/Goals  Pt to be seen to address the following goals:  1-The patient will tolerate recommended diet without observed clinical signs of aspiration    2- The patient Urich Age will verbalize/demonstrate understanding of dysphagia recommendations  9/9: Educated pt to purpose of visit, s/s of aspiration, concern if aspiration occurs, rationale for diet recommendation/strategies to reduce risk for aspiration and instruction to notify staff if any signs emerge. Pt stated comprehension   Cont goal    General  Chart Reviewed: Yes  Behavior/Cognition: Alert; Cooperative;Pleasant mood  Respiratory Status: O2 via nasual cannula  Communication Observation: Functional  Follows Directions: Complex  Dentition: Some missing teeth  Patient Positioning: Upright in bed  Baseline Vocal Quality: Normal  Volitional Cough: Strong  Prior Dysphagia History: none  Consistencies Administered: Reg solid; Dysphagia Pureed (Dysphagia I); Thin - teaspoon; Thin - cup; Thin - straw    Vision/Hearing  Vision  Vision: Within Functional Limits  Hearing  Hearing: Within functional limits    Oral Motor Deficits  Oral/Motor  Oral Motor: Within functional limits    Oral Phase Dysfunction  WFL     Indicators of Pharyngeal Phase Dysfunction  Pt analyzed with ice chips, water via cup/straw, puree and solid texture. Pt initially exhibited intermittent cough with liquids. However this subsided with no coughing with 6 ounces of additional liquids, including 3 ounces of uninterrupted swallows. Good swallow movement upon palpation of anterior neck.     Prognosis  Prognosis  Prognosis for safe diet advancement: good  Individuals consulted  Consulted and agree with results and recommendations: Patient;RN    Education  Patient Education: pt educated to purpose of visit  Patient Education Response: Verbalizes understanding  Safety Devices in place: Yes  Type of devices: Call light within reach       Therapy Time  SLP Individual Minutes  Time In: 0900  Time Out: 3094  Minutes: 25     Plan:  Recommended diet: regular diet/thin liquids- if any s/s of aspiration emerge, or there is respiratory decline,  make NPO until further evaluated by SLP  Dc recommendation: most likely will not require follow up  Pt therapy goal: to eat, do I get to eat? Pt dc goal: to go home  Janett Dillon M.S./[facility]-SLP #[id number]  Pg.  # [phone]  Needs met prior to leaving room, call light within reach, d/w RN  This document will serve as a dc summary if pt dc prior to next visit  9/9/2021 10:49 AM

## 2021-09-09 NOTE — PLAN OF CARE
Problem: Falls - Risk of:  Goal: Will remain free from falls  Description: Will remain free from falls  Outcome: Ongoing   Assisted pt to [facility], incont and had large loose stool. Pt gait unsteady. Pt remains free from falls. Fall protocol in place.

## 2021-09-09 NOTE — PROGRESS NOTES
Patient returned back from x-ray and had thoracentesis at bed side. All due meds given this shift. Patient is alert and oriented. No complaints of pain.

## 2021-09-10 VITALS
TEMPERATURE: 97.9 F | SYSTOLIC BLOOD PRESSURE: 135 MMHG | DIASTOLIC BLOOD PRESSURE: 70 MMHG | RESPIRATION RATE: 14 BRPM | HEART RATE: 85 BPM | OXYGEN SATURATION: 92 %

## 2021-09-10 LAB
A/G RATIO: 0.6 (ref 1.1–2.2)
ALBUMIN FLUID: <0.2 G/DL
ALBUMIN SERPL-MCNC: 2.6 G/DL (ref 3.4–5)
ALP BLD-CCNC: 123 U/L (ref 40–129)
ALT SERPL-CCNC: 33 U/L (ref 10–40)
ANION GAP SERPL CALCULATED.3IONS-SCNC: 9 MMOL/L (ref 3–16)
AST SERPL-CCNC: 72 U/L (ref 15–37)
BASOPHILS ABSOLUTE: 0.1 K/UL (ref 0–0.2)
BASOPHILS RELATIVE PERCENT: 0.8 %
BILIRUB SERPL-MCNC: 7.5 MG/DL (ref 0–1)
BUN BLDV-MCNC: 19 MG/DL (ref 7–20)
CALCIUM SERPL-MCNC: 8.2 MG/DL (ref 8.3–10.6)
CHLORIDE BLD-SCNC: 94 MMOL/L (ref 99–110)
CO2: 30 MMOL/L (ref 21–32)
CREAT SERPL-MCNC: 0.7 MG/DL (ref 0.9–1.3)
EOSINOPHILS ABSOLUTE: 0.3 K/UL (ref 0–0.6)
EOSINOPHILS RELATIVE PERCENT: 2.9 %
FLUID TYPE: NORMAL
FOLATE: 7.06 NG/ML (ref 4.78–24.2)
GFR AFRICAN AMERICAN: >60
GFR NON-AFRICAN AMERICAN: >60
GLOBULIN: 4.3 G/DL
GLUCOSE BLD-MCNC: 141 MG/DL (ref 70–99)
GLUCOSE, FLUID: 132 MG/DL
HCT VFR BLD CALC: 34.1 % (ref 40.5–52.5)
HEMOGLOBIN: 11.4 G/DL (ref 13.5–17.5)
LD, FLUID: 39 U/L
LYMPHOCYTES ABSOLUTE: 2 K/UL (ref 1–5.1)
LYMPHOCYTES RELATIVE PERCENT: 20.6 %
MAGNESIUM: 2 MG/DL (ref 1.8–2.4)
MCH RBC QN AUTO: 34.4 PG (ref 26–34)
MCHC RBC AUTO-ENTMCNC: 33.5 G/DL (ref 31–36)
MCV RBC AUTO: 102.7 FL (ref 80–100)
MONOCYTES ABSOLUTE: 0.7 K/UL (ref 0–1.3)
MONOCYTES RELATIVE PERCENT: 7.1 %
NEUTROPHILS ABSOLUTE: 6.5 K/UL (ref 1.7–7.7)
NEUTROPHILS RELATIVE PERCENT: 68.6 %
OCCULT BLOOD DIAGNOSTIC: NORMAL
PDW BLD-RTO: 16.8 % (ref 12.4–15.4)
PH, BODY FLUID: 8.4
PLATELET # BLD: 76 K/UL (ref 135–450)
PLATELET SLIDE REVIEW: ABNORMAL
PMV BLD AUTO: 8.3 FL (ref 5–10.5)
POTASSIUM REFLEX MAGNESIUM: 3.4 MMOL/L (ref 3.5–5.1)
PROTEIN FLUID: 0.4 G/DL
RBC # BLD: 3.32 M/UL (ref 4.2–5.9)
SLIDE REVIEW: ABNORMAL
SODIUM BLD-SCNC: 133 MMOL/L (ref 136–145)
TOTAL PROTEIN: 6.9 G/DL (ref 6.4–8.2)
VITAMIN B-12: >2000 PG/ML (ref 211–911)
WBC # BLD: 9.5 K/UL (ref 4–11)

## 2021-09-10 PROCEDURE — 97535 SELF CARE MNGMENT TRAINING: CPT

## 2021-09-10 PROCEDURE — 94640 AIRWAY INHALATION TREATMENT: CPT

## 2021-09-10 PROCEDURE — 36415 COLL VENOUS BLD VENIPUNCTURE: CPT

## 2021-09-10 PROCEDURE — 6370000000 HC RX 637 (ALT 250 FOR IP): Performed by: STUDENT IN AN ORGANIZED HEALTH CARE EDUCATION/TRAINING PROGRAM

## 2021-09-10 PROCEDURE — 83735 ASSAY OF MAGNESIUM: CPT

## 2021-09-10 PROCEDURE — 97116 GAIT TRAINING THERAPY: CPT

## 2021-09-10 PROCEDURE — 85025 COMPLETE CBC W/AUTO DIFF WBC: CPT

## 2021-09-10 PROCEDURE — 97530 THERAPEUTIC ACTIVITIES: CPT

## 2021-09-10 PROCEDURE — 80053 COMPREHEN METABOLIC PANEL: CPT

## 2021-09-10 PROCEDURE — 97166 OT EVAL MOD COMPLEX 45 MIN: CPT

## 2021-09-10 PROCEDURE — 2700000000 HC OXYGEN THERAPY PER DAY

## 2021-09-10 PROCEDURE — 92526 ORAL FUNCTION THERAPY: CPT

## 2021-09-10 PROCEDURE — C9113 INJ PANTOPRAZOLE SODIUM, VIA: HCPCS | Performed by: STUDENT IN AN ORGANIZED HEALTH CARE EDUCATION/TRAINING PROGRAM

## 2021-09-10 PROCEDURE — 6360000002 HC RX W HCPCS: Performed by: INTERNAL MEDICINE

## 2021-09-10 PROCEDURE — 97162 PT EVAL MOD COMPLEX 30 MIN: CPT

## 2021-09-10 PROCEDURE — 99233 SBSQ HOSP IP/OBS HIGH 50: CPT | Performed by: INTERNAL MEDICINE

## 2021-09-10 PROCEDURE — 82270 OCCULT BLOOD FECES: CPT

## 2021-09-10 PROCEDURE — 6360000002 HC RX W HCPCS: Performed by: STUDENT IN AN ORGANIZED HEALTH CARE EDUCATION/TRAINING PROGRAM

## 2021-09-10 PROCEDURE — 2580000003 HC RX 258: Performed by: STUDENT IN AN ORGANIZED HEALTH CARE EDUCATION/TRAINING PROGRAM

## 2021-09-10 PROCEDURE — 6370000000 HC RX 637 (ALT 250 FOR IP): Performed by: INTERNAL MEDICINE

## 2021-09-10 PROCEDURE — 94761 N-INVAS EAR/PLS OXIMETRY MLT: CPT

## 2021-09-10 PROCEDURE — 99232 SBSQ HOSP IP/OBS MODERATE 35: CPT | Performed by: INTERNAL MEDICINE

## 2021-09-10 RX ORDER — POTASSIUM CHLORIDE 20 MEQ/1
40 TABLET, EXTENDED RELEASE ORAL ONCE
Status: COMPLETED | OUTPATIENT
Start: 2021-09-10 | End: 2021-09-10

## 2021-09-10 RX ORDER — LACTULOSE 10 G/15ML
20 SOLUTION ORAL 3 TIMES DAILY
Qty: 2700 ML | Refills: 0 | Status: SHIPPED | OUTPATIENT
Start: 2021-09-10 | End: 2021-09-10

## 2021-09-10 RX ORDER — POTASSIUM CHLORIDE 20 MEQ/1
TABLET, EXTENDED RELEASE ORAL
Status: CANCELLED | OUTPATIENT
Start: 2021-09-10

## 2021-09-10 RX ORDER — LACTULOSE 10 G/15ML
20 SOLUTION ORAL 3 TIMES DAILY
Qty: 2700 ML | Refills: 3 | Status: SHIPPED | OUTPATIENT
Start: 2021-09-10 | End: 2021-10-10

## 2021-09-10 RX ORDER — TORSEMIDE 100 MG/1
50 TABLET ORAL DAILY
Qty: 30 TABLET | Refills: 3 | Status: SHIPPED | OUTPATIENT
Start: 2021-09-10

## 2021-09-10 RX ORDER — SPIRONOLACTONE 100 MG/1
100 TABLET, FILM COATED ORAL DAILY
Qty: 30 TABLET | Refills: 3 | Status: SHIPPED | OUTPATIENT
Start: 2021-09-10

## 2021-09-10 RX ADMIN — ALBUTEROL SULFATE 2.5 MG: 2.5 SOLUTION RESPIRATORY (INHALATION) at 08:06

## 2021-09-10 RX ADMIN — LACTULOSE 20 G: 20 SOLUTION ORAL at 14:13

## 2021-09-10 RX ADMIN — PANTOPRAZOLE SODIUM 40 MG: 40 INJECTION, POWDER, FOR SOLUTION INTRAVENOUS at 09:03

## 2021-09-10 RX ADMIN — Medication 10 ML: at 09:03

## 2021-09-10 RX ADMIN — LACTULOSE 20 G: 20 SOLUTION ORAL at 09:03

## 2021-09-10 RX ADMIN — POTASSIUM CHLORIDE 40 MEQ: 1500 TABLET, EXTENDED RELEASE ORAL at 14:13

## 2021-09-10 RX ADMIN — ALBUTEROL SULFATE 2.5 MG: 2.5 SOLUTION RESPIRATORY (INHALATION) at 13:11

## 2021-09-10 RX ADMIN — FUROSEMIDE 40 MG: 10 INJECTION, SOLUTION INTRAMUSCULAR; INTRAVENOUS at 09:03

## 2021-09-10 RX ADMIN — SPIRONOLACTONE 100 MG: 100 TABLET ORAL at 09:03

## 2021-09-10 ASSESSMENT — ENCOUNTER SYMPTOMS
ABDOMINAL DISTENTION: 1
ABDOMINAL PAIN: 0
COUGH: 0
SHORTNESS OF BREATH: 0

## 2021-09-10 ASSESSMENT — PAIN SCALES - GENERAL
PAINLEVEL_OUTOF10: 0
PAINLEVEL_OUTOF10: 0

## 2021-09-10 NOTE — PROGRESS NOTES
NUTRITION NOTE   Admission Date: 9/7/2021     Type and Reason for Visit: Positive Nutrition Screen    NUTRITION RECOMMENDATIONS:   1. PO Diet: Continue current regular low sodium diet. NUTRITION ASSESSMENT:  Positive IP Screen for decreased appetite: Pt diet was recently advanced yesterday, consuming % PO intake x 1 meal per documentation. No current measured wt documented and limited wt hx. Noted pt has discharge orders per EMR. Patient admitted d/t AMS    PMH significant for: IVDU, Cirrhosis, and Hep B    MALNUTRITION ASSESSMENT      Malnutrition Status: Insufficient data   -limited wt hx per EMR    Wt Readings from Last 50 Encounters:   08/19/21 209 lb (94.8 kg)   08/05/21 195 lb (88.5 kg)   08/02/21 195 lb 15.8 oz (88.9 kg)     NUTRITION DIAGNOSIS No nutrition diagnosis at this time. NUTRITION INTERVENTION  Food and/or Nutrient Delivery:  Continue Current Diet  Nutrition Education/Counseling:  No recommendation at this time      The patient will still be monitored per nutrition standards of care. Consult dietitian if nutrition interventions essential to patient care is needed.      Alla Alonso, [address] [place] [address]  [place]:  [phone]  Office:  [phone]

## 2021-09-10 NOTE — CARE COORDINATION
CTN contacted Kell with [facility] [phone]. They have accepted this patient and will pull referral from [facility].  They will contact patient and make arrangements for SVETA by 9/12  Electronically signed by Karolyn Montesinos LPN on 4/99/7273 at 4:92 PM
SW Following, Pt from home w/family, Pt is active with [facility] for HHC, Pt plans to return home at DC, Pt has a Palliative Consult pending, Pulm following, planning diuresis and repeat chest X-ray. SW will continue to follow for DC needs/recs.     Electronically signed by LORETTA Ball, ESTEPHANIA on 9/8/2021 at 10:21 AM   [phone]
SW Following, Pt from home w/family, Pt is active with [facility] for HHC, Pt plans to return home at DC, Pt has thoracentesis planned for today, Pt is on Lasix, PT/OT evals pending for DC recs, SW will continue to follow.     Electronically signed by LORETTA Rivera, ESTEPHANIA on 9/9/2021 at 1:30 PM   [phone]
of pick-up or delivery - cash, check, or card accepted)     Able to afford home medications/ co-pay costs: Yes    ADLS:  Current PT AM-PAC Score:   /24  Current OT AM-PAC Score: 17 /24      DISCHARGE Disposition: Home with Home Health Care: [facility]     LOC at discharge: Not Applicable  BLAKE Completed: Yes    Notification completed in HENS/PAS?:  Not Applicable    IMM Completed:   Not Indicated    Transportation:  Transportation PLAN for discharge: family   Mode of Transport: [address] ordered at discharge: Yes  [address]: [facility]  Phone: [phone]  Fax: [phone]  Orders faxed: Yes    Durable Medical Equipment:  DME Provider: None  Equipment obtained during hospitalization:     Home Oxygen and Respiratory Equipment:  Oxygen needed at discharge?: No  [address]: Not Applicable  Portable tank available for discharge?: Not Indicated    Dialysis:  Dialysis patient: No    Dialysis Center:  Not Applicable    Additional CM Notes: Pt from home w/family, Pt to return home at DC and resume HHC w/[facility], SW spoke w/James at [facility] and confirmed resumption of care. Pt's family will transport home. The Plan for Transition of Care is related to the following treatment goals of Hyperammonemia ([facility] [address] [E72.20]  Encephalopathy acute [G93.40]  Altered mental status, unspecified altered mental status type [R41.82]    The Patient and/or patient representative Jovita So and his family were provided with a choice of provider and agrees with the discharge plan Yes    Freedom of choice list was provided with basic dialogue that supports the patient's individualized plan of care/goals and shares the quality data associated with the providers.  Yes    Care Transitions patient: No    LORETTA Rivera, [facility] [employer]  Case Management Department  Ph: [phone]  Fax: [phone]

## 2021-09-10 NOTE — FLOWSHEET NOTE
09/10/21 1425   Encounter Summary   Services provided to: Patient   Referral/Consult From:   (Spiritual consult. )   Continue Visiting   (9/10/21, CATHY. )   Complexity of Encounter Moderate   Length of Encounter 15 minutes   Routine   Type Initial   Assessment Calm; Approachable   Intervention Active listening;Nurtured hope   Outcome Expressed gratitude   The patient wants to review his HCPOA paperwork before signing. He will request us when he is ready.

## 2021-09-10 NOTE — PROGRESS NOTES
Nephrology  Note                                                                                                                                                                                                                                                                                                                                                               Office : [phone]     Fax :[phone]              Patient's Name: Aurea Kerns  9/10/2021      Good UO with lasix   ++ edema   No N/V/D     History reviewed. No pertinent surgical history. History reviewed. No pertinent family history. reports that he has been smoking cigarettes. He started smoking about 41 years ago. He has a 39.00 pack-year smoking history. He has never used smokeless tobacco. He reports current drug use. Drug: IV. He reports that he does not drink alcohol. Allergies:  Patient has no known allergies.     Current Medications:    pantoprazole (PROTONIX) injection 40 mg, Daily  spironolactone (ALDACTONE) tablet 100 mg, Daily  sodium chloride flush 0.9 % injection 5-40 mL, 2 times per day  sodium chloride flush 0.9 % injection 5-40 mL, PRN  0.9 % sodium chloride infusion, PRN  ondansetron (ZOFRAN-ODT) disintegrating tablet 4 mg, Q8H PRN   Or  ondansetron (ZOFRAN) injection 4 mg, Q6H PRN  polyethylene glycol (GLYCOLAX) packet 17 g, Daily PRN  acetaminophen (TYLENOL) tablet 650 mg, Q6H PRN   Or  acetaminophen (TYLENOL) suppository 650 mg, Q6H PRN  lactulose (CHRONULAC) 10 GM/15ML solution 20 g, TID  albuterol (PROVENTIL) nebulizer solution 2.5 mg, 4x daily  furosemide (LASIX) injection 40 mg, BID          Physical exam:     Vitals:  /70   Pulse 85   Temp 97.9 °F (36.6 °C) (Oral)   Resp 14   SpO2 92%   Constitutional:  AA  Skin: no rash, turgor wnl  Heent:  eomi, mmm  Neck: no bruits or jvd noted  Cardiovascular:  S1, S2 without m/r/g  Respiratory: dec BS   Abdomen:  +bs, soft, nt, nd  Ext: +++ lower extremity edema  Psychiatric: mood and affect appropriate  Musculoskeletal:  Rom, muscular strength intact    Data:   Labs:  CBC:   Recent Labs     09/08/21  0755 09/08/21  0755 09/08/21  1146 09/09/21  0631 09/10/21  1106   WBC 9.6  --   --  9.7 9.5   HGB 10.3*  --   --  10.8* 11.4*   PLT see below   < > 73* 75* 76*    < > = values in this interval not displayed. BMP:    Recent Labs     09/08/21  0755 09/08/21  0755 09/08/21  1146 09/09/21  0022 09/09/21  0631 09/09/21  1943 09/10/21  1106   *  --    < > 135* 138  --  133*   K 3.3*   < >  --   --  3.2* 3.4* 3.4*   CL 96*  --   --   --  97*  --  94*   CO2 30  --   --   --  32  --  30   BUN 28*  --   --   --  23*  --  19   CREATININE 0.8*  --   --   --  0.6*  --  0.7*   GLUCOSE 75  --   --   --  119*  --  141*    < > = values in this interval not displayed. Ca/Mg/Phos:   Recent Labs     09/08/21  0755 09/09/21  0631 09/10/21  1106   CALCIUM 8.3 8.6 8.2*   MG 2.00 2.20 2.00   PHOS 3.4 3.1  --      Hepatic:   Recent Labs     09/08/21  0008 09/09/21  0631 09/10/21  1106   AST 74* 63* 72*   ALT 29 28 33   BILITOT 7.6* 10.5* 7.5*   ALKPHOS 106 106 123     Troponin:   Recent Labs     09/07/21  2225 09/08/21  0008   TROPONINI 0.10* 0.09*     BNP: No results for input(s): BNP in the last 72 hours. Lipids: No results for input(s): CHOL, TRIG, HDL, LDLCALC, LABVLDL in the last 72 hours. ABGs: No results for input(s): PHART, PO2ART, HHQ9DFE in the last 72 hours.   INR:   Recent Labs     09/07/21  2312   INR 1.61*     UA:  Recent Labs     09/08/21  1745   COLORU Yellow   CLARITYU Clear   GLUCOSEU Negative   BILIRUBINUR MODERATE*   KETUA Negative   SPECGRAV 1.015   BLOODU LARGE*   PHUR 5.0   PROTEINU Negative   UROBILINOGEN 4.0*   NITRU Negative   LEUKOCYTESUR Negative   LABMICR YES   URINETYPE NotGiven      Urine Microscopic:   Recent Labs     09/08/21  1745   BACTERIA Rare*   WBCUA 0-2   RBCUA 3-4   EPIU 2-5     Urine Culture: No results for input(s): Jamee Anna in the last 72 hours. Urine Chemistry:   Recent Labs     09/08/21  1745   NAUR <20             IMAGING:  XR CHEST PORTABLE   Final Result      Negative for pneumothorax. US GUIDED PARACENTESIS   Final Result   IMPRESSION : Ultrasound guided drainage of 70 mL of clear yellow ascites. Blood loss : minimal (less than 5 cc)         CT CHEST ABDOMEN PELVIS W CONTRAST   Final Result     Shrunken and nodular liver with esophageal varices and splenomegaly    consistent with cirrhosis. There is mild diffuse anasarca over the torso    and a small amount of ascites. No focal abscess is identified. _______________________________________________       IMPRESSION:        1. There is a partially loculated moderate to large right pleural    effusion, slightly increased compared to previous measuring up to 7.7 cm    thick anteriorly. 2.  Multiple subacute appearing right anterior rib fractures,    incompletely healed. 3.  Scattered rounded groundglass infiltrates on the left, improved since    previous. XR CHEST PORTABLE   Final Result      1. Persistent moderate right-sided pleural effusion but diminished consolidation with still central and lower lobe and pleural-based consolidations noted but improved compared to prior studies   2. Decreased left patchy airspace disease, greatly improved               CT Head WO Contrast   Final Result      Normal noncontrast CT the brain without acute hemorrhage, edema or hydrocephalus. Bilateral sinus disease predominantly involving the maxillary sinuses and ethmoid air cells          Assessment/Plan   1. Hyponatremia     2. HTN    3. Anemia    4. Acid- base/ Electrolyte imbalance     5. Cirrhosis     6. Pleural effusion     7.  Anasarca     Plan   - Hyponatremia better   - cont diuretics   - aldactone   - replace K   - Pulm consulted for Pleural effusion   - free water restriction                   Thank you for allowing us to participate in care of Nory Trejo Mikel Hutchison MD  Feel free to contact me   [facility] [address]  Office : [phone]  Fax :[phone]

## 2021-09-10 NOTE — PROGRESS NOTES
Patient has d/c orders and both family and patient agree with plan. Patient was taken off tele. Meds to bed completed. Patient understands he needs to take prescription in to get a walker. IV access was removed and patient was taken to family in wheelchair by nurse with all belongings.

## 2021-09-10 NOTE — PROGRESS NOTES
[address] [place] [address] [facility]  GI Progress Note          Shelby Caruso is a 45 y.o. male patient. 1. Pleural effusion    2. Altered mental status, unspecified altered mental status type    3. Hyperammonemia ([address]        Admit Date: 2021    Subjective:       No c/o no abd pain N/V GI bleeding    Scheduled Meds:   pantoprazole  40 mg IntraVENous Daily    spironolactone  100 mg Oral Daily    potassium chloride        sodium chloride flush  5-40 mL IntraVENous 2 times per day    lactulose  20 g Oral TID    albuterol  2.5 mg Nebulization 4x daily    furosemide  40 mg IntraVENous BID     Objective:       Patient Vitals for the past 24 hrs:   BP Temp Temp src Pulse Resp SpO2   09/10/21 0722 (!) 120/58 97.6 °F (36.4 °C) Oral 81 14 96 %   09/10/21 0503 (!) 146/81 98 °F (36.7 °C) Oral 86 18 95 %   21 2341 (!) 148/72 98.4 °F (36.9 °C) Oral 89 18 97 %   21 2057 138/74 98 °F (36.7 °C) Oral 97 18 96 %   21 1556 116/75 97.8 °F (36.6 °C) Oral 94 16 98 %   21 1224 132/72 98.4 °F (36.9 °C) Oral 81 16 95 %   21 0832     16 95 %   21 0805 129/66 97.5 °F (36.4 °C) Axillary 85 16 96 %       Exam:  VITALS:  BP (!) 120/58   Pulse 81   Temp 97.6 °F (36.4 °C) (Oral)   Resp 14   SpO2 96%   TEMPERATURE:  Current - Temp: 97.6 °F (36.4 °C); Max - Temp  Av °F (36.7 °C)  Min: 97.5 °F (36.4 °C)  Max: 98.4 °F (36.9 °C)    NAD  General appearance: alert, appears stated age, cooperative and no distress  Abdomen: soft mild ascites NT  AAOx3, No asterixis or encephalopathy        Recent Labs     21  2225 21  2225 21  0755 21  1146 21  0631   WBC 11.8*  --  9.6  --  9.7   HGB 11.7*  --  10.3*  --  10.8*   HCT 34.6*  --  30.5*  --  31.0*   .7*  --  102.3*  --  101.5*   *   < > see below 73* 75*    < > = values in this interval not displayed.      Recent Labs     21  2225 21  0008 21  0755 21  1146 21  1848 21  0022 09/09/21 0631 09/09/21  1943   *   < > 134*   < > 133* 135* 138  --    K 8.0*   < > 3.3*  --   --   --  3.2* 3.4*   CL 89*  --  96*  --   --   --  97*  --    CO2 29  --  30  --   --   --  32  --    PHOS  --   --  3.4  --   --   --  3.1  --    BUN 30*  --  28*  --   --   --  23*  --    CREATININE 0.7*  --  0.8*  --   --   --  0.6*  --     < > = values in this interval not displayed. Recent Labs     09/07/21  2225 09/08/21 0008 09/09/21 0631   * 74* 63*   ALT 46* 29 28   BILIDIR 2.2* 2.8* 4.2*   BILITOT 8.0* 7.6* 10.5*   ALKPHOS 92 106 106     Recent Labs     09/07/21 2225   LIPASE 34.0     Ascitic and pleural fluid without infection    Assessment:       Active Problems:    Encephalopathy acute    Hyponatremia    Altered mental status    Hyperammonemia (HCC)    Anasarca  Resolved Problems:    * No resolved hospital problems.  *      HE is better, has FU appt 9/13  No SBP on ascites fluid  HCC screening up to date  No GI bleeding    Recommendations:       Continue Lactulose at DC  Lasix 40 mg and Aldactone 100 mg a day  Serial renal labs  Follow up with Dr Juan Toney 9/13 as scheduled  Will sign off recall with questions    Kiya Holman MD  9/10/2021  8:03 AM

## 2021-09-10 NOTE — PROGRESS NOTES
Pulmonary & Critical Care Medicine    Admit Date: 2021  PCP: DEVEN Handy CNP    CC:  Pleural effusion   Events of Last 24 hours:   No major events over the past 24 hours. At the time of evaluation he was sitting in chair. He states that his breathing is better. There is no fever or chills. Vitals:  Tmax:  VITALS:  BP (!) 120/58   Pulse 81   Temp 97.6 °F (36.4 °C) (Oral)   Resp 14   SpO2 91%   24HR INTAKE/OUTPUT:    Intake/Output Summary (Last 24 hours) at 9/10/2021 1019  Last data filed at 9/10/2021 0903  Gross per 24 hour   Intake 950 ml   Output 975 ml   Net -25 ml     CURRENT PULSE OXIMETRY:  SpO2: 91 %  24HR PULSE OXIMETRY RANGE:  SpO2  Av.4 %  Min: 91 %  Max: 98 %    EXAM:  General: No distress. Alert. Eyes: PERRL. No sclera icterus. No conjunctival injection. ENT: No discharge. Moist oral mucosa   Neck: Trachea midline. Neck is supple   Resp: No accessory muscle use. Diminished air entry on the right side   CV: Regular rate. Regular rhythm. No mumur or rub. No edema. GI: Non-tender. Non-distended. Normal bowel sounds. Neuro: Awake. Speech is clear. Psych: No anxiety or agitation. Medications:    IV:   sodium chloride 100 mL/hr at 21 1650         Scheduled Meds:   pantoprazole  40 mg IntraVENous Daily    spironolactone  100 mg Oral Daily    potassium chloride        sodium chloride flush  5-40 mL IntraVENous 2 times per day    lactulose  20 g Oral TID    albuterol  2.5 mg Nebulization 4x daily    furosemide  40 mg IntraVENous BID         Diet: ADULT DIET; Regular; Low Sodium (2 gm); 2000 ml     Results:  CBC:   Recent Labs     21  2225 21  2225 21  0755 21  1146 21  0631   WBC 11.8*  --  9.6  --  9.7   HGB 11.7*  --  10.3*  --  10.8*   HCT 34.6*  --  30.5*  --  31.0*   .7*  --  102.3*  --  101.5*   *   < > see below 73* 75*    < > = values in this interval not displayed.      BMP:   Recent Labs 09/07/21 2225 09/08/21  0008 09/08/21  0755 09/08/21  1146 09/08/21  1848 09/09/21  0022 09/09/21  0631 09/09/21  1943   *   < > 134*   < > 133* 135* 138  --    K 8.0*   < > 3.3*  --   --   --  3.2* 3.4*   CL 89*  --  96*  --   --   --  97*  --    CO2 29  --  30  --   --   --  32  --    PHOS  --   --  3.4  --   --   --  3.1  --    BUN 30*  --  28*  --   --   --  23*  --    CREATININE 0.7*  --  0.8*  --   --   --  0.6*  --     < > = values in this interval not displayed. LIVER PROFILE:   Recent Labs     09/07/21 2225 09/08/21  0008 09/09/21  0631   * 74* 63*   ALT 46* 29 28   LIPASE 34.0  --   --    BILIDIR 2.2* 2.8* 4.2*   BILITOT 8.0* 7.6* 10.5*   ALKPHOS 92 106 106     PT/INR:   Recent Labs     09/07/21 2312   PROTIME 18.5*   INR 1.61*     APTT:   Recent Labs     09/07/21 2312   APTT 21.7*     UA:  Recent Labs     09/08/21  1745   COLORU Yellow   PHUR 5.0   WBCUA 0-2   RBCUA 3-4   BACTERIA Rare*   CLARITYU Clear   SPECGRAV 1.015   LEUKOCYTESUR Negative   UROBILINOGEN 4.0*   BILIRUBINUR MODERATE*   BLOODU LARGE*   GLUCOSEU Negative         Assessment/Plan:  45 y.o. male with     Hepatic hydrothorax:  Fluid on the thoracentesis is transudate with low LD and low protein. CXR still showing significant effusion in spite of draining 2 liters. On ultrasound imaging there was some loculation. I placed the needle at the place were there is maximum loculation and fluid is still transudate in nature. I don't believe putting chest tube will be a right thing to do as he will have persistent leak. Recommend optimizing diuretics management. I will sign off. Please call with any questions or concerns.        Hetal Dong MD

## 2021-09-10 NOTE — PLAN OF CARE
Problem: Falls - Risk of:  Goal: Will remain free from falls  Description: Will remain free from falls  Outcome: Ongoing  Note: Patient is a fall risk. See Fall Risk assessment for details. Bed is in low, lock position; call light/belongings within reach. No attempts to get out of bed have been made, calls appropriately when assistance is needed. [employer] camera in room for safety. Bed alarm and hourly rounds in place for safety. Will continue to monitor and reassess throughout shift.         Problem: Skin Integrity:  Goal: Will show no infection signs and symptoms  Description: Will show no infection signs and symptoms  Outcome: Ongoing

## 2021-09-10 NOTE — PROGRESS NOTES
Progress Note    Admit Date: 9/7/2021  Diet: ADULT DIET; Regular; Low Sodium (2 gm); 2000 ml    CC: AMS    Interval history:   - mental status continues to improve  - on IV lasix 40mg BID, restarted spironolactone  - repleted K  - thora and para not suggestive of infection: likely all related to cirrhosis  - stopped CTX    HPI:  Pt is a 46 y/o M w/ a PMHx of IVDU, Cirrhosis, and Hep B who presents with AMS. Per ED report, patient arrived in a state of altered mental status but was conversational and following commands. Pt was reportedly confused, and repeatedly answered questions with the phrase \"about 2 weeks ago\" even when inappropriate. Pt also reportedly was able to follow commands enough to have demonstrated asterixis. Pt was recently discharged from this hospital on 8/02 after having been treated for his decompensated liver cirrhosis and a PNA c/b a large R-sided pleural effusion. Pt seen at beside in ED but at this time was minimally responsive. Pt was noted to groan with abdominal palpation. CT Chest/Abd/Pelvis was ordered to r/o an acute intra-abdominal process. CTH was WNL, and a CXR was improved from his previous films. Admission ammonia was elevated to 141 and lactate was also elevated to 2.4. Transaminases demonstrate an alcoholic injury pattern, with a AST of 74 and and ALT of 29. Indirect Bilirubin was 5.8, compared to a direct bilirubin of 2.2. Albumin was decreased to 2.7 while INR was elevated to 1.61. Pt admitted for ongoing work-up of his AMS and mgmt of his chronic liver issues.      Medications:     Scheduled Meds:   pantoprazole  40 mg IntraVENous Daily    spironolactone  100 mg Oral Daily    potassium chloride        sodium chloride flush  5-40 mL IntraVENous 2 times per day    lactulose  20 g Oral TID    albuterol  2.5 mg Nebulization 4x daily    furosemide  40 mg IntraVENous BID     Continuous Infusions:   sodium chloride 100 mL/hr at 09/09/21 1650     PRN Meds:    Objective:   Vitals:   T-max:  Patient Vitals for the past 8 hrs:   BP Temp Temp src Pulse Resp SpO2   09/10/21 0806      91 %   09/10/21 0722 (!) 120/58 97.6 °F (36.4 °C) Oral 81 14 96 %   09/10/21 0503 (!) 146/81 98 °F (36.7 °C) Oral 86 18 95 %       Intake/Output Summary (Last 24 hours) at 9/10/2021 2230  Last data filed at 9/10/2021 7991  Gross per 24 hour   Intake 950 ml   Output 775 ml   Net 175 ml       Review of Systems   Unable to perform ROS: Mental status change   Constitutional: Negative for chills and fever. Respiratory: Negative for cough and shortness of breath. Cardiovascular: Negative for chest pain and leg swelling. Gastrointestinal: Positive for abdominal distention. Negative for abdominal pain. Neurological: Negative for dizziness and light-headedness. Physical Exam  HENT:      Mouth/Throat:      Mouth: Mucous membranes are moist.      Pharynx: Oropharynx is clear. Eyes:      Extraocular Movements: Extraocular movements intact. Pupils: Pupils are equal, round, and reactive to light. Cardiovascular:      Rate and Rhythm: Normal rate and regular rhythm. Pulses: Normal pulses. Heart sounds: Murmur (systolic murmur at LUSB) heard. Pulmonary:      Effort: Pulmonary effort is normal.      Breath sounds: Wheezing (Diffuse. Worse R>L) and rales (Worse on R) present. Comments: Decreased breath sounds R>L  Abdominal:      General: Bowel sounds are normal. There is distension. Palpations: Abdomen is soft. Tenderness: There is no abdominal tenderness. Skin:     Comments: Significant tree-bark/woody appearance of bilateral lower extremities with raised brown dried areas   Neurological:      General: No focal deficit present. Mental Status: He is alert and oriented to person, place, and time.          LABS:    CBC:   Recent Labs     09/07/21  2225 09/07/21  2225 09/08/21  0755 09/08/21  1146 09/09/21  0631   WBC 11.8*  --  9.6  --  9.7 HGB 11.7*  --  10.3*  --  10.8*   HCT 34.6*  --  30.5*  --  31.0*   *   < > see below 73* 75*   .7*  --  102.3*  --  101.5*    < > = values in this interval not displayed. Renal:    Recent Labs     09/07/21 2225 09/08/21 0008 09/08/21 0755 09/08/21  1146 09/08/21  1848 09/09/21  0022 09/09/21  0631 09/09/21  1943   *   < > 134*   < > 133* 135* 138  --    K 8.0*   < > 3.3*  --   --   --  3.2* 3.4*   CL 89*  --  96*  --   --   --  97*  --    CO2 29  --  30  --   --   --  32  --    BUN 30*  --  28*  --   --   --  23*  --    CREATININE 0.7*  --  0.8*  --   --   --  0.6*  --    GLUCOSE 79  --  75  --   --   --  119*  --    CALCIUM 8.3  --  8.3  --   --   --  8.6  --    MG  --   --  2.00  --   --   --  2.20  --    PHOS  --   --  3.4  --   --   --  3.1  --    ANIONGAP 8  --  8  --   --   --  9  --     < > = values in this interval not displayed. Hepatic:   Recent Labs     09/07/21 2225 09/07/21 2225 09/08/21 0008 09/08/21 0755 09/09/21 0631   *  --  74*  --  63*   ALT 46*  --  29  --  28   BILITOT 8.0*  --  7.6*  --  10.5*   BILIDIR 2.2*  --  2.8*  --  4.2*   PROT 7.3  --  6.3*  --  6.4  6.6   LABALBU 2.7*   < > 2.4* 2.2* 2.5*   ALKPHOS 92  --  106  --  106    < > = values in this interval not displayed. Troponin:   Recent Labs     09/07/21 2225 09/08/21  0008   TROPONINI 0.10* 0.09*     BNP: No results for input(s): BNP in the last 72 hours. Lipids: No results for input(s): CHOL, HDL in the last 72 hours. Invalid input(s): LDLCALCU, TRIGLYCERIDE  ABGs:  No results for input(s): PHART, LBB7IHO, PO2ART, DTT6TKI, BEART, THGBART, B4BNBNOM, HNC7NUB in the last 72 hours. INR:   Recent Labs     09/07/21  2312   INR 1.61*     Lactate: No results for input(s): LACTATE in the last 72 hours. Cultures:  -----------------------------------------------------------------  RAD:   XR CHEST PORTABLE   Final Result      Negative for pneumothorax.       [address]   Final Result   IMPRESSION : Ultrasound guided drainage of 70 mL of clear yellow ascites. Blood loss : minimal (less than 5 cc)         CT CHEST ABDOMEN PELVIS W CONTRAST   Final Result     Shrunken and nodular liver with esophageal varices and splenomegaly    consistent with cirrhosis. There is mild diffuse anasarca over the torso    and a small amount of ascites. No focal abscess is identified. _______________________________________________       IMPRESSION:        1. There is a partially loculated moderate to large right pleural    effusion, slightly increased compared to previous measuring up to 7.7 cm    thick anteriorly. 2.  Multiple subacute appearing right anterior rib fractures,    incompletely healed. 3.  Scattered rounded groundglass infiltrates on the left, improved since    previous. XR CHEST PORTABLE   Final Result      1. Persistent moderate right-sided pleural effusion but diminished consolidation with still central and lower lobe and pleural-based consolidations noted but improved compared to prior studies   2. Decreased left patchy airspace disease, greatly improved               CT Head WO Contrast   Final Result      Normal noncontrast CT the brain without acute hemorrhage, edema or hydrocephalus. Bilateral sinus disease predominantly involving the maxillary sinuses and ethmoid air cells          Assessment/Plan:   44 y/o M w/ a PMHx of IVDU, Cirrhosis, and Hep B p/w AMS    Decompensated cirrhosis, Hepatic encephalopathy: Ammonia elevated. Need to rule out cause of exacerbation such as GIB or infection especially in context of known varices and ascites although WBC normalized and Hb stable.  Mental status and ammonia improved with lactulose  - lactulose TID: gooal 3 BM daily  - GI following  - FU blood cxs  - FOBT  - SLP: recs appreciated  - daily LFTs  - palliative consult  - PT/OT    Ascites 2/2 cirrhosis: diagnostic para negative for infection  - FU para studies  - IV lasix 40mg BID + spironolactone 100mg daily:  - d/c with tors 100mg daily + spirornolacteonne 100mg daily    Esophageal varices: no signs of acute bleeding  - hold AC for now    Hyponatremia: resolved  - nephro consult: recs appreciated    Chronic loculated pleural effusion: recent evaluation expected to be hepatic hydrothorax. Thora negative for infection or malignancy  - diuresis per above    Macrocytic anemia: stable to priors  - folate, B12      Code Status: full code  FEN: Regular diet, low sodium, fluid restriction 2000ml  PPX: SCDs, protonix  DISPO: possible discharge today    Lia Majano MD, PGY-1  09/10/21  9:38 AM    This patient has been staffed and discussed with Dr. Sonia Lang.

## 2021-09-10 NOTE — PROGRESS NOTES
Physical Therapy    Facility/Department: [address] [facility] [address]  Initial Assessment/Treatment    NAME: Bryan Munoz  : 1970  MRN: [id number]    Date of Service: 9/10/2021    Discharge Recommendations: Bryan Munoz scored a 19/24 on the AM-PAC short mobility form. Current research shows that an AM-PAC score of 18 or greater is typically associated with a discharge to the patient's home setting. Based on the patient's AM-PAC score and their current functional mobility deficits, it is recommended that the patient have 2-3 sessions per week of Physical Therapy at d/c to increase the patient's independence. At this time, this patient demonstrates the endurance and safety to discharge home with ** (home vs OP services) and a follow up treatment frequency of 2-3x/wk. Please see assessment section for further patient specific details. If patient discharges prior to next session this note will serve as a discharge summary. Please see below for the latest assessment towards goals. PT Equipment Recommendations  Equipment Needed: Yes  Mobility Devices: Nobie Bolckow: Rolling    Assessment   Body structures, Functions, Activity limitations: Decreased functional mobility ; Decreased endurance  Assessment: 46 yo demo low endurance/willingness to move about much today. Walked short distance in [place] with use of walker. Does state he feels better with walker & required less A with walker. Recommend wheeled walker for home use. Refused to try stairs although has 17 to enter his apt. States he will just sit on his butt if he gets tired. Plans for home at dc with A prn.   Treatment Diagnosis: impaired mobility/low endurance  Prognosis: Fair  Decision Making: Medium Complexity  PT Education: Goals;PT Role;Plan of Care;Gait Training;Functional Mobility Training  Patient Education: verbalized understanding  REQUIRES PT FOLLOW UP: Yes  Activity Tolerance  Activity Tolerance: Patient limited by endurance cooking, grocery shopping)  Ambulation Assistance: Independent  Transfer Assistance: Independent  Active : No  Patient's  Info: Pt's friend/roommate drives  Occupation: Unemployed  Leisure & Hobbies: Playing pool  Additional Comments: Pt does not recall falls but stated he does not know what happened the past couple of days. Friend works & will not always be there. Objective          AROM RLE (degrees)  RLE AROM: WFL  AROM LLE (degrees)  LLE AROM : WFL  Strength RLE  Strength RLE: WFL  Strength LLE  Strength LLE: WFL     Sensation  Overall Sensation Status: WFL  Bed mobility  Supine to Sit: Stand by assistance (with rail)  Scooting: Stand by assistance  Transfers  Sit to Stand: Stand by assistance;Contact guard assistance (CGA from bed, SBA from chair)  Stand to sit: Stand by assistance  Ambulation  Ambulation?: Yes  More Ambulation?: Yes  Ambulation 1  Surface: level tile  Device: No Device  Assistance: Contact guard assistance  Quality of Gait: flexed posture, appears weak but no LOB  Distance: 10' x 2  Ambulation 2  Surface - 2: level tile  Device 2: Rolling Walker  Assistance 2: Stand by assistance  Quality of Gait 2: steady gait, appeared more confident, no LOB; flexed posture but able to slightly correct with cues  Distance: 61'  Stairs/Curb  Stairs?: No (pt refused. \"I'll just do them at home. \"  \"I'll sit if I need to.\")     Balance  Sitting - Static: Good  Standing - Static: Good (with walker)  Standing - Dynamic: Good (with walker)    Treatment included gait/transfer training, pt education.       Plan   Plan  Times per week: 2-5  Current Treatment Recommendations: Balance Training, Functional Mobility Training, Transfer Training, Gait Training, Stair training, Safety Education & Training  Safety Devices  Type of devices: Call light within reach, Chair alarm in place, Nurse notified, Left in chair                                                      AM-PAC Score  AM-PAC Inpatient Mobility Raw Score : 19 (09/10/21 1350)  AM-PAC Inpatient T-Scale Score : 45.44 (09/10/21 1350)  Mobility Inpatient CMS 0-100% Score: 41.77 (09/10/21 1350)  Mobility Inpatient CMS G-Code Modifier : CK (09/10/21 1350)          Goals  Short term goals  Time Frame for Short term goals: dc  Short term goal 1: Transfers S  Short term goal 2: Ambulate 150' with RW S  Short term goal 3: up/down flight of steps with rail SBA  Patient Goals   Patient goals : hopes to go home today       Therapy Time   Individual Concurrent Group Co-treatment   Time In 0915         Time Out 1000         Minutes 45           Timed Code Treatment Minutes:   30    Total Treatment Minutes:  [facility]

## 2021-09-10 NOTE — PROGRESS NOTES
Patient has order for discharge and understands all d/c plans. Tried contacting sister in law for a ride and no answer. Will try again soon.  #[phone]

## 2021-09-10 NOTE — PROGRESS NOTES
Palliative Care Chart Review  and Check in Note:     NAME:  Nory Trejo  Admit Date: 9/7/2021  Hospital Day:  Hospital Day: 4   Current Code status: Full Code    Palliative care is continuing to following Mr. Rajeev Camarena for symptom management,  and goals of care discussion as needed. Patient's chart reviewed today 9/10/21. Met with the pt at the bedside. He is much more alert today, sitting up in his chair. Discussed his understanding of his medical condition. We also discussed completing advance directives given that he is alert and oriented today. Explained legal NOK hierarchy. Pt expressed interest in completing a HCPOA, but wanted to talk to his family first. Printed the documents so that he can review them. Offered to see if  could stop by this afternoon to assist, which he was agreeable to. The following are the currently established goals/code status, and Symptom management. Goals of care: Continue with current management. Code status: Full Code    Discharge plan: Pt from home with [facility], pending PT/OT elbert for recs.        DEVEN Cage - CNP09/10/21  11:08 AM

## 2021-09-10 NOTE — PROGRESS NOTES
steps today, \"I'll just sit to rest if I have to\" Pt declined to participate in other ADLs but received education on activity modifications and DME. Pt may benefit from continued therapy at this facility to maximize ADL independence at home. Treatment Diagnosis: Impaired ADL and functional mobility  Prognosis: Good  Decision Making: Medium Complexity  OT Education: OT Role;Plan of Care;ADL Adaptive Strategies;Transfer Training;Equipment  Patient Education: Pt demonstrated understanding of education  REQUIRES OT FOLLOW UP: Yes  Activity Tolerance  Activity Tolerance: Patient limited by fatigue  Safety Devices  Safety Devices in place: Yes  Type of devices: Call light within reach; Left in chair;Chair alarm in place;Nurse notified           Treatment Diagnosis: Impaired ADL and functional mobility      Restrictions  Position Activity Restriction  Other position/activity restrictions: up with A prn    Subjective   General  Chart Reviewed: Yes  Additional Pertinent Hx: Pt was admitted 9/7/21 for hyperammonemia and altered mental status. Pt presents with Hep C, pleural effusion, and cirrhosis of liver. Pt has hx of IV drug use. No PMH or PSH on file. Family / Caregiver Present: No  Referring Practitioner: Dr. Altagracia Cornell  Diagnosis: Hyperammonemia  Subjective  Subjective: Pt was sitting at EOB with breakfast tray upon arrival. Pt stated that he is feeling weak.   Patient Currently in Pain: Denies  Vital Signs  Patient Currently in Pain: Denies     Social/Functional History  Social/Functional History  Lives With:  (Friend)  Type of Home: Apartment  Home Layout: One level  Home Access: Stairs to enter with rails  Entrance Stairs - Number of Steps: 17  Entrance Stairs - Rails: Right  Bathroom Shower/Tub: Tub/Shower unit  Bathroom Toilet: Standard  Home Equipment: Cane (Owns cane but does not use)  ADL Assistance: Independent  Homemaking Assistance: Independent  Homemaking Responsibilities: Yes (Cleaning, laundry, cooking, grocery shopping)  Ambulation Assistance: Independent  Transfer Assistance: Independent  Active : No  Patient's  Info: Pt's friend/roommate drives  Occupation: Unemployed  Leisure & Hobbies: Playing pool  Additional Comments: Pt does not recall falls but stated he does not know what happened the past couple of days. Friend works & will not always be there. Objective   Vision: Within Functional Limits  Hearing: Within functional limits      Orientation  Overall Orientation Status: Within Normal Limits     Balance  Sitting Balance: Stand by assistance  Standing Balance: Contact guard assistance (CGA with no device, SBA with walker)  Standing Balance  Time: ~5 min  Activity: Ambulating in room, ambulating in hallway, grooming tasks at sink  Functional Mobility  Functional - Mobility Device: Rolling Walker (No device at start of session, trialed mobility with RW)  Activity: To/from bathroom (Ambulating in hallway)  Assist Level: Contact guard assistance (SBA with RW, CGA with no device)  Toilet Transfers  Toilet - Technique: Ambulating  Equipment Used: Standard toilet (Grab bar)  Toilet Transfer: Contact guard assistance  ADL  Feeding: Independent  Grooming: Contact guard assistance (Pt combed hair while standing at sink with CGA)  LE Dressing: Stand by assistance (Pt doffed and donned sock with SBA while sitting at EOB)  Toileting:  (Denied need)   Note: declined additional ADLs due to fatigue.      Tone RUE  RUE Tone: Normotonic  Tone LUE  LUE Tone: Normotonic     Bed mobility  Supine to Sit: Stand by assistance  Scooting: Stand by assistance  Transfers  Stand Step Transfers: Contact guard assistance  Sit to stand: Stand by assistance  Stand to sit: Stand by assistance     Cognition  Overall Cognitive Status: Exceptions  Safety Judgement: Decreased awareness of need for assistance;Decreased awareness of need for safety  Insights: Decreased awareness of deficits        Sensation  Overall Sensation Status: WFL        LUE AROM (degrees)  LUE AROM : WFL  RUE AROM (degrees)  RUE AROM : WFL  LUE Strength  Gross LUE Strength: WFL  RUE Strength  Gross RUE Strength: WFL     Hand Dominance  Hand Dominance: Right      Pt was seen for OT evaluation and treatment including ADL training, patient education, and functional mobility. Plan   Plan  Times per week: 2-5  Current Treatment Recommendations: Strengthening, Balance Training, ROM, Functional Mobility Training, Endurance Training, Safety Education & Training, Patient/Caregiver Education & Training, Self-Care / ADL    AM-PAC Score        AM-[facility] Inpatient Daily Activity Raw Score: 17 (09/10/21 1040)  AM-PAC Inpatient ADL T-Scale Score : 37.26 (09/10/21 1040)  ADL Inpatient CMS 0-100% Score: 50.11 (09/10/21 1040)  ADL Inpatient CMS G-Code Modifier : CK (09/10/21 1040)    Goals   No goals met  Short term goals  Time Frame for Short term goals: Discharge  Short term goal 1: Pt will stand at sink with SBA x6 min to complete grooming tasks  Short term goal 2: Pt will complete toileting with SBA  Patient Goals   Patient goals : Pt plans to return to apartment w/ roommate       Therapy Time   Individual Concurrent Group Co-treatment   Time In 0916         Time Out 0954         Minutes 38         Timed Code Treatment Minutes: 28 Minutes    Total Treatment Time: [address] AL[address]SS[address]abria  A licensed therapist was present, directed the patient's care, made skilled judgement, and was responsible for assessment and treatment of the patient.

## 2021-09-10 NOTE — DISCHARGE INSTR - COC
Continuity of Care Form    Patient Name: Nory Trejo   :  1970  MRN:  [id number]    Admit date:  2021  Discharge date:  ***    Code Status Order: Full Code   Advance Directives:      Admitting Physician:  Hoda Jaramillo DO  PCP: DEVEN Wells CNP    Discharging Nurse: [facility] Unit/Room#: 0685/1876-06  Discharging Unit Phone Number: ***    Emergency Contact:   Extended Emergency Contact Information  Primary Emergency Contact: [address] [facility] [place] Phone: [phone]  Relation: Other  Secondary Emergency Contact: Nichol Morales  Mobile Phone: [phone]  Relation: Other    Past Surgical History:  History reviewed. No pertinent surgical history. Immunization History: There is no immunization history on file for this patient.     Active Problems:  Patient Active Problem List   Diagnosis Code    Cirrhosis ([facility] [address] K74.60    Pleural effusion J90    Hepatitis C virus infection without hepatic coma B19.20    Hypoxia R09.02    Pneumococcal bacteremia R78.81, B95.3    Pneumonia J18.9    Skin rash R21    IVDU (intravenous drug user) F19.90    Encephalopathy acute G93.40    Hyponatremia E87.1    Altered mental status R41.82    Hyperammonemia ([facility] [address] E72.20    Anasarca R60.1       Isolation/Infection:   Isolation            No Isolation          Patient Infection Status       Infection Onset Added Last Indicated Last Indicated By Review Planned Expiration Resolved Resolved By    None active    Resolved    COVID-19 Rule Out 21 COVID-19 (Ordered)   21 King Ashraf, RN    Order discontinued by provider    COVID-19 Rule Out 21 COVID-19 (Ordered)   21 Rule-Out Test Resulted    C-diff Rule Out 21 Clostridium difficile toxin/antigen (Ordered)   21 King Ashraf, RN    Order discontinued            Nurse Assessment:  Last Vital Signs: BP (!) 120/58   Pulse 81   Temp 97.6 °F (36.4 °C) (Oral)   Resp 14   SpO2 91%     Last documented pain score (0-10 scale): Pain Level: 0  Last Weight:   Wt Readings from Last 1 Encounters:   21 209 lb (94.8 kg)     Mental Status:  {IP PT MENTAL STATUS::::0}    IV Access:  { BLAKE IV ACCESS:709412154:::0}    Nursing Mobility/ADLs:  Walking   {CHP DME ADLs:003227259:::0}  Transfer  {CHP DME ADLs:331142668:::0}  Bathing  {CHP DME ADLs:299804180:::0}  Dressing  {CHP DME ADLs:228456222:::0}  Toileting  {CHP DME ADLs:191102957:::0}  Feeding  {CHP DME ADLs:636318744:::0}  Med Admin  {CHP DME ADLs:643663761:::0}  Med Delivery   { BLAKE MED Delivery:021331630:::0}    Wound Care Documentation and Therapy:        Elimination:  Continence:   · Bowel: {YES / XP:29802}  · Bladder: {YES / ML:41681}  Urinary Catheter: {Urinary Catheter:066407289:::0}   Colostomy/Ileostomy/Ileal Conduit: {YES / VE:43979}       Date of Last BM: ***    Intake/Output Summary (Last 24 hours) at 9/10/2021 1117  Last data filed at 9/10/2021 1057  Gross per 24 hour   Intake 950 ml   Output 1475 ml   Net -525 ml     I/O last 3 completed shifts:   In: 700 [P.O.:700]  Out: 775 [Urine:775]    Safety Concerns:     [address] Safety Concerns:229902318:::0}    Impairments/Disabilities:      [address] Impairments/Disabilities:995501814:::0}    Nutrition Therapy:  Current Nutrition Therapy:   [address] Diet List:615421126:::0}    Routes of Feeding: {CHP DME Other Feedings:132547684:::0}  Liquids: {Slp liquid thickness:26323}  Daily Fluid Restriction: {CHP DME Yes amt example:641074256:::0}  Last Modified Barium Swallow with Video (Video Swallowing Test): {Done Not Done YNLK:422330770:::5}    Treatments at the Time of Hospital Discharge:   Respiratory Treatments: ***  Oxygen Therapy:  {Therapy; copd oxygen:73741:::0}  Ventilator:    {[facility] Vent List:439808539:::0}    Rehab Therapies: {THERAPEUTIC INTERVENTION:8342948411}  Weight Bearing Status/Restrictions: {[facility] Weight Bearin:::0}  Other Medical Equipment (for information only, NOT a DME order):  {EQUIPMENT:432775961}  Other Treatments: ***    Patient's personal belongings (please select all that are sent with patient):  {CHP DME Belongings:066614283:::0}    RN SIGNATURE:  {Esignature:278309828:::0}    CASE MANAGEMENT/SOCIAL WORK SECTION    Inpatient Status Date: ***    Readmission Risk Assessment Score:  Readmission Risk              Risk of Unplanned Readmission:  22           Discharging to Facility/ Agency   [facility] [address] [facility] [address] [facility] [address]       Phone: [phone]       Fax: [phone]        ·     / signature: Electronically signed by LORETTA Vaughn, YVESW on 9/10/21 at 1:46 PM EDT    PHYSICIAN SECTION    Prognosis: Fair    Condition at Discharge: Stable    Rehab Potential (if transferring to Rehab): N/A    Recommended Labs or Other Treatments After Discharge: N/A    Physician Certification: I certify the above information and transfer of Keshia Pierson  is necessary for the continuing treatment of the diagnosis listed and that he requires Home Care for less 30 days.      Update Admission H&P: No change in H&P    PHYSICIAN SIGNATURE:  Electronically signed by Dale Doran MD on 9/10/21 at 11:18 AM EDT

## 2021-09-10 NOTE — PROGRESS NOTES
anterior neck. Pt demonstrated adequate mastication with solid texture. Recommend regular diet/thin liquids     MBS results - not indicated at this time    Pain: denies    Current Diet : regular/thin liquids    Treatment:  Pt seen bedside to address the following goals:  1-The patient will tolerate recommended diet without observed clinical signs of aspiration  9/10: pt sitting on EOB eating breakfast, medical resident present who states concern for evaluation initially was pt mental status. Medical resident states pt still with some wheezing and crackles in lungs but they are improving. Pt consuming sausage, pancakes and liquids. Pt demonstrated adequate mastication despite some missing dentition, no oral residue noted. Pt drank 3 ounces of uninterrupted swallows of thin liquids x 2 with no overt signs of aspiration. Pt denies any issues with swallowing  Goal met  2- The patient Evelia Estrella will verbalize/demonstrate understanding of dysphagia recommendations  9/9: Educated pt to purpose of visit, s/s of aspiration, concern if aspiration occurs, rationale for diet recommendation/strategies to reduce risk for aspiration and instruction to notify staff if any signs emerge. Pt stated comprehension   Cont goal  9/10: Educated pt again to purpose of visit, s/s of aspiration, concern if aspiration occurs,  and instruction to notify staff if any signs emerge. Pt stated comprehension  Goal met    Patient/Family/Caregiver Education:  As above    Compensatory Strategies:  90 degrees all meals  Rest breaks if SOB     Plan:    Diet recommendations: cont regular diet/thin liquids  DC recommendation: no follow up at this time  Treatment: 15  D/W nursing Taj  Needs met prior to leaving room, call button in reach. Ariel Murcia M.S./CCC-SLP #[id number]  Pg.  # [phone]

## 2021-09-12 LAB
BLOOD CULTURE, ROUTINE: NORMAL
BODY FLUID CULTURE, STERILE: NORMAL
CULTURE, BLOOD 2: NORMAL
FOLATE: 9.36 NG/ML (ref 4.78–24.2)
GRAM STAIN RESULT: NORMAL
VITAMIN B-12: >2000 PG/ML (ref 211–911)

## 2022-09-14 NOTE — PLAN OF CARE
Problem: Falls - Risk of:  Goal: Will remain free from falls  Description: Will remain free from falls  Outcome: Ongoing  Note: Pt is a Fall Risk. See Maegan Childress Fall Risk Score. Bedrest orders at this time. Pt bed in low position and side rails up. Call light and belongings in reach. Pt encouraged to call for assistance. Will continue with hourly rounds for PO intake, pain needs, toileting, and repositioning as needed. Problem: Skin Integrity:  Goal: Absence of new skin breakdown  Description: Absence of new skin breakdown  Outcome: Ongoing  Note: No new skin breakdown noted. Pt turns self in bed, using urinal appropriately. Will continue to monitor. Problem: Cardiac:  Goal: Hemodynamic stability will improve  Description: Hemodynamic stability will improve  Outcome: Ongoing  Note: VSS at this time. Pt received FFP tonight- vital signs stable duing and following administration. Crackles noted bilaterally with auscultation. Continuous SpO2. SpO2 in upper 90s on 12L HFNC. Will begin to wean as appropriate.  Pt encouraged to notify RN f experiencing shortness of breath or chest pain Detail Level: Detailed Quality 431: Preventive Care And Screening: Unhealthy Alcohol Use - Screening: Patient did not receive brief counseling if identified as an unhealthy alcohol user, reason not given Quality 226: Preventive Care And Screening: Tobacco Use: Screening And Cessation Intervention: Patient screened for tobacco use and is an ex/non-smoker

## 2023-02-28 NOTE — PROGRESS NOTES
(V5) oriented Patient transferred off floor. Report given to [address] [place] [address]. Patient denies any further needs.  Merrick Severs, RN